# Patient Record
Sex: FEMALE | Race: BLACK OR AFRICAN AMERICAN | URBAN - METROPOLITAN AREA
[De-identification: names, ages, dates, MRNs, and addresses within clinical notes are randomized per-mention and may not be internally consistent; named-entity substitution may affect disease eponyms.]

---

## 2021-07-08 ENCOUNTER — APPOINTMENT (RX ONLY)
Dept: URBAN - METROPOLITAN AREA CLINIC 45 | Facility: CLINIC | Age: 21
Setting detail: DERMATOLOGY
End: 2021-07-08

## 2021-07-08 DIAGNOSIS — L70.8 OTHER ACNE: ICD-10-CM | Status: INADEQUATELY CONTROLLED

## 2021-07-08 PROCEDURE — ? ADDITIONAL NOTES

## 2021-07-08 PROCEDURE — ? COUNSELING

## 2021-07-08 PROCEDURE — ? PRESCRIPTION

## 2021-07-08 PROCEDURE — 99204 OFFICE O/P NEW MOD 45 MIN: CPT

## 2021-07-08 PROCEDURE — ? PRESCRIPTION MEDICATION MANAGEMENT

## 2021-07-08 RX ORDER — TRETIONIN 0.5 MG/G
CREAM TOPICAL QHS
Qty: 1 | Refills: 5 | Status: ERX | COMMUNITY
Start: 2021-07-08

## 2021-07-08 RX ORDER — AZELAIC ACID 0.15 G/G
GEL TOPICAL QAM
Qty: 1 | Refills: 3 | Status: ERX | COMMUNITY
Start: 2021-07-08

## 2021-07-08 RX ADMIN — AZELAIC ACID: 0.15 GEL TOPICAL at 00:00

## 2021-07-08 RX ADMIN — TRETIONIN: 0.5 CREAM TOPICAL at 00:00

## 2021-07-08 ASSESSMENT — LOCATION DETAILED DESCRIPTION DERM
LOCATION DETAILED: LEFT CHIN
LOCATION DETAILED: RIGHT INFERIOR MEDIAL FOREHEAD
LOCATION DETAILED: RIGHT CENTRAL MALAR CHEEK
LOCATION DETAILED: LEFT CENTRAL MALAR CHEEK

## 2021-07-08 ASSESSMENT — LOCATION SIMPLE DESCRIPTION DERM
LOCATION SIMPLE: RIGHT CHEEK
LOCATION SIMPLE: LEFT CHEEK
LOCATION SIMPLE: RIGHT FOREHEAD
LOCATION SIMPLE: CHIN

## 2021-07-08 ASSESSMENT — LOCATION ZONE DERM: LOCATION ZONE: FACE

## 2021-07-08 NOTE — PROCEDURE: PRESCRIPTION MEDICATION MANAGEMENT
Render In Strict Bullet Format?: No
Initiate Treatment: Patient to wash face every morning with Panoxyl 10% wash, then will apply Clindamycin 1% swabs to face every morning, then apply moisturizer with sunscreen, at night patient to wash face with Cetaphil/CeraVe gentle cleanser then rinse off, pat face dry, then apply a pea-size amount of the Tretinoin 0.05% cream to face let dry and apply a moisturizer.
Detail Level: Zone
Plan: Morning -\\nPanoxyl wash in morning for face\\nazelaic acid gel \\nSpf moisturizer\\n\\An night-\\nCetaphil gentle cleanser \\nTretinoin cream\\nCetaphil Moisturizer or hyaluronic serum \\n\\nIf things are better in 3 months when patient comes back to see us , we will stay on topicals\\nif not we will start Spironolactone

## 2021-07-08 NOTE — HPI: PIMPLES (ACNE)
What Type Of Note Output Would You Prefer (Optional)?: Bullet Format
How Severe Is Your Acne?: moderate
Is This A New Presentation, Or A Follow-Up?: Acne
Additional Comments (Use Complete Sentences): NEW PT.

## 2021-07-08 NOTE — PROCEDURE: COUNSELING
Tazorac Counseling:  Patient advised that medication is irritating and drying.  Patient may need to apply sparingly and wash off after an hour before eventually leaving it on overnight.  The patient verbalized understanding of the proper use and possible adverse effects of tazorac.  All of the patient's questions and concerns were addressed.
Azithromycin Pregnancy And Lactation Text: This medication is considered safe during pregnancy and is also secreted in breast milk.
Azithromycin Counseling:  I discussed with the patient the risks of azithromycin including but not limited to GI upset, allergic reaction, drug rash, diarrhea, and yeast infections.
Detail Level: Detailed
Isotretinoin Counseling: Patient should get monthly blood tests, not donate blood, not drive at night if vision affected, not share medication, and not undergo elective surgery for 6 months after tx completed. Side effects reviewed, pt to contact office should one occur.
Include Pregnancy/Lactation Warning?: No
Erythromycin Counseling:  I discussed with the patient the risks of erythromycin including but not limited to GI upset, allergic reaction, drug rash, diarrhea, increase in liver enzymes, and yeast infections.
Topical Sulfur Applications Pregnancy And Lactation Text: This medication is Pregnancy Category C and has an unknown safety profile during pregnancy. It is unknown if this topical medication is excreted in breast milk.
High Dose Vitamin A Pregnancy And Lactation Text: High dose vitamin A therapy is contraindicated during pregnancy and breast feeding.
High Dose Vitamin A Counseling: Side effects reviewed, pt to contact office should one occur.
Benzoyl Peroxide Pregnancy And Lactation Text: This medication is Pregnancy Category C. It is unknown if benzoyl peroxide is excreted in breast milk.
Topical Retinoid counseling:  Patient advised to apply a pea-sized amount only at bedtime and wait 30 minutes after washing their face before applying.  If too drying, patient may add a non-comedogenic moisturizer. The patient verbalized understanding of the proper use and possible adverse effects of retinoids.  All of the patient's questions and concerns were addressed.
Bactrim Pregnancy And Lactation Text: This medication is Pregnancy Category D and is known to cause fetal risk.  It is also excreted in breast milk.
Dapsone Counseling: I discussed with the patient the risks of dapsone including but not limited to hemolytic anemia, agranulocytosis, rashes, methemoglobinemia, kidney failure, peripheral neuropathy, headaches, GI upset, and liver toxicity.  Patients who start dapsone require monitoring including baseline LFTs and weekly CBCs for the first month, then every month thereafter.  The patient verbalized understanding of the proper use and possible adverse effects of dapsone.  All of the patient's questions and concerns were addressed.
Spironolactone Pregnancy And Lactation Text: This medication can cause feminization of the male fetus and should be avoided during pregnancy. The active metabolite is also found in breast milk.
Tazorac Pregnancy And Lactation Text: This medication is not safe during pregnancy. It is unknown if this medication is excreted in breast milk.
Benzoyl Peroxide Counseling: Patient counseled that medicine may cause skin irritation and bleach clothing.  In the event of skin irritation, the patient was advised to reduce the amount of the drug applied or use it less frequently.   The patient verbalized understanding of the proper use and possible adverse effects of benzoyl peroxide.  All of the patient's questions and concerns were addressed.
Doxycycline Pregnancy And Lactation Text: This medication is Pregnancy Category D and not consider safe during pregnancy. It is also excreted in breast milk but is considered safe for shorter treatment courses.
Dapsone Pregnancy And Lactation Text: This medication is Pregnancy Category C and is not considered safe during pregnancy or breast feeding.
Minocycline Pregnancy And Lactation Text: This medication is Pregnancy Category D and not consider safe during pregnancy. It is also excreted in breast milk.
Isotretinoin Pregnancy And Lactation Text: This medication is Pregnancy Category X and is considered extremely dangerous during pregnancy. It is unknown if it is excreted in breast milk.
Topical Sulfur Applications Counseling: Topical Sulfur Counseling: Patient counseled that this medication may cause skin irritation or allergic reactions.  In the event of skin irritation, the patient was advised to reduce the amount of the drug applied or use it less frequently.   The patient verbalized understanding of the proper use and possible adverse effects of topical sulfur application.  All of the patient's questions and concerns were addressed.
Erythromycin Pregnancy And Lactation Text: This medication is Pregnancy Category B and is considered safe during pregnancy. It is also excreted in breast milk.
Birth Control Pills Pregnancy And Lactation Text: This medication should be avoided if pregnant and for the first 30 days post-partum.
Tetracycline Counseling: Patient counseled regarding possible photosensitivity and increased risk for sunburn.  Patient instructed to avoid sunlight, if possible.  When exposed to sunlight, patients should wear protective clothing, sunglasses, and sunscreen.  The patient was instructed to call the office immediately if the following severe adverse effects occur:  hearing changes, easy bruising/bleeding, severe headache, or vision changes.  The patient verbalized understanding of the proper use and possible adverse effects of tetracycline.  All of the patient's questions and concerns were addressed. Patient understands to avoid pregnancy while on therapy due to potential birth defects.
Bactrim Counseling:  I discussed with the patient the risks of sulfa antibiotics including but not limited to GI upset, allergic reaction, drug rash, diarrhea, dizziness, photosensitivity, and yeast infections.  Rarely, more serious reactions can occur including but not limited to aplastic anemia, agranulocytosis, methemoglobinemia, blood dyscrasias, liver or kidney failure, lung infiltrates or desquamative/blistering drug rashes.
Topical Retinoid Pregnancy And Lactation Text: This medication is Pregnancy Category C. It is unknown if this medication is excreted in breast milk.
Topical Clindamycin Pregnancy And Lactation Text: This medication is Pregnancy Category B and is considered safe during pregnancy. It is unknown if it is excreted in breast milk.
Birth Control Pills Counseling: Birth Control Pill Counseling: I discussed with the patient the potential side effects of OCPs including but not limited to increased risk of stroke, heart attack, thrombophlebitis, deep venous thrombosis, hepatic adenomas, breast changes, GI upset, headaches, and depression.  The patient verbalized understanding of the proper use and possible adverse effects of OCPs. All of the patient's questions and concerns were addressed.
Doxycycline Counseling:  Patient counseled regarding possible photosensitivity and increased risk for sunburn.  Patient instructed to avoid sunlight, if possible.  When exposed to sunlight, patients should wear protective clothing, sunglasses, and sunscreen.  The patient was instructed to call the office immediately if the following severe adverse effects occur:  hearing changes, easy bruising/bleeding, severe headache, or vision changes.  The patient verbalized understanding of the proper use and possible adverse effects of doxycycline.  All of the patient's questions and concerns were addressed.
Spironolactone Counseling: Patient advised regarding risks of diarrhea, abdominal pain, hyperkalemia, birth defects (for female patients), liver toxicity and renal toxicity. The patient may need blood work to monitor liver and kidney function and potassium levels while on therapy. The patient verbalized understanding of the proper use and possible adverse effects of spironolactone.  All of the patient's questions and concerns were addressed.
Minocycline Counseling: Patient advised regarding possible photosensitivity and discoloration of the teeth, skin, lips, tongue and gums.  Patient instructed to avoid sunlight, if possible.  When exposed to sunlight, patients should wear protective clothing, sunglasses, and sunscreen.  The patient was instructed to call the office immediately if the following severe adverse effects occur:  hearing changes, easy bruising/bleeding, severe headache, or vision changes.  The patient verbalized understanding of the proper use and possible adverse effects of minocycline.  All of the patient's questions and concerns were addressed.
Sarecycline Counseling: Patient advised regarding possible photosensitivity and discoloration of the teeth, skin, lips, tongue and gums.  Patient instructed to avoid sunlight, if possible.  When exposed to sunlight, patients should wear protective clothing, sunglasses, and sunscreen.  The patient was instructed to call the office immediately if the following severe adverse effects occur:  hearing changes, easy bruising/bleeding, severe headache, or vision changes.  The patient verbalized understanding of the proper use and possible adverse effects of sarecycline.  All of the patient's questions and concerns were addressed.
Topical Clindamycin Counseling: Patient counseled that this medication may cause skin irritation or allergic reactions.  In the event of skin irritation, the patient was advised to reduce the amount of the drug applied or use it less frequently.   The patient verbalized understanding of the proper use and possible adverse effects of clindamycin.  All of the patient's questions and concerns were addressed.

## 2021-09-02 PROBLEM — S80.01XA CONTUSION OF RIGHT KNEE: Status: ACTIVE | Noted: 2018-07-26

## 2021-09-02 PROBLEM — R22.9 SKIN MASS: Status: ACTIVE | Noted: 2017-08-01

## 2021-09-02 PROBLEM — M25.462 PAIN AND SWELLING OF LEFT KNEE: Status: ACTIVE | Noted: 2019-12-17

## 2021-09-02 PROBLEM — M25.562 PAIN AND SWELLING OF LEFT KNEE: Status: ACTIVE | Noted: 2019-12-17

## 2021-12-08 PROBLEM — M23.92 INTERNAL DERANGEMENT OF LEFT KNEE: Status: ACTIVE | Noted: 2021-12-08

## 2021-12-08 PROBLEM — Z98.890 HISTORY OF REPAIR OF ACL: Status: ACTIVE | Noted: 2021-12-08

## 2021-12-17 ENCOUNTER — ANESTHESIA EVENT (OUTPATIENT)
Dept: SURGERY | Age: 21
End: 2021-12-17
Payer: COMMERCIAL

## 2021-12-20 ENCOUNTER — ANESTHESIA (OUTPATIENT)
Dept: SURGERY | Age: 21
End: 2021-12-20
Payer: COMMERCIAL

## 2021-12-20 ENCOUNTER — HOSPITAL ENCOUNTER (OUTPATIENT)
Age: 21
Setting detail: OUTPATIENT SURGERY
Discharge: HOME | End: 2021-12-20
Attending: ORTHOPAEDIC SURGERY | Admitting: ORTHOPAEDIC SURGERY
Payer: COMMERCIAL

## 2021-12-20 VITALS
WEIGHT: 155 LBS | RESPIRATION RATE: 14 BRPM | TEMPERATURE: 97.7 F | DIASTOLIC BLOOD PRESSURE: 76 MMHG | HEART RATE: 46 BPM | SYSTOLIC BLOOD PRESSURE: 114 MMHG | OXYGEN SATURATION: 100 % | HEIGHT: 70 IN | BODY MASS INDEX: 22.19 KG/M2

## 2021-12-20 DIAGNOSIS — Z09 SURGERY FOLLOW-UP: ICD-10-CM

## 2021-12-20 DIAGNOSIS — M23.92 INTERNAL DERANGEMENT OF LEFT KNEE: ICD-10-CM

## 2021-12-20 PROBLEM — M24.662 ARTHROFIBROSIS OF KNEE JOINT, LEFT: Status: ACTIVE | Noted: 2021-12-20

## 2021-12-20 LAB — HCG UR QL: NEGATIVE

## 2021-12-20 PROCEDURE — 77030040922 HC BLNKT HYPOTHRM STRY -A: Performed by: ANESTHESIOLOGY

## 2021-12-20 PROCEDURE — 77030019605: Performed by: ORTHOPAEDIC SURGERY

## 2021-12-20 PROCEDURE — 2709999900 HC NON-CHARGEABLE SUPPLY: Performed by: ORTHOPAEDIC SURGERY

## 2021-12-20 PROCEDURE — 74011250636 HC RX REV CODE- 250/636: Performed by: PHYSICIAN ASSISTANT

## 2021-12-20 PROCEDURE — 77030038066 HC BLD SHVR ARTH -B: Performed by: ORTHOPAEDIC SURGERY

## 2021-12-20 PROCEDURE — 74011250636 HC RX REV CODE- 250/636: Performed by: NURSE ANESTHETIST, CERTIFIED REGISTERED

## 2021-12-20 PROCEDURE — 76210000020 HC REC RM PH II FIRST 0.5 HR: Performed by: ORTHOPAEDIC SURGERY

## 2021-12-20 PROCEDURE — 77030002933 HC SUT MCRYL J&J -A: Performed by: ORTHOPAEDIC SURGERY

## 2021-12-20 PROCEDURE — 81025 URINE PREGNANCY TEST: CPT

## 2021-12-20 PROCEDURE — 76060000032 HC ANESTHESIA 0.5 TO 1 HR: Performed by: ORTHOPAEDIC SURGERY

## 2021-12-20 PROCEDURE — 77030012894: Performed by: ORTHOPAEDIC SURGERY

## 2021-12-20 PROCEDURE — 76210000006 HC OR PH I REC 0.5 TO 1 HR: Performed by: ORTHOPAEDIC SURGERY

## 2021-12-20 PROCEDURE — 77030010509 HC AIRWY LMA MSK TELE -A: Performed by: NURSE ANESTHETIST, CERTIFIED REGISTERED

## 2021-12-20 PROCEDURE — 74011250636 HC RX REV CODE- 250/636: Performed by: ANESTHESIOLOGY

## 2021-12-20 PROCEDURE — 77030040936 HC WND COBLATN S&N -C: Performed by: ORTHOPAEDIC SURGERY

## 2021-12-20 PROCEDURE — 29884 ARTHRS KNEE SURG LYSIS ADS: CPT | Performed by: ORTHOPAEDIC SURGERY

## 2021-12-20 PROCEDURE — 74011250636 HC RX REV CODE- 250/636: Performed by: ORTHOPAEDIC SURGERY

## 2021-12-20 PROCEDURE — 74011000250 HC RX REV CODE- 250: Performed by: NURSE ANESTHETIST, CERTIFIED REGISTERED

## 2021-12-20 PROCEDURE — 77030003666 HC NDL SPINAL BD -A: Performed by: ORTHOPAEDIC SURGERY

## 2021-12-20 PROCEDURE — 74011250637 HC RX REV CODE- 250/637: Performed by: ANESTHESIOLOGY

## 2021-12-20 PROCEDURE — 76010000138 HC OR TIME 0.5 TO 1 HR: Performed by: ORTHOPAEDIC SURGERY

## 2021-12-20 RX ORDER — SODIUM CHLORIDE 0.9 % (FLUSH) 0.9 %
5-40 SYRINGE (ML) INJECTION EVERY 8 HOURS
Status: DISCONTINUED | OUTPATIENT
Start: 2021-12-20 | End: 2021-12-20 | Stop reason: HOSPADM

## 2021-12-20 RX ORDER — LIDOCAINE HYDROCHLORIDE 10 MG/ML
0.1 INJECTION INFILTRATION; PERINEURAL AS NEEDED
Status: DISCONTINUED | OUTPATIENT
Start: 2021-12-20 | End: 2021-12-20 | Stop reason: HOSPADM

## 2021-12-20 RX ORDER — FAMOTIDINE 20 MG/1
20 TABLET, FILM COATED ORAL ONCE
Status: COMPLETED | OUTPATIENT
Start: 2021-12-20 | End: 2021-12-20

## 2021-12-20 RX ORDER — DEXAMETHASONE SODIUM PHOSPHATE 100 MG/10ML
INJECTION INTRAMUSCULAR; INTRAVENOUS AS NEEDED
Status: DISCONTINUED | OUTPATIENT
Start: 2021-12-20 | End: 2021-12-20 | Stop reason: HOSPADM

## 2021-12-20 RX ORDER — SODIUM CHLORIDE 0.9 % (FLUSH) 0.9 %
5-40 SYRINGE (ML) INJECTION AS NEEDED
Status: DISCONTINUED | OUTPATIENT
Start: 2021-12-20 | End: 2021-12-20 | Stop reason: HOSPADM

## 2021-12-20 RX ORDER — ONDANSETRON 2 MG/ML
INJECTION INTRAMUSCULAR; INTRAVENOUS AS NEEDED
Status: DISCONTINUED | OUTPATIENT
Start: 2021-12-20 | End: 2021-12-20 | Stop reason: HOSPADM

## 2021-12-20 RX ORDER — ROPIVACAINE HYDROCHLORIDE 5 MG/ML
INJECTION, SOLUTION EPIDURAL; INFILTRATION; PERINEURAL AS NEEDED
Status: DISCONTINUED | OUTPATIENT
Start: 2021-12-20 | End: 2021-12-20 | Stop reason: HOSPADM

## 2021-12-20 RX ORDER — SODIUM CHLORIDE, SODIUM LACTATE, POTASSIUM CHLORIDE, CALCIUM CHLORIDE 600; 310; 30; 20 MG/100ML; MG/100ML; MG/100ML; MG/100ML
150 INJECTION, SOLUTION INTRAVENOUS CONTINUOUS
Status: DISCONTINUED | OUTPATIENT
Start: 2021-12-20 | End: 2021-12-20 | Stop reason: HOSPADM

## 2021-12-20 RX ORDER — KETOROLAC TROMETHAMINE 30 MG/ML
INJECTION, SOLUTION INTRAMUSCULAR; INTRAVENOUS AS NEEDED
Status: DISCONTINUED | OUTPATIENT
Start: 2021-12-20 | End: 2021-12-20 | Stop reason: HOSPADM

## 2021-12-20 RX ORDER — FENTANYL CITRATE 50 UG/ML
100 INJECTION, SOLUTION INTRAMUSCULAR; INTRAVENOUS ONCE
Status: COMPLETED | OUTPATIENT
Start: 2021-12-20 | End: 2021-12-20

## 2021-12-20 RX ORDER — PROPOFOL 10 MG/ML
INJECTION, EMULSION INTRAVENOUS AS NEEDED
Status: DISCONTINUED | OUTPATIENT
Start: 2021-12-20 | End: 2021-12-20 | Stop reason: HOSPADM

## 2021-12-20 RX ORDER — MIDAZOLAM HYDROCHLORIDE 1 MG/ML
2 INJECTION, SOLUTION INTRAMUSCULAR; INTRAVENOUS
Status: DISCONTINUED | OUTPATIENT
Start: 2021-12-20 | End: 2021-12-20 | Stop reason: HOSPADM

## 2021-12-20 RX ORDER — HYDROMORPHONE HYDROCHLORIDE 2 MG/ML
0.5 INJECTION, SOLUTION INTRAMUSCULAR; INTRAVENOUS; SUBCUTANEOUS
Status: DISCONTINUED | OUTPATIENT
Start: 2021-12-20 | End: 2021-12-20 | Stop reason: HOSPADM

## 2021-12-20 RX ORDER — ACETAMINOPHEN 500 MG
1000 TABLET ORAL ONCE
Status: COMPLETED | OUTPATIENT
Start: 2021-12-20 | End: 2021-12-20

## 2021-12-20 RX ORDER — LIDOCAINE HYDROCHLORIDE 20 MG/ML
INJECTION, SOLUTION EPIDURAL; INFILTRATION; INTRACAUDAL; PERINEURAL AS NEEDED
Status: DISCONTINUED | OUTPATIENT
Start: 2021-12-20 | End: 2021-12-20 | Stop reason: HOSPADM

## 2021-12-20 RX ORDER — HYDROCODONE BITARTRATE AND ACETAMINOPHEN 5; 325 MG/1; MG/1
1 TABLET ORAL AS NEEDED
Status: DISCONTINUED | OUTPATIENT
Start: 2021-12-20 | End: 2021-12-20 | Stop reason: HOSPADM

## 2021-12-20 RX ORDER — ACETAMINOPHEN 500 MG
1000 TABLET ORAL
Status: DISCONTINUED | OUTPATIENT
Start: 2021-12-20 | End: 2021-12-20 | Stop reason: HOSPADM

## 2021-12-20 RX ORDER — SODIUM CHLORIDE 9 MG/ML
50 INJECTION, SOLUTION INTRAVENOUS CONTINUOUS
Status: DISCONTINUED | OUTPATIENT
Start: 2021-12-20 | End: 2021-12-20 | Stop reason: HOSPADM

## 2021-12-20 RX ORDER — CEFAZOLIN SODIUM/WATER 2 G/20 ML
2 SYRINGE (ML) INTRAVENOUS ONCE
Status: COMPLETED | OUTPATIENT
Start: 2021-12-20 | End: 2021-12-20

## 2021-12-20 RX ADMIN — FENTANYL CITRATE 25 MCG: 50 INJECTION INTRAMUSCULAR; INTRAVENOUS at 08:20

## 2021-12-20 RX ADMIN — FENTANYL CITRATE 25 MCG: 50 INJECTION INTRAMUSCULAR; INTRAVENOUS at 08:21

## 2021-12-20 RX ADMIN — LIDOCAINE HYDROCHLORIDE 100 MG: 20 INJECTION, SOLUTION EPIDURAL; INFILTRATION; INTRACAUDAL; PERINEURAL at 08:14

## 2021-12-20 RX ADMIN — PROPOFOL 200 MG: 10 INJECTION, EMULSION INTRAVENOUS at 08:14

## 2021-12-20 RX ADMIN — Medication 2 G: at 08:20

## 2021-12-20 RX ADMIN — ONDANSETRON 4 MG: 2 INJECTION INTRAMUSCULAR; INTRAVENOUS at 08:22

## 2021-12-20 RX ADMIN — SODIUM CHLORIDE, SODIUM LACTATE, POTASSIUM CHLORIDE, AND CALCIUM CHLORIDE 150 ML/HR: 600; 310; 30; 20 INJECTION, SOLUTION INTRAVENOUS at 06:42

## 2021-12-20 RX ADMIN — KETOROLAC TROMETHAMINE 30 MG: 30 INJECTION, SOLUTION INTRAMUSCULAR at 09:01

## 2021-12-20 RX ADMIN — ACETAMINOPHEN 1000 MG: 500 TABLET ORAL at 06:36

## 2021-12-20 RX ADMIN — FENTANYL CITRATE 25 MCG: 50 INJECTION INTRAMUSCULAR; INTRAVENOUS at 08:23

## 2021-12-20 RX ADMIN — FENTANYL CITRATE 25 MCG: 50 INJECTION INTRAMUSCULAR; INTRAVENOUS at 08:18

## 2021-12-20 RX ADMIN — FAMOTIDINE 20 MG: 20 TABLET ORAL at 06:36

## 2021-12-20 RX ADMIN — DEXAMETHASONE SODIUM PHOSPHATE 10 MG: 10 INJECTION INTRAMUSCULAR; INTRAVENOUS at 08:22

## 2021-12-20 NOTE — DISCHARGE INSTRUCTIONS
Postoperative Instructions - Knee Arthroscopy    Please note these are general instructions for postoperative care. Specific instructions may be given regarding the type of surgery that you have undergone. 1. Postoperative dressings may be removed after 2 to 3 days. Dressings should be kept dry for the first several days. Following removal of the dressing, wounds should be kept dry when showering. A large trash bag taped to the thigh can work well. Do not soak wounds in the tub prior to suture removal.      2. All steri-strip tapes across the wound should be left in place if your incisions have them. 3. Some discomfort is expected following any operation. You will be given a prescription for pain medication along with instructions for its use. Many pain medications contain Tylenol. Do not take additional Tylenol if you are currently taking the prescribed pain medications. Do not drive while taking pain medications. Do not make important personal or business decisions or sign legal documents the first 24 hours after surgery. If your pain is not adequately controlled, contact your surgeon on call at the numbers on this sheet. 4. Following simple knee arthroscopy crutches are not usually utilized or are used for only 1 or 2 days. Working on regaining full motion of the knee is encouraged. Bending and straightening the knee and performing ankle range of motion is encouraged to prevent blood clots. Try to use a stationary bike without resistance within the first week of surgery to help regain motion. 5. Elevating the lower extremity after surgery is helpful in the first few days postoperatively. This can help control swelling. Applying ice for 15 to 20 minutes per hour to the surgical site is often helpful in decreasing pain and swelling.     6. As pain subsides, discontinuation of narcotic medication is recommended and switching to Tylenol or over the counter anti-inflammatory medication is [Takes medication as prescribed] : takes desirable. 7. Pain medications can cause constipation, nausea and vomiting, and sometimes itching and hives. Keeping hydrated by taking liquids on a regular basis can help. For constipation, consider over the counter additions like Metamucil or an over-the-counter stool softner. Trying to limit narcotic use can often help with regards to nausea and vomiting. Taking pain medication with a small amount of food is also usually helpful. Itching and hives can occur with pain medication as well as antibiotics that are given during the priyanka-operative time. Over the counter Benadryl (25mg every 6 hours) can be helpful in treating itching. 8. If you feel you are progressing slowly, feel free to call our office to get you into physical therapy if you do not already have a prescription. 9. Any fevers (101º or greater) after 2-3 days post-op, increasing redness, drainage, or steadily increasing pain please notify us or come in as soon as possible. 10. Deep vein thrombosis (DVT) is a condition in which a blood clot has formed in a deep vein of the leg. This may result in redness, swelling, warmth and/or pain of the leg. Although these are very rare, there are things that can be done to lessen the risk. - It is recommended by your surgeon unless indicated otherwise, after knee arthroscopy; take an adult aspirin once a day for three weeks after surgery to help prevent the formation of blood clots. (Do not take aspirin against the advice of your medical doctor). -   For several days (at least until you are walking about most of the day), rotate your  ankle, bend your toes and move your foot back and forth and from side to side, frequently (at least a few minutes every hour while you are awake) in order to keep blood circulation flowing so that the blood clotting will be less likely to occur. The more circulation, the less chance of blood clotting and a DVT.     If you call the office [None] : Patient does not have any barriers to medication adherence please have us paged instead of leaving a voice mail so that we can immediately take care of your needs. Phone [phone]              Fax [phone]                         MEDICATION INTERACTION:  During your procedure you potentially received a medication or medications which may reduce the effectiveness of oral contraceptives. Please consider other forms of contraception for 1 month following your procedure if you are currently using oral contraceptives as your primary form of birth control. In addition to this, we recommend continuing your oral contraceptive as prescribed, unless otherwise instructed by your physician, during this time    After general anesthesia or intravenous sedation, for 24 hours or while taking prescription Narcotics:  · Limit your activities  · A responsible adult needs to be with you for the next 24 hours  · Do not drive and operate hazardous machinery  · Do not make important personal or business decisions  · Do not drink alcoholic beverages  · If you have not urinated within 8 hours after discharge, and you are experiencing discomfort from urinary retention, please go to the nearest ED. · If you have sleep apnea and have a CPAP machine, please use it for all naps and sleeping. · Please use caution when taking narcotics and any of your home medications that may cause drowsiness. *  Please give a list of your current medications to your Primary Care Provider. *  Please update this list whenever your medications are discontinued, doses are      changed, or new medications (including over-the-counter products) are added. *  Please carry medication information at all times in case of emergency situations. These are general instructions for a healthy lifestyle:  No smoking/ No tobacco products/ Avoid exposure to second hand smoke  Surgeon General's Warning:  Quitting smoking now greatly reduces serious risk to your health.   Obesity, smoking, and sedentary lifestyle greatly increases your risk for illness  A healthy diet, regular physical exercise & weight monitoring are important for maintaining a healthy lifestyle    You may be retaining fluid if you have a history of heart failure or if you experience any of the following symptoms:  Weight gain of 3 pounds or more overnight or 5 pounds in a week, increased swelling in our hands or feet or shortness of breath while lying flat in bed. Please call your doctor as soon as you notice any of these symptoms; do not wait until your next office visit.

## 2021-12-20 NOTE — PERIOP NOTES
Discharge education and instructions reviewed with pt and pt's mom at bedside. No questions or concerns at this time. Prescriptions escribed.

## 2021-12-20 NOTE — ANESTHESIA POSTPROCEDURE EVALUATION
Procedure(s):  LEFT KNEE DIAGNOSTIC ARTHROSCOPY/LYSIS OF ADHESIONS AND DEBRIDEMENT/CHONDROPLASTY. general    Anesthesia Post Evaluation      Multimodal analgesia: multimodal analgesia used between 6 hours prior to anesthesia start to PACU discharge  Patient location during evaluation: bedside  Patient participation: complete - patient participated  Level of consciousness: awake and alert  Pain management: adequate  Airway patency: patent  Anesthetic complications: no  Cardiovascular status: hemodynamically stable  Respiratory status: spontaneous ventilation  Hydration status: euvolemic  Comments: Patient stable and may discharge at this time. Post anesthesia nausea and vomiting:  none  Final Post Anesthesia Temperature Assessment:  Normothermia (36.0-37.5 degrees C)      INITIAL Post-op Vital signs:   Vitals Value Taken Time   /76 12/20/21 0932   Temp 36.5 °C (97.7 °F) 12/20/21 0932   Pulse 52 12/20/21 0933   Resp 14 12/20/21 0932   SpO2 100 % 12/20/21 0933   Vitals shown include unvalidated device data.

## 2021-12-20 NOTE — H&P
Outpatient Surgery History and Physical:  Breezy Oviedo was seen and examined. CHIEF COMPLAINT:   Left knee pain. PE:     Visit Vitals  /67 (BP 1 Location: Right arm, BP Patient Position: Sitting)   Pulse (!) 58   Temp 98 °F (36.7 °C)   Resp 18   Ht 5' 10\" (1.778 m)   Wt 155 lb (70.3 kg)   LMP 11/20/2021   SpO2 100%   BMI 22.24 kg/m²       Heart:   Regular rhythm, regular pulses. Lungs:  Are clear, non-labored respirations. Past Medical History:    Patient Active Problem List    Diagnosis    History of repair of ACL    Internal derangement of left knee    Pain and swelling of left knee    Contusion of right knee    Skin mass     Formatting of this note might be different from the original.  Genital - outer labia 1cm palpable firm moveable nontender mass - referred to derm      Closed fracture of radius       Surgical History:   Past Surgical History:   Procedure Laterality Date    HX KNEE ARTHROSCOPY Left     HX TONSILLECTOMY         Social History: Patient  reports that she has never smoked. She has never used smokeless tobacco. She reports previous alcohol use. She reports that she does not use drugs. Family History: History reviewed. No pertinent family history. Allergies: Reviewed per EMR  No Known Allergies    Medications:    No current facility-administered medications on file prior to encounter. No current outpatient medications on file prior to encounter. The surgery is planned for the left knee. Left knee diagnostic arthroscopy with lysis of adhesions, possible meniscus repair vs. Partial meniscectomy. History and physical has been reviewed. The patient has been examined. There have been no significant clinical changes since the completion of the originally dated History and Physical.  Patient identified by surgeon; surgical site was confirmed by patient and surgeon. The patient is here today for outpatient surgery.  I have examined the patient, no changes are noted in the patient's medical status. Necessity for the procedure/care is still present and the history and physical above is current. See the office notes for the full long term history of the problem. Please see the recent office notes for the musculoskeletal examination. We have already discussed the clinical implications of both conservative and operative treatments. They would like to proceed with operative treatment. I talked with them extensively about the risks, benefits, reasonable expectations and expected recovery time including long term need for ambulatory assistance as well as possible complications including but not limited to bleeding, infection, need for hardware removal or exchange, neurovascular injury, stiffness, pain, dislocation, continued problems, DVT, PE, hardware failure, other fracture, need for further surgery, heterotopic ossification, MI and other anesthesia related risks, etc. They have exhausted all other options and wish to proceed. The patient was counseled at length about the risks of bertram Covid-19 during their perioperative period and any recovery window from their procedure. The patient was made aware that bertram Covid-19  may worsen their prognosis for recovering from their procedure and lend to a higher morbidity and/or mortality risk. All material risks, benefits, and reasonable alternatives including postponing the procedure were discussed. The patient does  wish to proceed with the procedure at this time.       Signed By: Rodney Teran MD     December 20, 2021 7:02 AM

## 2021-12-20 NOTE — ANESTHESIA PREPROCEDURE EVALUATION
Relevant Problems   No relevant active problems       Anesthetic History   No history of anesthetic complications            Review of Systems / Medical History  Patient summary reviewed and pertinent labs reviewed    Pulmonary  Within defined limits                 Neuro/Psych   Within defined limits           Cardiovascular  Within defined limits                Exercise tolerance: >4 METS     GI/Hepatic/Renal  Within defined limits              Endo/Other  Within defined limits           Other Findings              Physical Exam    Airway  Mallampati: II  TM Distance: 4 - 6 cm  Neck ROM: normal range of motion   Mouth opening: Normal     Cardiovascular  Regular rate and rhythm,  S1 and S2 normal,  no murmur, click, rub, or gallop  Rhythm: regular  Rate: normal         Dental  No notable dental hx       Pulmonary  Breath sounds clear to auscultation               Abdominal         Other Findings            Anesthetic Plan    ASA: 1  Anesthesia type: general          Induction: Intravenous  Anesthetic plan and risks discussed with: Patient

## 2021-12-20 NOTE — OP NOTES
Operative Note    12/20/2021     Preoperative diagnosis:  Internal derangement of left knee [M23.92]    Postoperative diagnosis: Internal derangement of left knee [M23.92]    Surgeon(s) and Role:     Wing Hawley MD - Primary     Assistant: none      Anesthesia: General    Tourniquet Time:   Total Tourniquet Time Documented:  Thigh (Left) - 27 minutes  Total: Thigh (Left) - 27 minutes    Antibiotics: Ancef 2 gram IV    Procedures:  Procedure(s):  LEFT KNEE DIAGNOSTIC ARTHROSCOPY/LYSIS OF ADHESIONS AND DEBRIDEMENT/CHONDROPLASTY   39423    Findings:  1. EUA -   Lachman's felt fairly stable with an endpoint that was grade 1+A in comparison to a very stable right knee. She did have a grade 1 pivot shift as well. Stable to varus and valgus. 2. PFJ - Normal with no evidence of chondromalacia or abnormality. 3. Medial Joint -there was no medial meniscus tear, cartilage appeared fairly normal.  There is a little bit of redundant tissue that was spilling into the lateralmost aspect of the medial compartment from the anterior aspect of the ACL near the tibial tunnel site. Small plica seen in the medial gutter. 4. Lateral joint -the lateral joint carilage appeared fairly normal with some grade I chondromalacia change over the lateral tibial plateau. The femoral side appeared fairly normal except for up near the sulcus terminalis anteriorly. Small area grade I chondromalacia. The lateral meniscus was stable to probing and showed no tears present. Mild fraying of the posterior horn of the meniscus which appeared to be just degenerative change. 5. PCL - stable and intact  6. ACL -the ACL was somewhat fibrotic anteriorly with some scar tissue noted indicative of small area of cyclops-like lesion. Overall the graft was intact but there was a fair amount of redundancy anteriorly. With the knee in full extension it was noted that a small cyclops lesion would impinge on the femur and tibia.        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 Possible continued left knee instability. After previous discussions and treatments using both conservative and/or non-operative treatment options the patient elected to proceed with surgery due to continued symptoms. A review of the risks and benefits, including but not limited to infection, stiffness, injury to nerves and vessels, DVT, PE, MI, need for further operations and other anesthesia related risks was performed with the patient. After this review and the review of the likely outcome and potential complications of the procedure, preoperative verbal and written consents were obtained. The operative procedure and postoperative course were discussed with the patient in detail and the extremity was marked by the patient and myself. Procedure:     the patient was given their anesthetic, placed in a supine position, an EUA was performed and noted above. It was felt that there was a little bit of instability but on the Lachman's there is only a 5 mm or less translation with a solid endpoint. A lateral post was placed adjacent to the operative leg. The contralateral leg was padded appropriately and lay on the operating table. The surgical leg was prepped with ChloraPrep and draped in the standard fashion. Prior to the beginning of the procedure, a time-out was performed for correct surgical site identification as was marked during the pre-operative meeting. This was confirmed using the written consent and history/physical. Time-out for antibiotic dosing, timing and selection was also performed. An esmarch was used to exsanguinate the leg and the tourniquet was inflated to 250 mmhg. An incision was made and the scope was introduced anterolaterally and an anteromedial portal was developed with the use of spinal needle localization.   The patellofemoral joint was viewed and the articular surfaces were normal.  The medial and lateral gutters were reviewed and they were normal except for maybe a small area of synovitis in the lateral compartment. The medial compartment was viewed and the articular surfaces were noted. The medial meniscus was probed and it was noted to be stable. There was some small fibrotic tissue along the anterior aspect medial compartment that was over spill from the ACL reconstruction that was debrided with a shaver. The notch was viewed and the ACL and PCL were evaluated. The PCL appeared intact. The ACL graft for the most part was intact with some anterior fibrotic bands that did not appear to be structurally performing any function. When the knee was extended it was noted that this area of arthrofibrotic tissue was impinging on the femur and tibia. This was debrided back carefully with the biter, werewolf cautery and shaver until there was no more impingement anteriorly. The probe was used to open up the lateral space in the notch. It was noted that in correlation with the MRI of the femoral tunnel was slightly anterior to where I would normally put it. .  The lateral compartment was viewed and probed and the articular surface and lateral meniscus were noted. There was mild grade 1 change in the central posterior aspect of the lateral tibia. The femur appeared fairly normal except for up near the anterior aspect where the sulcus terminalis was there was some grade 1 change. The werewolf device was used to perform stabilization of this somewhat loose frayed cartilage. The posteromedial compartment was viewed and no further abnormalities were noted. The scope was then placed superiorly. Any other loose bits of debris were removed from the joint. Local anesthetic was injected, 10 ml of Naropin, at the portal sites and intra-articularly.   Steri strips and mastasol were used to close the portals and a sterile Allevyn dressing and an Ace wrap were placed on the knee. The tourniquet was deflated. The patient was returned to the recovery room in a satisfactory condition. Post-operative plan: Patient will work on ROM and may progress weightbearing as they feel comfortable. They will start PT for 2 or more visits depending on their progress. Otherwise the patient may refer to post op instructions for wound care and progression of activities. Enteric Aspirin was discussed for DVT prophylaxis. Will follow up in 1 week for wound check and post op review. Pain medications have been provided. Estimated Blood Loss:  Minimal    Fluids:    See anesthesia record.     Implant: * No implants in log *    Closure: Primary    Complications: None    Signed By: Shlomo Norwood MD

## 2021-12-20 NOTE — BRIEF OP NOTE
Brief Postoperative Note    Patient: Tyson Skiff  YOB: 2000  MRN: [id number]    Date of Procedure: 12/20/2021     Pre-Op Diagnosis: Internal derangement of left knee [M23.92]    Post-Op Diagnosis: Same as preoperative diagnosis.       Procedure(s):  LEFT KNEE DIAGNOSTIC ARTHROSCOPY/LYSIS OF ADHESIONS AND DEBRIDEMENT/CHONDROPLASTY    Surgeon(s):  Mauricio Sher MD    Surgical Assistant: Surg Asst-1: Neel Fisher    Anesthesia: General     Estimated Blood Loss (mL): Minimal    Complications: None    Specimens: * No specimens in log *     Implants: * No implants in log *    Drains: * No LDAs found *    Findings: See operative note    Electronically Signed by Susy Ortiz MD on 12/20/2021 at 8:50 AM

## 2022-01-04 ENCOUNTER — APPOINTMENT (RX ONLY)
Dept: URBAN - METROPOLITAN AREA CLINIC 45 | Facility: CLINIC | Age: 22
Setting detail: DERMATOLOGY
End: 2022-01-04

## 2022-01-04 DIAGNOSIS — L70.8 OTHER ACNE: ICD-10-CM | Status: STABLE

## 2022-01-04 PROCEDURE — 99213 OFFICE O/P EST LOW 20 MIN: CPT

## 2022-01-04 PROCEDURE — ? ADDITIONAL NOTES

## 2022-01-04 PROCEDURE — ? PRESCRIPTION MEDICATION MANAGEMENT

## 2022-01-04 PROCEDURE — ? COUNSELING

## 2022-01-04 PROCEDURE — ? PRESCRIPTION

## 2022-01-04 RX ORDER — AZELAIC ACID 0.15 G/G
GEL TOPICAL QAM
Qty: 50 | Refills: 3 | Status: ERX | COMMUNITY
Start: 2022-01-04

## 2022-01-04 RX ORDER — TRETIONIN 0.5 MG/G
CREAM TOPICAL QHS
Qty: 45 | Refills: 2 | Status: ERX | COMMUNITY
Start: 2022-01-04

## 2022-01-04 RX ADMIN — TRETIONIN: 0.5 CREAM TOPICAL at 00:00

## 2022-01-04 RX ADMIN — AZELAIC ACID: 0.15 GEL TOPICAL at 00:00

## 2022-01-04 ASSESSMENT — LOCATION SIMPLE DESCRIPTION DERM
LOCATION SIMPLE: RIGHT FOREHEAD
LOCATION SIMPLE: RIGHT CHEEK
LOCATION SIMPLE: CHIN
LOCATION SIMPLE: LEFT CHEEK

## 2022-01-04 ASSESSMENT — LOCATION ZONE DERM: LOCATION ZONE: FACE

## 2022-01-04 ASSESSMENT — LOCATION DETAILED DESCRIPTION DERM
LOCATION DETAILED: LEFT CHIN
LOCATION DETAILED: RIGHT CENTRAL MALAR CHEEK
LOCATION DETAILED: LEFT CENTRAL MALAR CHEEK
LOCATION DETAILED: RIGHT INFERIOR MEDIAL FOREHEAD

## 2022-01-04 NOTE — PROCEDURE: PRESCRIPTION MEDICATION MANAGEMENT
Render In Strict Bullet Format?: No
Detail Level: Zone
Plan: Morning -\\nPanoxyl wash in morning for face\\nazelaic acid gel \\nSpf moisturizer\\n\\An night-\\nCetaphil gentle cleanser \\nTretinoin cream\\nCetaphil Moisturizer or hyaluronic serum
Continue Regimen: Patient to wash face every morning with Panoxyl 10% wash, then will apply Clindamycin 1% swabs to face every morning, then apply moisturizer with sunscreen, at night patient to wash face with Cetaphil/CeraVe gentle cleanser then rinse off, pat face dry, then apply a pea-size amount of the Tretinoin 0.05% cream to face let dry and apply a moisturizer.

## 2022-01-06 PROBLEM — M53.3 PAIN, COCCYX: Status: ACTIVE | Noted: 2021-10-19

## 2022-01-06 PROBLEM — Z09 SURGERY FOLLOW-UP EXAMINATION: Status: ACTIVE | Noted: 2022-01-06

## 2022-01-06 PROBLEM — S83.282A ACUTE LATERAL MENISCUS TEAR OF LEFT KNEE: Status: ACTIVE | Noted: 2022-01-06

## 2022-03-18 PROBLEM — M25.462 PAIN AND SWELLING OF LEFT KNEE: Status: ACTIVE | Noted: 2019-12-17

## 2022-03-18 PROBLEM — M25.562 PAIN AND SWELLING OF LEFT KNEE: Status: ACTIVE | Noted: 2019-12-17

## 2022-03-19 PROBLEM — Z98.890 HISTORY OF REPAIR OF ACL: Status: ACTIVE | Noted: 2021-12-08

## 2022-03-19 PROBLEM — R22.9 SKIN MASS: Status: ACTIVE | Noted: 2017-08-01

## 2022-03-19 PROBLEM — M23.92 INTERNAL DERANGEMENT OF LEFT KNEE: Status: ACTIVE | Noted: 2021-12-08

## 2022-03-19 PROBLEM — M24.662 ARTHROFIBROSIS OF KNEE JOINT, LEFT: Status: ACTIVE | Noted: 2021-12-20

## 2022-03-19 PROBLEM — S80.01XA CONTUSION OF RIGHT KNEE: Status: ACTIVE | Noted: 2018-07-26

## 2022-03-19 PROBLEM — S83.282A ACUTE LATERAL MENISCUS TEAR OF LEFT KNEE: Status: ACTIVE | Noted: 2022-01-06

## 2022-03-19 PROBLEM — Z09 SURGERY FOLLOW-UP EXAMINATION: Status: ACTIVE | Noted: 2022-01-06

## 2022-03-20 PROBLEM — M53.3 PAIN, COCCYX: Status: ACTIVE | Noted: 2021-10-19

## 2022-10-26 ENCOUNTER — OFFICE VISIT (OUTPATIENT)
Dept: ORTHOPEDIC SURGERY | Age: 22
End: 2022-10-26
Payer: COMMERCIAL

## 2022-10-26 DIAGNOSIS — S86.012A RUPTURE OF LEFT ACHILLES TENDON, INITIAL ENCOUNTER: Primary | ICD-10-CM

## 2022-10-26 PROCEDURE — 99214 OFFICE O/P EST MOD 30 MIN: CPT | Performed by: ORTHOPAEDIC SURGERY

## 2022-10-26 PROCEDURE — L4360 PNEUMAT WALKING BOOT PRE CST: HCPCS | Performed by: ORTHOPAEDIC SURGERY

## 2022-10-26 RX ORDER — AZELAIC ACID 0.15 G/G
GEL TOPICAL
COMMUNITY
Start: 2022-09-16

## 2022-10-26 RX ORDER — TRAMADOL HYDROCHLORIDE 50 MG/1
50 TABLET ORAL EVERY 4 HOURS PRN
Qty: 30 TABLET | Refills: 0 | Status: SHIPPED | OUTPATIENT
Start: 2022-10-26 | End: 2022-10-31

## 2022-10-26 NOTE — PROGRESS NOTES
Name: Marylu Vega  YOB: 2000  Gender: female  MRN: [id number]    Summary:   Left complete Achilles tendon tear       CC: New Patient Theo Anthony Student* DOI 10-25-22  achilles)       HPI: Marylu Vega is a 22 y.o. female who presents with New Patient Theo Anthony Student* DOI 10-25-22  achilles)  . This patient presents to the office today with her . 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 She went up for a jump and came down and felt a pop in the back of her calf. She is been able to weight-bear much or pushoff on the leg since that time. History was obtained by Patient ] and her     ROS/Meds/PSH/PMH/FH/SH: I personally reviewed the patients standard intake form. Below are the pertinents    Tobacco:  reports that she has never smoked. She has never used smokeless tobacco.  Diabetes: None      Physical Examination:    Exam of the left ankle is performed today. There is a complete gap in the Achilles tendon with a negative Martinez squeeze test on the side. She has palpable pulses and intact sensation. Imaging:   No imaging reviewed           Evelyn Frank III, MD           Assessment:   Left Achilles tendon rupture    Treatment Plan:   4 This is a undiagnosed new problem with uncertain prognosis  Treatment at this time: Bracing: Placed in: 3D boot and Prescription Drug Management  Studies ordered: NO XR needed @ Next Visit    Weight-bearing status: WBAT        Return to work/work restrictions: none  Tramadol 50mg p.o. q6h PRN pain Disp #30. This patient does not have a sulfa allergy. However,  I discussed the side effects of nausea and vomiting and GI distress. I also discussed how this is a narcotic and its abuse potential.  I told the patient I am not a long term pain medication provider so this Rx is for a short time only. I explained the need for accountability and honestly with narcotics.   I explained how overdosing can result in respiratory suppression and potentially death. She is been placed into a walker boot today. I Ernst Omar talk to her parents later. I tried to answer all the questions about operative versus nonoperative treatment and the percentages of success with him today. They will call us back if he like to proceed with surgical fixation. Left Achilles primary reconstruction  Outpatient-45 minutes  Anesthesia-choice, prone position    Risks and benefits of the procedure of been outlined to the patient especially rerupture of the Achilles, infection of the skin, tendon and bone, and the possibility of chronic pain. Casting potential 1 year recovery outlined. The patient accepts and would like to proceed with surgery. We have discussed that operative versus nonoperative treatment of Achilles tendon ruptures can both yield satisfactory results. We also discussed increased risk of rerupture with nonoperative treatment of Achilles tears and the typical return to sports in most patients is 6 months after surgery. Patient understands the pros and cons of each choice and wishes to proceed with surgery.

## 2022-10-28 NOTE — PERIOP NOTE
Patient verified name and . Order for consent not found in EHR ; patient verifies procedure. Type 1B surgery, phone assessment complete. Orders not received. Labs per surgeon: none  Labs per anesthesia protocol: none    Patient answered medical/surgical history questions at their best of ability. All prior to admission medications documented in [facility]. Patient instructed to take the following medications the day of surgery according to anesthesia guidelines with a small sip of water: Tramadol (Ultram) if needed On the day before surgery please take Acetaminophen 1000mg in the morning and then again before bed. You may substitute for Tylenol 650 mg. Hold all vitamins 7 days prior to surgery and NSAIDS 5 days prior to surgery. Prescription meds to hold:none  Patient instructed on the following:    > Arrive at [facility], time of arrival to be called the day before by 1700  > NPO after midnight, unless otherwise indicated, including gum, mints, and ice chips  > Responsible adult must drive patient to the hospital, stay during surgery, and patient will need supervision 24 hours after anesthesia  > Use Antibacterial soap in shower the night before surgery and on the morning of surgery  > All piercings must be removed prior to arrival.    > Leave all valuables (money and jewelry) at home but bring insurance card and ID on DOS.   > You may be required to pay a deductible or co-pay on the day of your procedure. You can pre-pay by calling [phone] if your surgery is at the [facility] or [phone] if your surgery is at the [facility]. > Do not wear make-up, nail polish, lotions, cologne, perfumes, powders, or oil on skin. Artificial nails are not permitted.

## 2022-10-31 DIAGNOSIS — G89.18 ACUTE POST-OPERATIVE PAIN: Primary | ICD-10-CM

## 2022-10-31 RX ORDER — PROCHLORPERAZINE EDISYLATE 5 MG/ML
5 INJECTION INTRAMUSCULAR; INTRAVENOUS
Status: CANCELLED | OUTPATIENT
Start: 2022-10-31 | End: 2022-11-01

## 2022-10-31 RX ORDER — OXYCODONE HYDROCHLORIDE 5 MG/1
5 TABLET ORAL EVERY 6 HOURS PRN
Qty: 20 TABLET | Refills: 0 | Status: SHIPPED | OUTPATIENT
Start: 2022-10-31 | End: 2022-11-05

## 2022-10-31 RX ORDER — OXYCODONE HYDROCHLORIDE 5 MG/1
5 TABLET ORAL
Status: CANCELLED | OUTPATIENT
Start: 2022-10-31 | End: 2022-11-01

## 2022-10-31 RX ORDER — HYDROMORPHONE HYDROCHLORIDE 2 MG/ML
0.5 INJECTION, SOLUTION INTRAMUSCULAR; INTRAVENOUS; SUBCUTANEOUS EVERY 5 MIN PRN
Status: CANCELLED | OUTPATIENT
Start: 2022-10-31

## 2022-10-31 RX ORDER — MEPERIDINE HYDROCHLORIDE 25 MG/ML
12.5 INJECTION INTRAMUSCULAR; INTRAVENOUS; SUBCUTANEOUS EVERY 5 MIN PRN
Status: CANCELLED | OUTPATIENT
Start: 2022-10-31

## 2022-10-31 RX ORDER — ASPIRIN 81 MG/1
81 TABLET ORAL 2 TIMES DAILY
Qty: 42 TABLET | Refills: 0 | Status: SHIPPED | OUTPATIENT
Start: 2022-10-31

## 2022-10-31 RX ORDER — ONDANSETRON 2 MG/ML
4 INJECTION INTRAMUSCULAR; INTRAVENOUS
Status: CANCELLED | OUTPATIENT
Start: 2022-10-31 | End: 2022-11-01

## 2022-10-31 RX ORDER — CEPHALEXIN 500 MG/1
500 CAPSULE ORAL 4 TIMES DAILY
Qty: 12 CAPSULE | Refills: 0 | Status: SHIPPED | OUTPATIENT
Start: 2022-10-31

## 2022-11-01 ENCOUNTER — ANESTHESIA EVENT (OUTPATIENT)
Dept: SURGERY | Age: 22
End: 2022-11-01
Payer: COMMERCIAL

## 2022-11-01 ENCOUNTER — HOSPITAL ENCOUNTER (OUTPATIENT)
Age: 22
Setting detail: OUTPATIENT SURGERY
Discharge: HOME | End: 2022-11-01
Attending: ORTHOPAEDIC SURGERY | Admitting: ORTHOPAEDIC SURGERY
Payer: COMMERCIAL

## 2022-11-01 ENCOUNTER — ANESTHESIA (OUTPATIENT)
Dept: SURGERY | Age: 22
End: 2022-11-01
Payer: COMMERCIAL

## 2022-11-01 VITALS
DIASTOLIC BLOOD PRESSURE: 66 MMHG | HEART RATE: 72 BPM | SYSTOLIC BLOOD PRESSURE: 114 MMHG | RESPIRATION RATE: 16 BRPM | TEMPERATURE: 98.2 F | BODY MASS INDEX: 22.19 KG/M2 | OXYGEN SATURATION: 87 % | HEIGHT: 70 IN | WEIGHT: 155 LBS

## 2022-11-01 LAB — HCG UR QL: NEGATIVE

## 2022-11-01 PROCEDURE — 6360000002 HC RX W HCPCS

## 2022-11-01 PROCEDURE — 81025 URINE PREGNANCY TEST: CPT

## 2022-11-01 PROCEDURE — 64445 NJX AA&/STRD SCIATIC NRV IMG: CPT | Performed by: ANESTHESIOLOGY

## 2022-11-01 PROCEDURE — 6370000000 HC RX 637 (ALT 250 FOR IP): Performed by: ANESTHESIOLOGY

## 2022-11-01 PROCEDURE — 3600000014 HC SURGERY LEVEL 4 ADDTL 15MIN: Performed by: ORTHOPAEDIC SURGERY

## 2022-11-01 PROCEDURE — 3700000000 HC ANESTHESIA ATTENDED CARE: Performed by: ORTHOPAEDIC SURGERY

## 2022-11-01 PROCEDURE — 2580000003 HC RX 258: Performed by: ANESTHESIOLOGY

## 2022-11-01 PROCEDURE — 7100000010 HC PHASE II RECOVERY - FIRST 15 MIN: Performed by: ORTHOPAEDIC SURGERY

## 2022-11-01 PROCEDURE — 76942 ECHO GUIDE FOR BIOPSY: CPT | Performed by: ANESTHESIOLOGY

## 2022-11-01 PROCEDURE — 3700000001 HC ADD 15 MINUTES (ANESTHESIA): Performed by: ORTHOPAEDIC SURGERY

## 2022-11-01 PROCEDURE — 3600000004 HC SURGERY LEVEL 4 BASE: Performed by: ORTHOPAEDIC SURGERY

## 2022-11-01 PROCEDURE — 7100000000 HC PACU RECOVERY - FIRST 15 MIN: Performed by: ORTHOPAEDIC SURGERY

## 2022-11-01 PROCEDURE — 27650 REPAIR ACHILLES TENDON: CPT | Performed by: ORTHOPAEDIC SURGERY

## 2022-11-01 PROCEDURE — 2500000003 HC RX 250 WO HCPCS

## 2022-11-01 PROCEDURE — 6360000002 HC RX W HCPCS: Performed by: NURSE PRACTITIONER

## 2022-11-01 PROCEDURE — 7100000001 HC PACU RECOVERY - ADDTL 15 MIN: Performed by: ORTHOPAEDIC SURGERY

## 2022-11-01 PROCEDURE — 6360000002 HC RX W HCPCS: Performed by: ANESTHESIOLOGY

## 2022-11-01 PROCEDURE — 2709999900 HC NON-CHARGEABLE SUPPLY: Performed by: ORTHOPAEDIC SURGERY

## 2022-11-01 RX ORDER — SODIUM CHLORIDE 9 MG/ML
INJECTION, SOLUTION INTRAVENOUS PRN
Status: DISCONTINUED | OUTPATIENT
Start: 2022-11-01 | End: 2022-11-01 | Stop reason: HOSPADM

## 2022-11-01 RX ORDER — NEOSTIGMINE METHYLSULFATE 1 MG/ML
INJECTION, SOLUTION INTRAVENOUS PRN
Status: DISCONTINUED | OUTPATIENT
Start: 2022-11-01 | End: 2022-11-01 | Stop reason: SDUPTHER

## 2022-11-01 RX ORDER — FENTANYL CITRATE 50 UG/ML
100 INJECTION, SOLUTION INTRAMUSCULAR; INTRAVENOUS
Status: COMPLETED | OUTPATIENT
Start: 2022-11-01 | End: 2022-11-01

## 2022-11-01 RX ORDER — SODIUM CHLORIDE 0.9 % (FLUSH) 0.9 %
5-40 SYRINGE (ML) INJECTION PRN
Status: DISCONTINUED | OUTPATIENT
Start: 2022-11-01 | End: 2022-11-01 | Stop reason: HOSPADM

## 2022-11-01 RX ORDER — PROPOFOL 10 MG/ML
INJECTION, EMULSION INTRAVENOUS PRN
Status: DISCONTINUED | OUTPATIENT
Start: 2022-11-01 | End: 2022-11-01 | Stop reason: SDUPTHER

## 2022-11-01 RX ORDER — GLYCOPYRROLATE 0.2 MG/ML
INJECTION INTRAMUSCULAR; INTRAVENOUS PRN
Status: DISCONTINUED | OUTPATIENT
Start: 2022-11-01 | End: 2022-11-01 | Stop reason: SDUPTHER

## 2022-11-01 RX ORDER — DEXAMETHASONE SODIUM PHOSPHATE 4 MG/ML
INJECTION, SOLUTION INTRA-ARTICULAR; INTRALESIONAL; INTRAMUSCULAR; INTRAVENOUS; SOFT TISSUE PRN
Status: DISCONTINUED | OUTPATIENT
Start: 2022-11-01 | End: 2022-11-01 | Stop reason: SDUPTHER

## 2022-11-01 RX ORDER — LIDOCAINE HYDROCHLORIDE 20 MG/ML
INJECTION, SOLUTION EPIDURAL; INFILTRATION; INTRACAUDAL; PERINEURAL PRN
Status: DISCONTINUED | OUTPATIENT
Start: 2022-11-01 | End: 2022-11-01 | Stop reason: SDUPTHER

## 2022-11-01 RX ORDER — MIDAZOLAM HYDROCHLORIDE 2 MG/2ML
2 INJECTION, SOLUTION INTRAMUSCULAR; INTRAVENOUS
Status: COMPLETED | OUTPATIENT
Start: 2022-11-01 | End: 2022-11-01

## 2022-11-01 RX ORDER — SODIUM CHLORIDE, SODIUM LACTATE, POTASSIUM CHLORIDE, CALCIUM CHLORIDE 600; 310; 30; 20 MG/100ML; MG/100ML; MG/100ML; MG/100ML
INJECTION, SOLUTION INTRAVENOUS CONTINUOUS
Status: DISCONTINUED | OUTPATIENT
Start: 2022-11-01 | End: 2022-11-01 | Stop reason: HOSPADM

## 2022-11-01 RX ORDER — SODIUM CHLORIDE 0.9 % (FLUSH) 0.9 %
5-40 SYRINGE (ML) INJECTION EVERY 12 HOURS SCHEDULED
Status: DISCONTINUED | OUTPATIENT
Start: 2022-11-01 | End: 2022-11-01 | Stop reason: HOSPADM

## 2022-11-01 RX ORDER — LIDOCAINE HYDROCHLORIDE 10 MG/ML
1 INJECTION, SOLUTION INFILTRATION; PERINEURAL
Status: DISCONTINUED | OUTPATIENT
Start: 2022-11-01 | End: 2022-11-01 | Stop reason: HOSPADM

## 2022-11-01 RX ORDER — ROCURONIUM BROMIDE 10 MG/ML
INJECTION, SOLUTION INTRAVENOUS PRN
Status: DISCONTINUED | OUTPATIENT
Start: 2022-11-01 | End: 2022-11-01 | Stop reason: SDUPTHER

## 2022-11-01 RX ORDER — ACETAMINOPHEN 500 MG
1000 TABLET ORAL ONCE
Status: COMPLETED | OUTPATIENT
Start: 2022-11-01 | End: 2022-11-01

## 2022-11-01 RX ORDER — ONDANSETRON 2 MG/ML
INJECTION INTRAMUSCULAR; INTRAVENOUS PRN
Status: DISCONTINUED | OUTPATIENT
Start: 2022-11-01 | End: 2022-11-01 | Stop reason: SDUPTHER

## 2022-11-01 RX ADMIN — LIDOCAINE HYDROCHLORIDE 60 MG: 20 INJECTION, SOLUTION EPIDURAL; INFILTRATION; INTRACAUDAL; PERINEURAL at 13:48

## 2022-11-01 RX ADMIN — PROPOFOL 200 MG: 10 INJECTION, EMULSION INTRAVENOUS at 13:48

## 2022-11-01 RX ADMIN — FENTANYL CITRATE 100 MCG: 50 INJECTION, SOLUTION INTRAMUSCULAR; INTRAVENOUS at 11:17

## 2022-11-01 RX ADMIN — ACETAMINOPHEN 1000 MG: 500 TABLET ORAL at 11:27

## 2022-11-01 RX ADMIN — ROCURONIUM BROMIDE 30 MG: 50 INJECTION, SOLUTION INTRAVENOUS at 13:48

## 2022-11-01 RX ADMIN — MIDAZOLAM 2 MG: 1 INJECTION INTRAMUSCULAR; INTRAVENOUS at 11:17

## 2022-11-01 RX ADMIN — Medication 3 MG: at 14:21

## 2022-11-01 RX ADMIN — Medication 2000 MG: at 13:58

## 2022-11-01 RX ADMIN — DEXAMETHASONE SODIUM PHOSPHATE 4 MG: 4 INJECTION, SOLUTION INTRAMUSCULAR; INTRAVENOUS at 13:58

## 2022-11-01 RX ADMIN — MEPIVACAINE HYDROCHLORIDE 8 ML: 15 INJECTION, SOLUTION EPIDURAL; INFILTRATION at 11:21

## 2022-11-01 RX ADMIN — SODIUM CHLORIDE, POTASSIUM CHLORIDE, SODIUM LACTATE AND CALCIUM CHLORIDE: 600; 310; 30; 20 INJECTION, SOLUTION INTRAVENOUS at 11:27

## 2022-11-01 RX ADMIN — ONDANSETRON 4 MG: 2 INJECTION INTRAMUSCULAR; INTRAVENOUS at 14:16

## 2022-11-01 RX ADMIN — GLYCOPYRROLATE 0.6 MG: 0.2 INJECTION, SOLUTION INTRAMUSCULAR; INTRAVENOUS at 14:21

## 2022-11-01 ASSESSMENT — PAIN - FUNCTIONAL ASSESSMENT: PAIN_FUNCTIONAL_ASSESSMENT: 0-10

## 2022-11-01 ASSESSMENT — PAIN DESCRIPTION - DESCRIPTORS: DESCRIPTORS: ACHING

## 2022-11-01 NOTE — OP NOTE
FiberWire suture in a 2 strand grasping [place] type fashion. The repair was then oversewn using a running 2-0 Vicryl suture for additional fixation. The patient's resting tension was well-established the conclusion. The wounds and irrigated and closed using Monocryl nylon sutures. Sterile dressings and applied followed by well-padded posterior splint in plantarflexion. Anesthesia was discontinued. The patient was transferred back to recovery bed. She was taken recovery in satisfactory condition. She appeared to tolerate the procedure well. There were no apparent surgical or anesthetic complications. All needle and sponge counts were correct. A sterile dressing was then applied to the leg and Posterior slab splint. They were awoken from anesthesia and returned to the PACU without difficulty.     Post Operative Plan:   1- WB status: Non-Weight Bearing   2- Immobilization/assistive devices: crutches  3- DVT px: ASA 81 mg BID

## 2022-11-01 NOTE — ANESTHESIA PROCEDURE NOTES
Airway  Date/Time: 11/1/2022 1:51 PM  Urgency: elective    Airway not difficult    General Information and Staff    Patient location during procedure: OR  Resident/CRNA: JEREMIE Rao - CRNA  Performed: resident/CRNA     Indications and Patient Condition  Indications for airway management: anesthesia  Spontaneous Ventilation: absent  Sedation level: deep  Preoxygenated: yes  Patient position: sniffing  MILS not maintained throughout  Mask difficulty assessment: vent by bag mask    Final Airway Details  Final airway type: endotracheal airway      Successful airway: ETT  Cuffed: yes   Successful intubation technique: direct laryngoscopy  Facilitating devices/methods: intubating stylet  Endotracheal tube insertion site: oral  Blade: Barbara  Blade size: #4  ETT size (mm): 7.0  Cormack-Lehane Classification: grade I - full view of glottis  Placement verified by: chest auscultation and capnometry   Measured from: lips  ETT to lips (cm): 23  Number of attempts at approach: 1  Ventilation between attempts: bag mask

## 2022-11-01 NOTE — DISCHARGE INSTRUCTIONS
Post Operative Plan:   1- WB status: Non-Weight Bearing   2- Immobilization/assistive devices: crutches  3- DVT px: ASA 81 mg BID    INSTRUCTIONS FOLLOWING FOOT SURGERY    ACTIVITY  Elevate foot. No Ice  No weight bearing. Use crutches or knee walker until seen in follow up appointment      Blood clot prevention:  As instructed by Dr Carmelita De Los Santos: Take 81 mg aspirin twice daily if okay with your medical doctor and you have no GI ulcer. Get up and out of bed frequently. While in bed move the legs as much as possible. DRESSING CARE   Keep dry and in place until follow up appointment. Cover with cast bag or plastic bag when showering. Let the office know if dressing gets saturated with water. Don't put anything into the splint to relieve itching etc.     CALL YOUR DOCTOR IF YOU HAVE  Excessive bleeding that does not stop after holding mild pressure over the area. Temperature of 101 degrees or above. Redness, excessive swelling or bruising, and/or green or yellow, smelly discharge from incision. Loss of sensation - cold, white or blue toes. DIET  Day of Surgery: Clear liquids until no nausea or vomiting; then light, bland diet (Baked chicken, plain rice, grits, scrambled egg, toast). Nothing Greasy, fried or spicy today  Advance to regular diet on second day, unless your doctor orders otherwise. PAIN  Take pain medications as directed by your doctor. Call your doctor if pain is NOT relieved by medication. MEDICATION INTERACTION:  During your procedure you potentially received a medication or medications which may reduce the effectiveness of oral contraceptives. Please consider other forms of contraception for 1 month following your procedure if you are currently using oral contraceptives as your primary form of birth control.  In addition to this, we recommend continuing your oral contraceptive as prescribed, unless otherwise instructed by your physician, during this time    After general anesthesia or intravenous sedation, for 24 hours or while taking prescription Narcotics:  Limit your activities  A responsible adult needs to be with you for the next 24 hours  Do not drive and operate hazardous machinery  Do not make important personal or business decisions  Do not drink alcoholic beverages  If you have not urinated within 8 hours after discharge, and you are experiencing discomfort from urinary retention, please go to the nearest ED. If you have sleep apnea and have a CPAP machine, please use it for all naps and sleeping. Please use caution when taking narcotics and any of your home medications that may cause drowsiness. *  Please give a list of your current medications to your Primary Care Provider. *  Please update this list whenever your medications are discontinued, doses are      changed, or new medications (including over-the-counter products) are added. *  Please carry medication information at all times in case of emergency situations. These are general instructions for a healthy lifestyle:  No smoking/ No tobacco products/ Avoid exposure to second hand smoke  Surgeon General's Warning:  Quitting smoking now greatly reduces serious risk to your health. Obesity, smoking, and sedentary lifestyle greatly increases your risk for illness  A healthy diet, regular physical exercise & weight monitoring are important for maintaining a healthy lifestyle    You may be retaining fluid if you have a history of heart failure or if you experience any of the following symptoms:  Weight gain of 3 pounds or more overnight or 5 pounds in a week, increased swelling in our hands or feet or shortness of breath while lying flat in bed. Please call your doctor as soon as you notice any of these symptoms; do not wait until your next office visit. Learning About How to Use Crutches  Your Care Instructions  Crutches can help you walk when you have an injured hip, leg, knee, ankle, or foot.  Your doctor will tell you how much weight--if any--you can put on your leg. Be sure your crutches fit you. When you stand up in your normal posture, there should be space for two or three fingers between the top of the crutch and your armpit. When you let your hands hang down, the hand  should be at your wrists. When you put your hands on the hand , your elbows should be slightly bent. To stay safe when using crutches:  Look straight ahead, not down at your feet. Clear away small rugs, cords, or anything else that could cause you to trip, slip, or fall. Be very careful around pets and small children. They can get in your path when you least expect it. Be sure the rubber tips on your crutches are clean and in good condition to help prevent slipping. Avoid slick conditions, such as wet floors and snowy or icy driveways. In bad weather, be especially careful on curbs and steps. How to use crutches  Getting ready to walk    [facility] your elbows slightly. Press the padded top parts of the crutches against your sides, under your armpits. If you have been told not to put any weight on your injured leg, keep that leg bent and off the ground. Walking with crutches    Put both crutches about 12 inches in front of you. Put your weight on the handgrips, not on the pads under your arms. (Constant pressure against your underarms can cause numbness.) Swing your body forward. (If you have been told not to put any weight on your injured leg, keep that leg bent and off the ground.)  To complete the step, put your weight on the strong leg. Move your crutches about 12 inches in front of you, and start the next step. When you're confident using the crutches, you can move the crutches and your injured leg at the same time. Then push straight down on the crutches as you step past the crutches with your strong leg, as you would in normal walking. Take small steps. Use ramps and elevators when you can.   Sitting down    To sit, back up to the chair. Use one hand to hold both crutches by the handgrips, beside your injured leg. With the other hand, hold onto the seat and slowly lower yourself onto the chair. Lay the crutches on the ground near your chair. If you prop them up, they may fall over. Getting up from a chair    To get up from a chair,  the crutches and put them in one hand beside your injured leg. Put your weight on the handgrips of the crutches and on your strong leg to stand up. Walking up stairs    To go up stairs, step up with your strong leg and then bring the crutches and your injured leg to the upper step. For stairs that have handrails: Put both crutches under the arm opposite the handrail. Use the hand opposite the handrail to hold both crutches by the handgrips. Hold onto the handrail as you go up. Put your strong leg on the step first when you go up. Walking down stairs    To go down stairs, put your crutches and injured leg on the lower step. Bring your strong leg to the lower step. This saying may help you remember: \"Up with the good, down with the bad. \"  For stairs that have handrails: Put both crutches under the arm opposite the handrail. Use the hand opposite the handrail to hold both crutches by the handgrips. Hold onto the handrail as you go down. Follow the same process you use for stairs: Lead with your crutches and injured leg on the way down.

## 2022-11-01 NOTE — ANESTHESIA PROCEDURE NOTES
Peripheral Block    Patient location during procedure: pre-op  Reason for block: post-op pain management and at surgeon's request  Start time: 11/1/2022 11:17 AM  End time: 11/1/2022 11:21 AM  Staffing  Performed: anesthesiologist   Anesthesiologist: Valentine Lara MD  Preanesthetic Checklist  Completed: patient identified, IV checked, site marked, risks and benefits discussed, surgical/procedural consents, equipment checked, pre-op evaluation, timeout performed, anesthesia consent given, oxygen available and monitors applied/VS acknowledged  Peripheral Block   Patient position: supine  Prep: ChloraPrep  Provider prep: mask and sterile gloves  Patient monitoring: cardiac monitor, continuous pulse ox, oxygen, IV access, frequent blood pressure checks and responsive to questions  Block type: Sciatic  Popliteal  Laterality: left  Injection technique: single-shot  Guidance: nerve stimulator and ultrasound guided  Local infiltration: lidocaine  Infiltration strength: 1 %  Local infiltration: lidocaine  Dose: 3 mL    Needle   Needle type: insulated echogenic nerve stimulator needle   Needle gauge: 20 G  Needle localization: nerve stimulator and ultrasound guidance (minimal motor response at >0.4 mA)  Needle length: 10 cm  Assessment   Injection assessment: negative aspiration for heme, no paresthesia on injection, local visualized surrounding nerve on ultrasound and no intravascular symptoms  Slow fractionated injection: yes  Hemodynamics: stable  Real-time US image taken/store: yes  Outcomes: patient tolerated procedure well    Additional Notes  Risks/benefits/alternatives discussed including damage to nerve or muscle. Needle inserted and placed in close proximity to the nerve under real time ultrasound guidance. Ultrasound was used to visualize the spread of local anesthetic in close proximity to the nerve being blocked. The nerve appeared anatomically normal and there were no abnormal findings.  A permanent ultrasound image is stored in the chart.     Medications Administered  EPINEPHrine PF 1 MG/ML Soln, ropivacaine 0.5% Soln, sodium chloride (PF) 0.9 % Soln (Mixture components: EPINEPHrine PF 1 MG/ML Soln, 0.005 mL; ropivacaine 0.5% Soln, 0.75 mL; sodium chloride (PF) 0.9 % Soln, 0.245 mL) - Perineural   22 mL - 11/1/2022 11:21:00 AM  mepivacaine 1.5 % - Perineural   8 mL - 11/1/2022 11:21:00 AM

## 2022-11-01 NOTE — H&P
Outpatient Surgery History and Physical:  Gera Sepulveda was seen and examined. CHIEF COMPLAINT:   left foot. PE:   /61   Pulse 52   Temp 98.7 °F (37.1 °C) (Oral)   Resp 18   Ht 5' 10\" (1.778 m)   Wt 155 lb (70.3 kg)   LMP 09/30/2022 (Approximate)   SpO2 100%   BMI 22.24 kg/m²     Heart:   Regular rhythm      Lungs:  Are clear      Past Medical History:  History reviewed. No pertinent past medical history. Surgical History:   Past Surgical History:   Procedure Laterality Date    ANTERIOR CRUCIATE LIGAMENT REPAIR Left     KNEE ARTHROSCOPY Left     TONSILLECTOMY         Social History: Patient  reports that she has never smoked. She has never used smokeless tobacco. She reports that she does not currently use alcohol. She reports that she does not use drugs. Family History: History reviewed. No pertinent family history. Allergies: Reviewed per EMR  Patient has no known allergies. Medications:    Prior to Admission medications    Medication Sig Start Date End Date Taking? Authorizing Provider   cephALEXin (KEFLEX) 500 MG capsule Take 1 capsule by mouth 4 times daily 10/31/22   JEREMIE White CNP   oxyCODONE (ROXICODONE) 5 MG immediate release tablet Take 1 tablet by mouth every 6 hours as needed for Pain for up to 5 days. Intended supply: 5 days. Take lowest dose possible to manage pain 10/31/22 11/5/22  JEREMIE White CNP   aspirin EC 81 MG EC tablet Take 1 tablet by mouth in the morning and at bedtime 10/31/22   JEREMIE White CNP   Azelaic Acid 15 % GEL APPLY TO FACE EVERY MORNING IF TOO IRRITATING APPLY EVERY OTHER DAY 9/16/22   Historical Provider, MD   senna-docusate (Radha Grajeda) 8.6-50 MG per tablet Take 1 tablet by mouth daily 12/20/21   Ar Automatic Reconciliation       The surgery is planned for the Left achilles primary reconstruction   . History and physical has been reviewed. The patient has been examined.  There have been no significant clinical changes since the completion of the originally dated History and Physical.  Patient identified by surgeon; surgical site was confirmed by patient and surgeon. The patient is here today for outpatient surgery. I have examined the patient, no changes are noted in the patient's medical status. Necessity for the procedure/care is still present and the history and physical above is current. See the office notes for the full long term history of the problem. Please see the recent office notes for the musculoskeletal examination.     Signed By: JEREMIE Kamara CNP     November 1, 2022 10:14 AM

## 2022-11-01 NOTE — BRIEF OP NOTE
Brief Postoperative Note      Patient: Rupali Bearden  YOB: 2000  MRN: [id number]    Date of Procedure: 11/1/2022    Pre-Op Diagnosis: Rupture of left Achilles tendon, initial encounter [S86.012A]    Post-Op Diagnosis: Same       Procedure(s):  Left achilles primary reconstruction    Surgeon(s):  Adin White MD    Assistant:  * No surgical staff found *    Anesthesia: General    Estimated Blood Loss (mL): Minimal    Complications: None    Specimens:   * No specimens in log *    Implants:  * No implants in log *      Drains: * No LDAs found *    Findings:     Electronically signed by Analisa Gibbs MD on 11/1/2022 at 2:27 PM

## 2022-11-01 NOTE — ANESTHESIA PRE PROCEDURE
Department of Anesthesiology  Preprocedure Note       Name:  Shaka Bautista   Age:  22 y.o.  :  2000                                          MRN:  [id number]         Date:  2022      Surgeon: Lauryn Garza):  Abhijit Goodman MD    Procedure: Procedure(s):  Left achilles primary reconstruction    Medications prior to admission:   Prior to Admission medications    Medication Sig Start Date End Date Taking? Authorizing Provider   cephALEXin (KEFLEX) 500 MG capsule Take 1 capsule by mouth 4 times daily 10/31/22   JEREMIE Escudero CNP   oxyCODONE (ROXICODONE) 5 MG immediate release tablet Take 1 tablet by mouth every 6 hours as needed for Pain for up to 5 days. Intended supply: 5 days.  Take lowest dose possible to manage pain 10/31/22 11/5/22  JEREMIE Escudero CNP   aspirin EC 81 MG EC tablet Take 1 tablet by mouth in the morning and at bedtime 10/31/22   JEREMIE Escudero - CNP   Azelaic Acid 15 % GEL APPLY TO FACE EVERY MORNING IF TOO IRRITATING APPLY EVERY OTHER DAY 22   Historical Provider, MD   senna-docusate (Dexter Carbajal) 8.6-50 MG per tablet Take 1 tablet by mouth daily 21   Ar Automatic Reconciliation       Current medications:    Current Facility-Administered Medications   Medication Dose Route Frequency Provider Last Rate Last Admin    ceFAZolin (ANCEF) 2000 mg in sterile water 20 mL IV syringe  2,000 mg IntraVENous On Call to Wayne 36, APRN - CNP        lactated ringers infusion   IntraVENous Continuous Christopher Mcdaniel APRN - CNP        sodium chloride flush 0.9 % injection 5-40 mL  5-40 mL IntraVENous 2 times per day Christopher Mcdaniel APRN - CNP        sodium chloride flush 0.9 % injection 5-40 mL  5-40 mL IntraVENous PRN Christopher Violeta, APRN - CNP        0.9 % sodium chloride infusion   IntraVENous PRN Christopher Mcdaniel APRN - CNP        lidocaine 1 % injection 1 mL  1 mL IntraDERmal Once PRN Taiwo Pantoja MD  acetaminophen (TYLENOL) tablet 1,000 mg  1,000 mg Oral Once Jennelle Krabbe, MD        lactated ringers infusion   IntraVENous Continuous Jennelle Krabbe, MD        sodium chloride flush 0.9 % injection 5-40 mL  5-40 mL IntraVENous 2 times per day Jennelle Krabbe, MD        sodium chloride flush 0.9 % injection 5-40 mL  5-40 mL IntraVENous PRN Jennelle Krabbe, MD        0.9 % sodium chloride infusion   IntraVENous PRN Jennelle Krabbe, MD        midazolam PF (VERSED) injection 2 mg  2 mg IntraVENous Once PRN Jennelle Krabbe, MD           Allergies:  No Known Allergies    Problem List:    Patient Active Problem List   Diagnosis Code    Pain and swelling of left knee M25.562, M25.462    Arthrofibrosis of knee joint, left M24.662    Surgery follow-up examination Z09    Internal derangement of left knee M23.92    Closed fracture of radius S52.90XA    Acute lateral meniscus tear of left knee S83.282A    Skin mass R22.9    History of repair of ACL Z98.890    Contusion of right knee S80. 01XA    Pain, coccyx M53.3    Achilles rupture, left U23.085X       Past Medical History:  History reviewed. No pertinent past medical history.     Past Surgical History:        Procedure Laterality Date    ANTERIOR CRUCIATE LIGAMENT REPAIR Left     KNEE ARTHROSCOPY Left     TONSILLECTOMY         Social History:    Social History     Tobacco Use    Smoking status: Never    Smokeless tobacco: Never   Substance Use Topics    Alcohol use: Not Currently                                Counseling given: Not Answered      Vital Signs (Current):   Vitals:    11/01/22 0935 11/01/22 1117 11/01/22 1122 11/01/22 1124   BP: 129/61   110/60   Pulse: 52 (!) 45 54 50   Resp: 18   16   Temp: 98.7 °F (37.1 °C)      TempSrc: Oral      SpO2: 100% 100% 100% 100%   Weight: 155 lb (70.3 kg)      Height:                                                  BP Readings from Last 3 Encounters:   11/01/22 110/60       NPO Status: Time of last liquid consumption: 1700                        Time of last solid consumption: 1700                        Date of last liquid consumption: 10/31/22                        Date of last solid food consumption: 10/31/22    BMI:   Wt Readings from Last 3 Encounters:   11/01/22 155 lb (70.3 kg)   12/16/21 155 lb (70.3 kg)   09/02/21 160 lb (72.6 kg) (87 %, Z= 1.14)*     * Growth percentiles are based on CDC (Girls, 2-20 Years) data. Body mass index is 22.24 kg/m². CBC: No results found for: WBC, RBC, HGB, HCT, MCV, RDW, PLT    CMP: No results found for: NA, K, CL, CO2, BUN, CREATININE, GFRAA, AGRATIO, LABGLOM, GLUCOSE, GLU, PROT, CALCIUM, BILITOT, ALKPHOS, AST, ALT    POC Tests: No results for input(s): POCGLU, POCNA, POCK, POCCL, POCBUN, POCHEMO, POCHCT in the last 72 hours. Coags: No results found for: PROTIME, INR, APTT    HCG (If Applicable):   Lab Results   Component Value Date    PREGTESTUR Negative 11/01/2022        ABGs: No results found for: PHART, PO2ART, IEQ7JVF, XZP4SCH, BEART, U6YHZIJP     Type & Screen (If Applicable):  No results found for: LABABO, LABRH    Drug/Infectious Status (If Applicable):  No results found for: HIV, HEPCAB    COVID-19 Screening (If Applicable): No results found for: COVID19        Anesthesia Evaluation  Patient summary reviewed  Airway: Mallampati: I  TM distance: >3 FB   Neck ROM: full     Dental: normal exam         Pulmonary:Negative Pulmonary ROS and normal exam  breath sounds clear to auscultation                             Cardiovascular:Negative CV ROS  Exercise tolerance: good (>4 METS),           Rhythm: regular  Rate: normal                 ROS comment: Pt denies recent CP, SOB or changes in functional status     Neuro/Psych:   Negative Neuro/Psych ROS              GI/Hepatic/Renal: Neg GI/Hepatic/Renal ROS            Endo/Other: Negative Endo/Other ROS                    Abdominal:              PE comment: Deferred   Vascular: negative vascular ROS.          Other Findings: Anesthesia Plan      general     ASA 1       Induction: intravenous. Anesthetic plan and risks discussed with patient.               Post-op pain plan if not by surgeon: single peripheral nerve block            Fabiola Humphrey MD   11/1/2022

## 2022-11-01 NOTE — ANESTHESIA POSTPROCEDURE EVALUATION
Department of Anesthesiology  Postprocedure Note    Patient: Marlan Councilman  MRN: [id number]  YOB: 2000  Date of evaluation: 11/1/2022      Procedure Summary     Date: 11/01/22 Room / Location: [facility] OP OR 06 / [facility]    Anesthesia Start: 1333 Anesthesia Stop: 1449    Procedure: Left achilles primary reconstruction (Left: Ankle) Diagnosis:       Rupture of left Achilles tendon, initial encounter      (Rupture of left Achilles tendon, initial encounter [B44.834B])    Surgeons: Herschel Cowden, MD Responsible Provider: Dennie Hof, MD    Anesthesia Type: general ASA Status: 1          Anesthesia Type: No value filed.     Indu Phase I: Indu Score: 7    Indu Phase II: Indu Score: 9      Anesthesia Post Evaluation    Patient location during evaluation: PACU  Patient participation: complete - patient participated  Level of consciousness: awake and alert  Airway patency: patent  Nausea & Vomiting: no nausea and no vomiting  Complications: no  Cardiovascular status: hemodynamically stable  Respiratory status: acceptable, nonlabored ventilation and spontaneous ventilation  Hydration status: euvolemic  Comments: /66   Pulse 72   Temp 98.2 °F (36.8 °C) (Temporal)   Resp 16   Ht 5' 10\" (1.778 m)   Wt 155 lb (70.3 kg)   LMP 09/30/2022 (Approximate)   SpO2 (!) 87%   BMI 22.24 kg/m²     Multimodal analgesia pain management approach

## 2022-11-01 NOTE — ANESTHESIA PROCEDURE NOTES
Due to stroke  Aggressive therapy with PT/OT   Peripheral Block    Patient location during procedure: pre-op  Reason for block: post-op pain management and at surgeon's request  Start time: 11/1/2022 11:22 AM  End time: 11/1/2022 11:24 AM  Staffing  Performed: anesthesiologist   Anesthesiologist: Sedrick Howe MD  Preanesthetic Checklist  Completed: patient identified, IV checked, site marked, risks and benefits discussed, surgical/procedural consents, equipment checked, pre-op evaluation, timeout performed, anesthesia consent given, oxygen available and monitors applied/VS acknowledged  Peripheral Block   Patient position: supine  Prep: ChloraPrep  Provider prep: mask and sterile gloves  Patient monitoring: cardiac monitor, continuous pulse ox, frequent blood pressure checks, IV access, oxygen and responsive to questions  Block type: Femoral  Adductor canal  Laterality: left  Injection technique: single-shot  Guidance: ultrasound guided  Local infiltration: lidocaine  Infiltration strength: 1 %  Local infiltration: lidocaine  Dose: 3 mL    Needle   Needle type: insulated echogenic nerve stimulator needle   Needle gauge: 20 G  Needle localization: ultrasound guidance  Needle length: 10 cm  Assessment   Injection assessment: negative aspiration for heme, no paresthesia on injection, no intravascular symptoms and local visualized surrounding nerve on ultrasound  Slow fractionated injection: yes  Hemodynamics: stable  Real-time US image taken/store: yes  Outcomes: patient tolerated procedure well    Additional Notes  Risks/benefits/alternatives discussed including damage to nerve or muscle. Needle inserted and placed in close proximity to the nerve under real time ultrasound guidance. Ultrasound was used to visualize the spread of local anesthetic in close proximity to the nerve being blocked. The nerve appeared anatomically normal and there were no abnormal findings. A permanent ultrasound image is stored in the chart.   Medications Administered  EPINEPHrine PF 1 MG/ML Soln, ropivacaine 0.5% Soln, sodium chloride (PF) 0.9 % Soln (Mixture components: EPINEPHrine PF 1 MG/ML Soln, 0.005 mL; ropivacaine 0.5% Soln, 0.75 mL; sodium chloride (PF) 0.9 % Soln, 0.245 mL) - Perineural   15 mL - 11/1/2022 11:24:00 AM  mepivacaine 1.5 % - Perineural   8 mL - 11/1/2022 11:24:00 AM

## 2022-11-03 ENCOUNTER — TELEPHONE (OUTPATIENT)
Dept: ORTHOPEDIC SURGERY | Age: 22
End: 2022-11-03

## 2022-11-03 NOTE — TELEPHONE ENCOUNTER
Her mom is calling to see if she can add Ibuprofen to the medication she's taking. I told her she can take up to 2400mg of Ibuprofen in a day and that's the prescription strength. She will do that and also increase the oxycodone to 2 q 4 hours for a day or two and when the pain decreases she will go back to 1 q4 or 1 q6. The mother expressed understanding.

## 2022-11-16 ENCOUNTER — HOSPITAL ENCOUNTER (OUTPATIENT)
Dept: ULTRASOUND IMAGING | Age: 22
Discharge: HOME | End: 2022-11-19

## 2022-11-16 ENCOUNTER — OFFICE VISIT (OUTPATIENT)
Dept: ORTHOPEDIC SURGERY | Age: 22
End: 2022-11-16
Payer: COMMERCIAL

## 2022-11-16 DIAGNOSIS — M79.89 SWELLING OF CALF: ICD-10-CM

## 2022-11-16 DIAGNOSIS — M79.662 PAIN OF LEFT CALF: ICD-10-CM

## 2022-11-16 DIAGNOSIS — S86.012A RUPTURE OF LEFT ACHILLES TENDON, INITIAL ENCOUNTER: Primary | ICD-10-CM

## 2022-11-16 PROCEDURE — 99024 POSTOP FOLLOW-UP VISIT: CPT | Performed by: NURSE PRACTITIONER

## 2022-11-16 PROCEDURE — L4360 PNEUMAT WALKING BOOT PRE CST: HCPCS | Performed by: NURSE PRACTITIONER

## 2022-11-16 NOTE — PROGRESS NOTES
The patient was prescribed a walker boot for the patient's left foot. The patient wears a size NA shoe and I fitted them with a M size boot. The patient was fitted and instructed on the use of prescribed walker boot. I explained how to fit themselves and that the plastic flexible piece should always be on the front of the boot and secured by the Velcro straps on top. The air bladder in the boot was adjusted according to proper fit and comfort. The patient walked a short distance and acknowledged satisfaction with current fit. I also explained that they need a heel lift or a higher heeled shoe for the uninvolved LE to help normalize gait and avoid excessive low back stress/strain due to leg length inequality created from walker boot. Patient read and signed documenting they understand and agree to [facility]'s current DME return policy.

## 2022-11-16 NOTE — PROGRESS NOTES
Name: Adrien Alejo  YOB: 2000  Gender: female  MRN: [id number]    Procedure Performed:Left primary repair of Achilles tendon rupture        Date of Procedure: 11/01/2022      Subjective: Patient notes that she has no more pain in the Achilles area. She does note that she is had some cramping sensations in her calf and she does report that she is stopped taking her aspirin about a week ago which was for DVT prevention. Physical Examination: Patient's incision does appear to be healing well and shows no signs of infection. She has a palpable pedal pulse however I was unable to palpate a posterior tibial pulse. Her toes are extremely swollen and are discolored. There is mild ecchymosis present throughout the toes and at the base of the toes. Patient has noted calf pain on the medial side of her calf. She does have a negative Homans' sign during today's visit. Upon palpation to the Achilles incision, the repair is intact. Imaging:   No imaging reviewed          Assessment:   Status post left primary repair of Achilles tendon rupture. We discussed having a positive ultrasound result today, and how to progress with her care if she does have a DVT. Progression of care was discussed as well as expectations until her next follow-up visit. Questions and concerns were addressed, she verbalized understanding of today's conversation. Plan:   3 This is stable chronic illness/condition  Treatment at this time:  Sutures were removed, Steri-Strips were placed. Patient will be placed into a cam walker boot and remain nonweightbearing on the extremity as a matter medical necessity. She will be receiving a lower extremity Doppler ultrasound for evaluation of DVT today. She was encouraged to continue elevating the affected foot as needed for swelling. She will resume taking her aspirin as DVT prophylaxis.   She may now get the affected extremity wet including showering and soaking as needed, recommendation of Epsom salts was given to help with additional incisional healing as well as swelling purposes. She will follow-up in 2 weeks or sooner if needed.   Studies ordered: Duplex Ultrasound to rule out DVT ordered at this Visit    Weight-bearing status: NWB        Return to work/work restrictions: none  No medications given

## 2022-11-30 ENCOUNTER — OFFICE VISIT (OUTPATIENT)
Dept: ORTHOPEDIC SURGERY | Age: 22
End: 2022-11-30

## 2022-11-30 DIAGNOSIS — S86.012D RUPTURE OF LEFT ACHILLES TENDON, SUBSEQUENT ENCOUNTER: Primary | ICD-10-CM

## 2022-11-30 PROCEDURE — 99024 POSTOP FOLLOW-UP VISIT: CPT | Performed by: NURSE PRACTITIONER

## 2022-11-30 NOTE — PROGRESS NOTES
Name: Denny Zamora  YOB: 2000  Gender: female  MRN: [id number]    Procedure Performed:Left achilles primary reconstruction - Left        Date of Procedure: 11/1/2022      Subjective: Doing well      Physical Examination: Repair is intact. She has expected swelling. There is no sign of infection or DVT. Imaging:             Assessment:   Left Illes tendon repair      Plan:   3 This is stable chronic illness/condition  Treatment at this time: Time with no intervention  Studies ordered: NO XR needed @ Next Visit    Weight-bearing status: WBAT        Return to work/work restrictions: none  No medications given        She will begin weightbearing as tolerated in her walker boot. She will start open chain exercises return in 4 weeks to start formal physical therapy.

## 2022-12-15 ENCOUNTER — APPOINTMENT (RX ONLY)
Dept: URBAN - METROPOLITAN AREA CLINIC 45 | Facility: CLINIC | Age: 22
Setting detail: DERMATOLOGY
End: 2022-12-15

## 2022-12-15 DIAGNOSIS — L70.8 OTHER ACNE: ICD-10-CM | Status: WELL CONTROLLED

## 2022-12-15 PROCEDURE — ? PRESCRIPTION MEDICATION MANAGEMENT

## 2022-12-15 PROCEDURE — ? MEDICATION COUNSELING

## 2022-12-15 PROCEDURE — ? ADDITIONAL NOTES

## 2022-12-15 PROCEDURE — ? FULL BODY SKIN EXAM - DECLINED

## 2022-12-15 PROCEDURE — 99213 OFFICE O/P EST LOW 20 MIN: CPT

## 2022-12-15 PROCEDURE — ? COUNSELING

## 2022-12-15 PROCEDURE — ? PRESCRIPTION

## 2022-12-15 RX ORDER — TRETIONIN 0.5 MG/G
CREAM TOPICAL QHS
Qty: 45 | Refills: 5 | Status: ERX

## 2022-12-15 RX ORDER — AZELAIC ACID 0.15 G/G
GEL TOPICAL
Qty: 50 | Refills: 5 | Status: ERX

## 2022-12-15 ASSESSMENT — LOCATION DETAILED DESCRIPTION DERM
LOCATION DETAILED: LEFT LOWER CUTANEOUS LIP
LOCATION DETAILED: RIGHT INFERIOR MEDIAL FOREHEAD
LOCATION DETAILED: LEFT INFERIOR CENTRAL MALAR CHEEK
LOCATION DETAILED: RIGHT INFERIOR CENTRAL MALAR CHEEK

## 2022-12-15 ASSESSMENT — LOCATION SIMPLE DESCRIPTION DERM
LOCATION SIMPLE: RIGHT FOREHEAD
LOCATION SIMPLE: LEFT LIP
LOCATION SIMPLE: LEFT CHEEK
LOCATION SIMPLE: RIGHT CHEEK

## 2022-12-15 ASSESSMENT — LOCATION ZONE DERM
LOCATION ZONE: LIP
LOCATION ZONE: FACE

## 2022-12-15 NOTE — PROCEDURE: COUNSELING
Bactrim Counseling:  I discussed with the patient the risks of sulfa antibiotics including but not limited to GI upset, allergic reaction, drug rash, diarrhea, dizziness, photosensitivity, and yeast infections.  Rarely, more serious reactions can occur including but not limited to aplastic anemia, agranulocytosis, methemoglobinemia, blood dyscrasias, liver or kidney failure, lung infiltrates or desquamative/blistering drug rashes.
Tazorac Counseling:  Patient advised that medication is irritating and drying.  Patient may need to apply sparingly and wash off after an hour before eventually leaving it on overnight.  The patient verbalized understanding of the proper use and possible adverse effects of tazorac.  All of the patient's questions and concerns were addressed.
Aklief Pregnancy And Lactation Text: It is unknown if this medication is safe to use during pregnancy.  It is unknown if this medication is excreted in breast milk.  Breastfeeding women should use the topical cream on the smallest area of the skin for the shortest time needed while breastfeeding.  Do not apply to nipple and areola.
Doxycycline Pregnancy And Lactation Text: This medication is Pregnancy Category D and not consider safe during pregnancy. It is also excreted in breast milk but is considered safe for shorter treatment courses.
Minocycline Counseling: Patient advised regarding possible photosensitivity and discoloration of the teeth, skin, lips, tongue and gums.  Patient instructed to avoid sunlight, if possible.  When exposed to sunlight, patients should wear protective clothing, sunglasses, and sunscreen.  The patient was instructed to call the office immediately if the following severe adverse effects occur:  hearing changes, easy bruising/bleeding, severe headache, or vision changes.  The patient verbalized understanding of the proper use and possible adverse effects of minocycline.  All of the patient's questions and concerns were addressed.
Include Pregnancy/Lactation Warning?: No
Tetracycline Pregnancy And Lactation Text: This medication is Pregnancy Category D and not consider safe during pregnancy. It is also excreted in breast milk.
Topical Clindamycin Counseling: Patient counseled that this medication may cause skin irritation or allergic reactions.  In the event of skin irritation, the patient was advised to reduce the amount of the drug applied or use it less frequently.   The patient verbalized understanding of the proper use and possible adverse effects of clindamycin.  All of the patient's questions and concerns were addressed.
Dapsone Pregnancy And Lactation Text: This medication is Pregnancy Category C and is not considered safe during pregnancy or breast feeding.
Azithromycin Counseling:  I discussed with the patient the risks of azithromycin including but not limited to GI upset, allergic reaction, drug rash, diarrhea, and yeast infections.
Topical Retinoid counseling:  Patient advised to apply a pea-sized amount only at bedtime and wait 30 minutes after washing their face before applying.  If too drying, patient may add a non-comedogenic moisturizer. The patient verbalized understanding of the proper use and possible adverse effects of retinoids.  All of the patient's questions and concerns were addressed.
Topical Sulfur Applications Counseling: Topical Sulfur Counseling: Patient counseled that this medication may cause skin irritation or allergic reactions.  In the event of skin irritation, the patient was advised to reduce the amount of the drug applied or use it less frequently.   The patient verbalized understanding of the proper use and possible adverse effects of topical sulfur application.  All of the patient's questions and concerns were addressed.
High Dose Vitamin A Counseling: Side effects reviewed, pt to contact office should one occur.
Spironolactone Pregnancy And Lactation Text: This medication can cause feminization of the male fetus and should be avoided during pregnancy. The active metabolite is also found in breast milk.
Benzoyl Peroxide Counseling: Patient counseled that medicine may cause skin irritation and bleach clothing.  In the event of skin irritation, the patient was advised to reduce the amount of the drug applied or use it less frequently.   The patient verbalized understanding of the proper use and possible adverse effects of benzoyl peroxide.  All of the patient's questions and concerns were addressed.
Isotretinoin Counseling: Patient should get monthly blood tests, not donate blood, not drive at night if vision affected, not share medication, and not undergo elective surgery for 6 months after tx completed. Side effects reviewed, pt to contact office should one occur.
Birth Control Pills Pregnancy And Lactation Text: This medication should be avoided if pregnant and for the first 30 days post-partum.
Winlevi Counseling:  I discussed with the patient the risks of topical clascoterone including but not limited to erythema, scaling, itching, and stinging. Patient voiced their understanding.
Tazorac Pregnancy And Lactation Text: This medication is not safe during pregnancy. It is unknown if this medication is excreted in breast milk.
Azelaic Acid Counseling: Patient counseled that medicine may cause skin irritation and to avoid applying near the eyes.  In the event of skin irritation, the patient was advised to reduce the amount of the drug applied or use it less frequently.   The patient verbalized understanding of the proper use and possible adverse effects of azelaic acid.  All of the patient's questions and concerns were addressed.
Erythromycin Counseling:  I discussed with the patient the risks of erythromycin including but not limited to GI upset, allergic reaction, drug rash, diarrhea, increase in liver enzymes, and yeast infections.
Bactrim Pregnancy And Lactation Text: This medication is Pregnancy Category D and is known to cause fetal risk.  It is also excreted in breast milk.
Topical Retinoid Pregnancy And Lactation Text: This medication is Pregnancy Category C. It is unknown if this medication is excreted in breast milk.
Aklief counseling:  Patient advised to apply a pea-sized amount only at bedtime and wait 30 minutes after washing their face before applying.  If too drying, patient may add a non-comedogenic moisturizer.  The most commonly reported side effects including irritation, redness, scaling, dryness, stinging, burning, itching, and increased risk of sunburn.  The patient verbalized understanding of the proper use and possible adverse effects of retinoids.  All of the patient's questions and concerns were addressed.
Azithromycin Pregnancy And Lactation Text: This medication is considered safe during pregnancy and is also secreted in breast milk.
Doxycycline Counseling:  Patient counseled regarding possible photosensitivity and increased risk for sunburn.  Patient instructed to avoid sunlight, if possible.  When exposed to sunlight, patients should wear protective clothing, sunglasses, and sunscreen.  The patient was instructed to call the office immediately if the following severe adverse effects occur:  hearing changes, easy bruising/bleeding, severe headache, or vision changes.  The patient verbalized understanding of the proper use and possible adverse effects of doxycycline.  All of the patient's questions and concerns were addressed.
Topical Clindamycin Pregnancy And Lactation Text: This medication is Pregnancy Category B and is considered safe during pregnancy. It is unknown if it is excreted in breast milk.
Benzoyl Peroxide Pregnancy And Lactation Text: This medication is Pregnancy Category C. It is unknown if benzoyl peroxide is excreted in breast milk.
High Dose Vitamin A Pregnancy And Lactation Text: High dose vitamin A therapy is contraindicated during pregnancy and breast feeding.
Tetracycline Counseling: Patient counseled regarding possible photosensitivity and increased risk for sunburn.  Patient instructed to avoid sunlight, if possible.  When exposed to sunlight, patients should wear protective clothing, sunglasses, and sunscreen.  The patient was instructed to call the office immediately if the following severe adverse effects occur:  hearing changes, easy bruising/bleeding, severe headache, or vision changes.  The patient verbalized understanding of the proper use and possible adverse effects of tetracycline.  All of the patient's questions and concerns were addressed. Patient understands to avoid pregnancy while on therapy due to potential birth defects.
Dapsone Counseling: I discussed with the patient the risks of dapsone including but not limited to hemolytic anemia, agranulocytosis, rashes, methemoglobinemia, kidney failure, peripheral neuropathy, headaches, GI upset, and liver toxicity.  Patients who start dapsone require monitoring including baseline LFTs and weekly CBCs for the first month, then every month thereafter.  The patient verbalized understanding of the proper use and possible adverse effects of dapsone.  All of the patient's questions and concerns were addressed.
Topical Sulfur Applications Pregnancy And Lactation Text: This medication is Pregnancy Category C and has an unknown safety profile during pregnancy. It is unknown if this topical medication is excreted in breast milk.
Azelaic Acid Pregnancy And Lactation Text: This medication is considered safe during pregnancy and breast feeding.
Isotretinoin Pregnancy And Lactation Text: This medication is Pregnancy Category X and is considered extremely dangerous during pregnancy. It is unknown if it is excreted in breast milk.
Spironolactone Counseling: Patient advised regarding risks of diarrhea, abdominal pain, hyperkalemia, birth defects (for female patients), liver toxicity and renal toxicity. The patient may need blood work to monitor liver and kidney function and potassium levels while on therapy. The patient verbalized understanding of the proper use and possible adverse effects of spironolactone.  All of the patient's questions and concerns were addressed.
Winlevi Pregnancy And Lactation Text: This medication is considered safe during pregnancy and breastfeeding.
Sarecycline Counseling: Patient advised regarding possible photosensitivity and discoloration of the teeth, skin, lips, tongue and gums.  Patient instructed to avoid sunlight, if possible.  When exposed to sunlight, patients should wear protective clothing, sunglasses, and sunscreen.  The patient was instructed to call the office immediately if the following severe adverse effects occur:  hearing changes, easy bruising/bleeding, severe headache, or vision changes.  The patient verbalized understanding of the proper use and possible adverse effects of sarecycline.  All of the patient's questions and concerns were addressed.
Detail Level: Detailed
Erythromycin Pregnancy And Lactation Text: This medication is Pregnancy Category B and is considered safe during pregnancy. It is also excreted in breast milk.
Birth Control Pills Counseling: Birth Control Pill Counseling: I discussed with the patient the potential side effects of OCPs including but not limited to increased risk of stroke, heart attack, thrombophlebitis, deep venous thrombosis, hepatic adenomas, breast changes, GI upset, headaches, and depression.  The patient verbalized understanding of the proper use and possible adverse effects of OCPs. All of the patient's questions and concerns were addressed.

## 2022-12-15 NOTE — PROCEDURE: MEDICATION COUNSELING
Consent: The patient's consent was obtained including but not limited to risks of crusting, scabbing, blistering, scarring, darker or lighter pigmentary change, recurrence, incomplete removal and infection. Post-Care Instructions: Reviewed in detail post-care instructions. Patient is to wear sun protection, and avoid picking at any of the treated lesions. Pt may apply Vaseline to crusted or scabbing areas. Detail Level: Detailed Number Of Freeze-Thaw Cycles: 2 freeze-thaw cycles Render Post-Care Instructions In Note?: yes Render Note In Bullet Format When Appropriate: No Duration Of Freeze Thaw-Cycle (Seconds): 6 Imiquimod Counseling:  I discussed with the patient the risks of imiquimod including but not limited to erythema, scaling, itching, weeping, crusting, and pain.  Patient understands that the inflammatory response to imiquimod is variable from person to person and was educated regarded proper titration schedule.  If flu-like symptoms develop, patient knows to discontinue the medication and contact us.

## 2022-12-15 NOTE — PROCEDURE: MEDICATION COUNSELING
Patient notified we accept her transfer.  Patient notified needs growth at 36 weeks with MFM + OB visit.  Patient voiced understanding  of information given and denies any questions.   Patient made appointments.     Hydroxyzine Pregnancy And Lactation Text: This medication is not safe during pregnancy and should not be taken. It is also excreted in breast milk and breast feeding isn't recommended.

## 2022-12-15 NOTE — PROCEDURE: PRESCRIPTION MEDICATION MANAGEMENT
Detail Level: Zone
Render In Strict Bullet Format?: No
Continue Regimen: tretinoin 0.05 % topical cream Qhs\\nQuantity: 45.0 g  Days Supply: 30\\nSig: Apply a pea-size amount to face every night as tolerated\\n\\nazelaic acid 15 % topical gel \\nQuantity: 50.0 g  Days Supply: 30\\nSig: Apply to face and neck qam

## 2023-01-03 DIAGNOSIS — S86.012D RUPTURE OF LEFT ACHILLES TENDON, SUBSEQUENT ENCOUNTER: Primary | ICD-10-CM

## 2023-01-03 DIAGNOSIS — G89.18 ACUTE POST-OPERATIVE PAIN: ICD-10-CM

## 2023-01-11 ENCOUNTER — OFFICE VISIT (OUTPATIENT)
Dept: ORTHOPEDIC SURGERY | Age: 23
End: 2023-01-11

## 2023-01-11 DIAGNOSIS — S86.012A RUPTURE OF LEFT ACHILLES TENDON, INITIAL ENCOUNTER: Primary | ICD-10-CM

## 2023-01-11 DIAGNOSIS — M79.662 PAIN OF LEFT CALF: ICD-10-CM

## 2023-01-11 PROCEDURE — 99024 POSTOP FOLLOW-UP VISIT: CPT | Performed by: ORTHOPAEDIC SURGERY

## 2023-01-11 NOTE — PROGRESS NOTES
Name: Ga Hanson  YOB: 2000  Gender: female  MRN: [id number]    Procedure Performed:Left achilles primary reconstruction - Left        Date of Procedure: 11/1/2022      Subjective: Pain well      Physical Examination: Incision is healed well. She has good strength.         Imaging:   No imaging reviewed           Jarrod Bhakta III, MD           Assessment:   Left Achilles tendon repair      Plan:   3 This is stable chronic illness/condition  Treatment at this time: Physical Therapy  Studies ordered: NO XR needed @ Next Visit    Weight-bearing status: WBAT        Return to work/work restrictions: none  No medications given

## 2023-01-12 ENCOUNTER — HOSPITAL ENCOUNTER (OUTPATIENT)
Dept: PHYSICAL THERAPY | Age: 23
Setting detail: RECURRING SERIES
Discharge: HOME | End: 2023-01-15
Payer: COMMERCIAL

## 2023-01-12 PROCEDURE — 97161 PT EVAL LOW COMPLEX 20 MIN: CPT

## 2023-01-12 PROCEDURE — 97110 THERAPEUTIC EXERCISES: CPT

## 2023-01-12 PROCEDURE — 97016 VASOPNEUMATIC DEVICE THERAPY: CPT

## 2023-01-12 NOTE — PROGRESS NOTES
Karo Hendrickson  : 2000  Primary: Bcbs Sc (Cheri ALBARADO)  Secondary: BMI BENEFITS [facility] [place] [id number] [place] [address]  Phone: [phone]  Fax: [phone] Plan Frequency: 2-3x per week    Plan of Care/Certification Expiration Date: 23      PT Visit Info:  Plan Frequency: 2-3x per week  Plan of Care/Certification Expiration Date: 23      Visit Count:  1    OUTPATIENT PHYSICAL THERAPY:OP NOTE TYPE: Treatment Note 2023       Episode  }Appt Desk             Treatment Diagnosis:  Difficulty in walking, Not elsewhere classified (R26.2)  Other abnormalities of gait and mobility (R26.89)  Medical/Referring Diagnosis:  Rupture of left Achilles tendon, subsequent encounter [S86.012D]  Acute post-operative pain [G89.18]  Referring Physician:  Cody Tipton III, MD MD Orders:  PT Eval and Treat   Date of Onset:  Onset Date: 22     Allergies:   Patient has no known allergies.  Restrictions/Precautions:  Restrictions/Precautions: Surgical Protocols  No data recorded   Interventions Planned (Treatment may consist of any combination of the following):    Current Treatment Recommendations: Strengthening; ROM; Balance training; Functional mobility training; ADL/Self-care training; Gait training; Stair training; Neuromuscular re-education; Manual; Home exercise program; Modalities; Therapeutic activities     Subjective Comments: See IE 23.      Initial:}     0/10Post Session:        0/10  Medications Last Reviewed:  2023  Updated Objective Findings:  See evaluation note from today  Treatment   TREATMENT:   THERAPEUTIC ACTIVITY: ( see below for minutes): Therapeutic activities per grid below to improve mobility, strength, balance and coordination. Required minimal visual, verbal, manual and tactile cues to improve independence and safety with daily activities .  THERAPEUTIC EXERCISE: (see below for minutes): Exercises per grid below to improve  mobility, strength, balance and coordination. Required minimal verbal and manual cues to promote proper body alignment, promote proper body posture and promote proper body mechanics. Progressed resistance, range, repetitions and complexity of movement as indicated. MANUAL THERAPY: (see below for minutes): Joint mobilization and Soft tissue mobilization was utilized and necessary because of the patient's restricted joint motion, painful spasm, loss of articular motion and restricted motion of soft tissue. MODALITIES: (see below for minutes): to decrease pain   SELF CARE: (see below for minutes): Procedure(s) (per grid) utilized to improve and/or restore self-care/home management as related to dressing, bathing and grooming. Required minimal verbal cueing to facilitate activities of daily living skills and compensatory activities    Date: 1/12/23        Modalities:        Gameready 10 min                       Therapeutic Exercise: 20 min        HEP/precaution review        Ankle PF AROM  X20 knee extended  X20 knee flexed        Toe curls  3x10        Ankle INV/EVR   2x10   red        Weight shifting in walking stance               Proprioceptive Activities:                                Manual Therapy:                        Functional Activities:                                            Treatment/Session Summary:    Treatment Assessment: See IE 1/12/23. Communication/Consultation:  None today  Equipment provided today:  None  Recommendations/Intent for next treatment session: Next visit will focus on ROM, strengthening and neuromuscular re-education per Achilles repair protocol.     Total Treatment Billable Duration:  55 minutes  Time In: 0845  Time Out: 0945    VALENTIN OSWALD PT       Charge Capture  }Post Session Pain  PT Visit Info  [facility] Portal  MD Guidelines  Scanned Media  Benefits  MyChart    Future Appointments   Date Time Provider Ruslan Jiménez   1/13/2023  8:45 AM Larry Poster, PT [facility]   1/16/2023  8:45 AM Jerrol Hasten, PT [facility]   1/18/2023  2:00 PM Jerrol Hasten, PT [facility]   1/20/2023  8:45 AM Jerrol Hasten, PT [facility]   1/23/2023  8:45 AM Herman Park, PT [facility]   1/25/2023  8:45 AM Jerrol Hasten, PT [facility]   1/27/2023  8:45 AM Jerrol Hasten, PT [facility]   1/30/2023  8:45 AM Jerrol Hasten, PT [facility]   2/1/2023  8:45 AM Jerrol Hasten, PT [facility]   2/3/2023  8:45 AM Jerrol Hasten, PT [facility]   2/6/2023  8:45 AM Jerrol Hasten, PT [facility]   2/8/2023  8:45 AM Jerrol Hasten, PT [facility]   2/10/2023  8:45 AM Jerrol Hasten, PT [facility]   2/13/2023  8:45 AM Jerrol Hasten, PT [facility]   2/15/2023  2:00 PM Jerrol Hasten, PT [facility]   2/17/2023  8:45 AM Jerrol Hasten, PT [facility]   2/20/2023  8:45 AM Jerrol Hasten, PT [facility]   2/22/2023  8:45 AM Jerrol Hasten, PT [facility]   2/22/2023  3:00 PM Brynn Sky III, MD [facility]   2/24/2023  8:45 AM Jerrol Hasten, PT [facility]   2/27/2023  8:45 AM Jerrol Hasten, PT [facility]   3/1/2023  8:45 AM Jerrol Hasten, PT [facility]   3/3/2023  8:45 AM Jerrol Hasten, PT [facility]

## 2023-01-12 NOTE — PLAN OF CARE
Whitney Billy  : 2000  [facility]: [facility])  Secondary: Seun Alvarado @ [address] [facility] [address] [address]  Phone: [phone]  Fax: [phone] Plan Frequency: 2-3x per week  Plan of Care/Certification Expiration Date: 23    PT Visit Info:    Plan Frequency: 2-3x per week  Plan of Care/Certification Expiration Date: 23    Visit Count:  1                OUTPATIENT PHYSICAL THERAPY:             OP NOTE TYPE: Initial Assessment 2023               Episode  Appt Desk         Treatment Diagnosis:  Difficulty in walking, Not elsewhere classified (R26.2)  Other abnormalities of gait and mobility (R26.89)  Medical/Referring Diagnosis:  Rupture of left Achilles tendon, subsequent encounter [S86.012D]  Acute post-operative pain [G89.18]  Referring Physician:  Pawan Fontanez MD MD Orders:  PT Eval and Treat   Return MD Appt:  23  Date of Onset:  Onset Date: 22    Allergies:  Patient has no known allergies. Restrictions/Precautions:    Restrictions/Precautions: Surgical Protocols      Medications Last Reviewed:  2023     SUBJECTIVE   History of Injury/Illness (Reason for Referral): Was playing in her last game of season, landed from jumping and felt like someone kicked her in back of leg. Followed with orthopedist the following day who confirmed Achilles tear. Subsequently had surgery for repair on . Did not complete formal PT after transitioning out of walking boot due to going home for winter break but has been completing HEP approved by her . Reports stiffness at calf/heel with minimal to no pain. Previous history of L ACLR and cyclops lesion last year. Patient Stated Goal(s):  \"Return to soccer\"  Initial:      010 Post Session:      010  Past Medical History/Comorbidities:   Ms. Ham Cutler  has no past medical history on file.   Ms. Ham Cutler  has a past surgical history that includes Knee arthroscopy (Left); Tonsillectomy; Anterior cruciate ligament repair (Left); and Achilles tendon surgery (Left, 11/1/2022). Social History/Living Environment:   Type of Home: Apartment  Home Access: Stairs to enter with rails     Prior Level of Function/Work/Activity:   Prior level of function: Independent  Occupation: Student: College  Type of Occupation: [employer],  at [employer]:   Does the [address] have any barriers to learning?: No barriers  Will there be a co-learner?: No  What is the preferred language of the patient/guardian?: English  Is an  required?: No  How does the patient/guardian prefer to learn new concepts?: Listening; Demonstration; Pictures/Videos     Fall Risk Scale: Vernon Total Score: 20  Vernon Fall Risk: Low (0-24)           OBJECTIVE   Observation  Incision: healed    Girth measurements (R/L in cm)  Figure 8  50.5/51    ROM (R/L in °) AROM(PROM)  Ankle DF   14/-5  CKC ankle DF  NT/NT  Ankle PF   55/25  Ankle eversion  30/25  Ankle inversion  10/10      Strength (R/L)   Ankle DF   5/NT  Ankle PF   5/NT  Ankle eversion  5/NT  Ankle inversion  5 /NT    Palpation  No warmth, no pitting edema    ASSESSMENT   Initial Assessment:  Patient presents to physical therapy 10 weeks s/p L Achilles tendon repair. They present today with increased pain, increased swelling, decreased ROM and decreased strength. These impairments are causing difficulty with the following tasks: walking, squatting, stairs, and all ADLs related to a 25year old . Patient will benefit from skilled physical therapy to address their above impairments and functional limitations to assist with returning to [facility]. Problem List: (Impacting functional limitations): Body Structures, Functions, Activity Limitations Requiring Skilled Therapeutic Intervention: Decreased functional mobility ; Decreased ADL status; Decreased ROM; Decreased body mechanics; Decreased balance;  Increased pain; Decreased strength     Therapy Prognosis:   Therapy Prognosis: Excellent     Initial Assessment Complexity:   Decision Making: Low Complexity    PLAN   Effective Dates: 1/12/23  TO Plan of Care/Certification Expiration Date: 05/12/23   Frequency/Duration: Plan Frequency: 2-3x per week   Interventions Planned (Treatment may consist of any combination of the following):    Current Treatment Recommendations: Strengthening; ROM; Balance training; Functional mobility training; ADL/Self-care training; Gait training; Stair training; Neuromuscular re-education; Manual; Home exercise program; Modalities; Therapeutic activities     Goals: (Goals have been discussed and agreed upon with patient.)  Short-Term Functional Goals: Time Frame: 2 weeks  Patient will be independent with HEP. Patient will decrease pain to < 2/10. Patient will decrease swelling to be symmetrical to contralateral side. Discharge Goals: Time Frame: 16 weeks  Patient will decrease ankle pain to 0/10 in order to complete full day of classes. Patient will increase DF ROM to at least 15 in order to improve walking mechanics  Patient will increase ankle PF strength to 5/5 to improve ability to jump on L LE. Outcome Measure: Tool Used: FOOT AND ANKLE ABILITY MEASURE  Score:  Initial: 55 Most Recent: X (Date: -- )     Tool Used: FAAM - Sport subscale  Score:  Initial: 0 Most Recent: X (Date: -- )     Interpretation of Score: For the \"Activities of Daily Living\", there are 21 questions each scored on a 5 point scale with 0 representing \"Unable to do\" and 4 representing \"No difficulty\". The lower the score, the greater the functional disability. 84/84 represents no disability. Minimal detectable change is 5.7 points. With the addition of the 8 questions in the \"Sports Subscale,\" there are 29 questions, each scored on a 5 point scale with 0 representing \"Unable to do\" and 4 representing \"No difficulty\".   The lower the score, the greater the functional disability. 116/116 represents no disability. Minimal detectable change is 12.3 points. Medical Necessity:   > Patient demonstrates excellent rehab potential due to higher previous functional level. Reason For Services/Other Comments:  > Patient continues to require skilled intervention due to above stated impairments and functional limitations. Total Duration:  Time In: 0845  Time Out: 451 [address] therapy, I certify that the treatment plan above will be carried out by a therapist or under their direction.   Thank you for this referral,  Monet Canales, PT     Referring Physician Signature: Ceci Coughlin MD _______________________________ Date _____________        Post Session Pain  Charge Capture  PT Visit Info MD Guidelines  MyChart

## 2023-01-13 ENCOUNTER — HOSPITAL ENCOUNTER (OUTPATIENT)
Dept: PHYSICAL THERAPY | Age: 23
Setting detail: RECURRING SERIES
Discharge: HOME | End: 2023-01-16
Payer: COMMERCIAL

## 2023-01-13 PROCEDURE — 97016 VASOPNEUMATIC DEVICE THERAPY: CPT

## 2023-01-13 PROCEDURE — 97110 THERAPEUTIC EXERCISES: CPT

## 2023-01-13 NOTE — PROGRESS NOTES
Gale Simon  : 2000  Primary: Berna Hollis [facility])  Secondary: Seun Alvarado @ [address] [facility] [address]  Phone: [phone]  Fax: [phone] Plan Frequency: 2-3x per week    Plan of Care/Certification Expiration Date: 23      PT Visit Info:  Plan Frequency: 2-3x per week  Plan of Care/Certification Expiration Date: 23      Visit Count:  2    OUTPATIENT PHYSICAL THERAPY:OP NOTE TYPE: Treatment Note 2023       Episode  }Appt Desk             Treatment Diagnosis:  Difficulty in walking, Not elsewhere classified (R26.2)  Other abnormalities of gait and mobility (R26.89)  Medical/Referring Diagnosis:  Rupture of left Achilles tendon, subsequent encounter [S86.012D]  Acute post-operative pain [G89.18]  Referring Physician:  Pricilla Dillon MD MD Orders:  PT Eval and Treat   Date of Onset:  Onset Date: 22     Allergies:   Patient has no known allergies. Restrictions/Precautions:  Restrictions/Precautions: Surgical Protocols  No data recorded   Interventions Planned (Treatment may consist of any combination of the following):    Current Treatment Recommendations: Strengthening; ROM; Balance training; Functional mobility training; ADL/Self-care training; Gait training; Stair training; Neuromuscular re-education; Manual; Home exercise program; Modalities; Therapeutic activities     Subjective Comments: Feels comfortable with HEP. Initial:}     0/10Post Session:        0/10  Medications Last Reviewed:  2023  Updated Objective Findings:  See evaluation note from today  Treatment   TREATMENT:   THERAPEUTIC ACTIVITY: ( see below for minutes): Therapeutic activities per grid below to improve mobility, strength, balance and coordination. Required minimal visual, verbal, manual and tactile cues to improve independence and safety with daily activities .   THERAPEUTIC EXERCISE: (see below for minutes): Exercises per grid below to improve mobility, strength, balance and coordination. Required minimal verbal and manual cues to promote proper body alignment, promote proper body posture and promote proper body mechanics. Progressed resistance, range, repetitions and complexity of movement as indicated. MANUAL THERAPY: (see below for minutes): Joint mobilization and Soft tissue mobilization was utilized and necessary because of the patient's restricted joint motion, painful spasm, loss of articular motion and restricted motion of soft tissue. MODALITIES: (see below for minutes): to decrease pain   SELF CARE: (see below for minutes): Procedure(s) (per grid) utilized to improve and/or restore self-care/home management as related to dressing, bathing and grooming. Required minimal verbal cueing to facilitate activities of daily living skills and compensatory activities    Date: 1/12/23 1/13/23       Modalities:  10 min      Gameready 10 min 10 min                      Therapeutic Exercise: 20 min 40 min        HEP/precaution review Ankle AROM   DF/PF 3x10  Circles CW/CCW  3x10       Ankle PF AROM  X20 knee extended  X20 knee flexed 4 way ankle  3x10 ea  red       Toe curls  3x10 Fwd walking  3 laps       Ankle INV/EVR   2x10   red Tandem balance EC  3x30\"       Weight shifting in walking stance Soleus raise  3x10        Toe yoga  x20      Proprioceptive Activities:                                Manual Therapy:  5 min        Incision mobility  5 min              Functional Activities:                                            Treatment/Session Summary:    Treatment Assessment: VC throughout session for motor control during AROM and TB exercises. Achieved 2 ° DF ROM following AROM activities. Communication/Consultation:  None today  Equipment provided today:  None  Recommendations/Intent for next treatment session: Next visit will focus on ROM, strengthening and neuromuscular re-education per Achilles repair protocol.     Total Treatment Billable Duration:  55 minutes  Time In: 0845  Time Out: 0940    VALENTIN OSWALD, PT       Charge Capture  }Post Session Pain  PT Visit Info  MedBridge Portal  MD Guidelines  Scanned Media  Benefits  MyChart    Future Appointments   Date Time Provider Ruslan Jessy   1/16/2023  8:45 AM Onnie Sours, PT [facility]   1/18/2023  2:00 PM Onnie Sours, PT [facility]   1/20/2023  8:45 AM Onnie Sours, PT [facility]   1/23/2023  8:45 AM Vivian Washington, PT [facility]   1/25/2023  8:45 AM Onnie Sours, PT [facility]   1/27/2023  8:45 AM Onnie Sours, PT [facility]   1/30/2023  8:45 AM Onnie Sours, PT [facility]   2/1/2023  8:45 AM Onnie Sours, PT [facility]   2/3/2023  8:45 AM Onnie Sours, PT [facility]   2/6/2023  8:45 AM Onnie Sours, PT [facility]   2/8/2023  8:45 AM Onnie Sours, PT [facility]   2/10/2023  8:45 AM Onnie Sours, PT [facility]   2/13/2023  8:45 AM Onnie Sours, PT [facility]   2/15/2023  2:00 PM Onnie Sours, PT [facility]   2/17/2023  8:45 AM Onnie Sours, PT [facility]   2/20/2023  8:45 AM Onnie Sours, PT [facility]   2/22/2023  8:45 AM Onnie Sours, PT [facility]   2/22/2023  3:00 PM Suma Sanchez III, MD [facility]   2/24/2023  8:45 AM Onnie Sours, PT [facility]   2/27/2023  8:45 AM Onnie Sours, PT [facility]   3/1/2023  8:45 AM Onnie Marco, PT [facility]   3/3/2023  8:45 AM Onnie Marco, PT [facility]

## 2023-01-16 ENCOUNTER — HOSPITAL ENCOUNTER (OUTPATIENT)
Dept: PHYSICAL THERAPY | Age: 23
Setting detail: RECURRING SERIES
Discharge: HOME | End: 2023-01-19
Payer: COMMERCIAL

## 2023-01-16 PROCEDURE — 97110 THERAPEUTIC EXERCISES: CPT

## 2023-01-16 PROCEDURE — 97140 MANUAL THERAPY 1/> REGIONS: CPT

## 2023-01-16 PROCEDURE — 97016 VASOPNEUMATIC DEVICE THERAPY: CPT

## 2023-01-16 NOTE — PROGRESS NOTES
Erica Traorerula  : 2000  Primary: Janett Hollis [facility])  Secondary: Seun Alvarado @ [address] [facility] [address]  Phone: [phone]  Fax: [phone] Plan Frequency: 2-3x per week    Plan of Care/Certification Expiration Date: 23      PT Visit Info:  Plan Frequency: 2-3x per week  Plan of Care/Certification Expiration Date: 23      Visit Count:  3    OUTPATIENT PHYSICAL THERAPY:OP NOTE TYPE: Treatment Note 2023       Episode  }Appt Desk             Treatment Diagnosis:  Difficulty in walking, Not elsewhere classified (R26.2)  Other abnormalities of gait and mobility (R26.89)  Medical/Referring Diagnosis:  Rupture of left Achilles tendon, subsequent encounter [S86.012D]  Acute post-operative pain [G89.18]  Referring Physician:  Lyubov Kumar MD MD Orders:  PT Eval and Treat   Date of Onset:  Onset Date: 22     Allergies:   Patient has no known allergies. Restrictions/Precautions:  Restrictions/Precautions: Surgical Protocols  No data recorded   Interventions Planned (Treatment may consist of any combination of the following):    Current Treatment Recommendations: Strengthening; ROM; Balance training; Functional mobility training; ADL/Self-care training; Gait training; Stair training; Neuromuscular re-education; Manual; Home exercise program; Modalities; Therapeutic activities     Subjective Comments: Min calf soreness after last session. Initial:}     0/10Post Session:        0/10  Medications Last Reviewed:  2023  Updated Objective Findings:  See evaluation note from today  Treatment   TREATMENT:   THERAPEUTIC ACTIVITY: ( see below for minutes): Therapeutic activities per grid below to improve mobility, strength, balance and coordination. Required minimal visual, verbal, manual and tactile cues to improve independence and safety with daily activities .   THERAPEUTIC EXERCISE: (see below for minutes): Exercises per grid below to improve mobility, strength, balance and coordination. Required minimal verbal and manual cues to promote proper body alignment, promote proper body posture and promote proper body mechanics. Progressed resistance, range, repetitions and complexity of movement as indicated. MANUAL THERAPY: (see below for minutes): Joint mobilization and Soft tissue mobilization was utilized and necessary because of the patient's restricted joint motion, painful spasm, loss of articular motion and restricted motion of soft tissue. MODALITIES: (see below for minutes): to decrease pain   SELF CARE: (see below for minutes): Procedure(s) (per grid) utilized to improve and/or restore self-care/home management as related to dressing, bathing and grooming.  Required minimal verbal cueing to facilitate activities of daily living skills and compensatory activities    Date: 1/12/23 1/13/23 1/16/23      Modalities:  10 min 10 min     Gameready 10 min 10 min 10 min                     Therapeutic Exercise: 20 min 40 min  35 min      HEP/precaution review Ankle AROM   DF/PF 3x10  Circles CW/CCW  3x10 Ankle AROM   DF/PF 3x10  Circles CW/CCW  3x15      Ankle PF AROM  X20 knee extended  X20 knee flexed 4 way ankle  3x10 ea  red Seated rockerboard taps  3x10 AP and ML      Toe curls  3x10 Fwd walking  3 laps Seated dynadisc  3x10 PF/DF      Ankle INV/EVR   2x10   red Tandem balance EC  3x30\" 4 way ankle  3x10 ea  grn      Weight shifting in walking stance Soleus raise  3x10 Seated calf raise  3x10       Toe yoga  x20 Seated toe press to floor  3x10  red        SL stance balance  3x30\"        Mini squats  3x10                     Proprioceptive Activities:                                Manual Therapy:  5 min 10 min       Incision mobility  5 min Incision mobility  10 min             Functional Activities:                                            Treatment/Session Summary:    Treatment Assessment: Improved ankle motor control today with light strengthening; attained 7 ° AROM DF at end of session. Communication/Consultation:  None today  Equipment provided today:  None  Recommendations/Intent for next treatment session: Next visit will focus on ROM, strengthening and neuromuscular re-education per Achilles repair protocol.     Total Treatment Billable Duration:  55 minutes  Time In: 0845  Time Out: 0945    VALENTIN OSWALD, PT       Charge Capture  }Post Session Pain  PT Visit Info  MedBridge Portal  MD Guidelines  Scanned Media  Benefits  MyChart    Future Appointments   Date Time Provider Ruslan Jiménez   1/18/2023  2:00 PM Silvia Covarrubias, PT [facility]   1/20/2023  8:45 AM Wayshavonne Covarrubias, PT [facility]   1/23/2023  8:45 AM Collin Collado, PT [facility]   1/25/2023  8:45 AM Waynetta Marci, PT [facility]   1/27/2023  8:45 AM Waynetta Marci, PT [facility]   1/30/2023  8:45 AM Waynetta Marci, PT [facility]   2/1/2023  8:45 AM Waynetta Marci, PT [facility]   2/3/2023  8:45 AM Waynetta Marci, PT [facility]   2/6/2023  8:45 AM Waynetta Marci, PT [facility]   2/8/2023  8:45 AM Waynetta Marci, PT [facility]   2/10/2023  8:45 AM Stephennetta Marci, PT [facility]   2/13/2023  8:45 AM Waynetta Marci, PT [facility]   2/15/2023  2:00 PM Stephennetta Marci, PT [facility]   2/17/2023  8:45 AM Stephennetta Marci, PT [facility]   2/20/2023  8:45 AM Waynetta Marci, PT [facility]   2/22/2023  8:45 AM Waynetta Marci, PT [facility]   2/22/2023  3:00 PM Michelle Dietz III, MD [facility]   2/24/2023  8:45 AM Waynetta Marci, PT [facility]   2/27/2023  8:45 AM Silvia Covarrubias, PT [facility]   3/1/2023  8:45 AM Silvia Covarrubias, PT [facility]   3/3/2023  8:45 AM Silvia Covarrubias, PT [facility]

## 2023-01-18 ENCOUNTER — HOSPITAL ENCOUNTER (OUTPATIENT)
Dept: PHYSICAL THERAPY | Age: 23
Setting detail: RECURRING SERIES
Discharge: HOME | End: 2023-01-21
Payer: COMMERCIAL

## 2023-01-18 PROCEDURE — 97110 THERAPEUTIC EXERCISES: CPT

## 2023-01-18 PROCEDURE — 97140 MANUAL THERAPY 1/> REGIONS: CPT

## 2023-01-18 PROCEDURE — 97016 VASOPNEUMATIC DEVICE THERAPY: CPT

## 2023-01-18 NOTE — PROGRESS NOTES
Mariana Jeff  : 2000  Primary: Juancarlos Hollis [facility])  Secondary: Seun Alvarado @ [address] [facility] [address]  Phone: [phone]  Fax: [phone] Plan Frequency: 2-3x per week    Plan of Care/Certification Expiration Date: 23      PT Visit Info:  Plan Frequency: 2-3x per week  Plan of Care/Certification Expiration Date: 23      Visit Count:  4    OUTPATIENT PHYSICAL THERAPY:OP NOTE TYPE: Treatment Note 2023       Episode  }Appt Desk             Treatment Diagnosis:  Difficulty in walking, Not elsewhere classified (R26.2)  Other abnormalities of gait and mobility (R26.89)  Medical/Referring Diagnosis:  Rupture of left Achilles tendon, subsequent encounter [S86.012D]  Acute post-operative pain [G89.18]  Referring Physician:  Bard Annabella MD MD Orders:  PT Eval and Treat   Date of Onset:  Onset Date: 22     Allergies:   Patient has no known allergies. Restrictions/Precautions:  Restrictions/Precautions: Surgical Protocols  No data recorded   Interventions Planned (Treatment may consist of any combination of the following):    Current Treatment Recommendations: Strengthening; ROM; Balance training; Functional mobility training; ADL/Self-care training; Gait training; Stair training; Neuromuscular re-education; Manual; Home exercise program; Modalities; Therapeutic activities     Subjective Comments: No calf soreness but feels ankle is \"tight. \"      Initial:}     0/10Post Session:        0/10  Medications Last Reviewed:  2023  Updated Objective Findings:  See evaluation note from today  Treatment   TREATMENT:   THERAPEUTIC ACTIVITY: ( see below for minutes): Therapeutic activities per grid below to improve mobility, strength, balance and coordination. Required minimal visual, verbal, manual and tactile cues to improve independence and safety with daily activities .   THERAPEUTIC EXERCISE: (see below for minutes): Exercises per grid below to improve mobility, strength, balance and coordination. Required minimal verbal and manual cues to promote proper body alignment, promote proper body posture and promote proper body mechanics. Progressed resistance, range, repetitions and complexity of movement as indicated. MANUAL THERAPY: (see below for minutes): Joint mobilization and Soft tissue mobilization was utilized and necessary because of the patient's restricted joint motion, painful spasm, loss of articular motion and restricted motion of soft tissue. MODALITIES: (see below for minutes): to decrease pain   SELF CARE: (see below for minutes): Procedure(s) (per grid) utilized to improve and/or restore self-care/home management as related to dressing, bathing and grooming. Required minimal verbal cueing to facilitate activities of daily living skills and compensatory activities    Date: 1/16/23 1/18/23     Modalities: 10 min 10 min    Gameready 10 min 10 min                Therapeutic Exercise: 35 min 35 min     Ankle AROM   DF/PF 3x10  Circles CW/CCW  3x15 Ankle circles  3x10 CW/CCW     Seated rockerboard taps  3x10 AP and ML 4 way ankle  3x10 ea  grn     Seated dynadisc  3x10 PF/DF Seated calf raise  3x10     4 way ankle  3x10 ea  grn Standing calf raise (~20%)  3x10     Seated calf raise  3x10 Sidestepping  3 laps     Seated toe press to floor  3x10  red SL stance balance  3x30\"      SL stance balance  3x30\" Mini squats  3x10      Mini squats  3x10                 Proprioceptive Activities:                        Manual Therapy: 10 min 10 min     Incision mobility  10 min Incision mobility  10 min          Functional Activities:                                  Treatment/Session Summary:    Treatment Assessment: Able to progress calf strengthening today into DL calf raises, with approximately 20% BW through L LE; fatigue without calf pain.       Communication/Consultation:  None today  Equipment provided today: None  Recommendations/Intent for next treatment session: Next visit will focus on ROM, strengthening and neuromuscular re-education per Achilles repair protocol.     Total Treatment Billable Duration:  55 minutes  Time In: 1400  Time Out: Cristina PT       Charge Capture  }Post Session Pain  PT Visit Info  MedBridge Portal  MD Guidelines  Scanned Media  Benefits  MyChart    Future Appointments   Date Time Provider Ruslan Jessy   1/20/2023  8:45 AM Acey Stapler, PT [facility]   1/23/2023  8:45 AM Landa San, PT [facility]   1/25/2023  8:45 AM Acey Stapler, PT [facility]   1/27/2023  8:45 AM Acey Stapler, PT [facility]   1/30/2023  8:45 AM Acey Stapler, PT [facility]   2/1/2023  8:45 AM Acey Stapler, PT [facility]   2/3/2023  8:45 AM Acey Stapler, PT [facility]   2/6/2023  8:45 AM Acey Stapler, PT [facility]   2/8/2023  8:45 AM Acey Stapler, PT [facility]   2/10/2023  8:45 AM Acey Stapler, PT [facility]   2/13/2023  8:45 AM Acey Stapler, PT [facility]   2/15/2023  2:00 PM Acey Stapler, PT [facility]   2/17/2023  8:45 AM Acey Stapler, PT [facility]   2/20/2023  8:45 AM Acey Stapler, PT [facility]   2/22/2023  8:45 AM Acey Stapler, PT [facility]   2/22/2023  3:00 PM Iam Ped III, MD [facility]   2/24/2023  8:45 AM Acey Stapler, PT [facility]   2/27/2023  8:45 AM Acey Stapler, PT [facility]   3/1/2023  8:45 AM Acey Stapler, PT [facility]   3/3/2023  8:45 AM Acey Stapler, PT [facility]

## 2023-01-20 ENCOUNTER — HOSPITAL ENCOUNTER (OUTPATIENT)
Dept: PHYSICAL THERAPY | Age: 23
Setting detail: RECURRING SERIES
Discharge: HOME | End: 2023-01-23
Payer: COMMERCIAL

## 2023-01-20 PROCEDURE — 97016 VASOPNEUMATIC DEVICE THERAPY: CPT

## 2023-01-20 PROCEDURE — 97110 THERAPEUTIC EXERCISES: CPT

## 2023-01-20 PROCEDURE — 97140 MANUAL THERAPY 1/> REGIONS: CPT

## 2023-01-20 NOTE — PROGRESS NOTES
Liss Alves  : 2000  Primary: [facility])  Secondary: Seun Alvarado @ [address] [facility] [address]  Phone: [phone]  Fax: [phone] Plan Frequency: 2-3x per week    Plan of Care/Certification Expiration Date: 23      PT Visit Info:  Plan Frequency: 2-3x per week  Plan of Care/Certification Expiration Date: 23      Visit Count:  5    OUTPATIENT PHYSICAL THERAPY:OP NOTE TYPE: Treatment Note 2023       Episode  }Appt Desk             Treatment Diagnosis:  Difficulty in walking, Not elsewhere classified (R26.2)  Other abnormalities of gait and mobility (R26.89)  Medical/Referring Diagnosis:  Rupture of left Achilles tendon, subsequent encounter [S86.012D]  Acute post-operative pain [G89.18]  Referring Physician:  Ramiro Kwong MD MD Orders:  PT Eval and Treat   Date of Onset:  Onset Date: 22     Allergies:   Patient has no known allergies. Restrictions/Precautions:  Restrictions/Precautions: Surgical Protocols  No data recorded   Interventions Planned (Treatment may consist of any combination of the following):    Current Treatment Recommendations: Strengthening; ROM; Balance training; Functional mobility training; ADL/Self-care training; Gait training; Stair training; Neuromuscular re-education; Manual; Home exercise program; Modalities; Therapeutic activities     Subjective Comments: Min calf soreness for about a day after last visit, has had more heel pain the past couple days. Initial:}     0/10Post Session:        0/10  Medications Last Reviewed:  2023  Updated Objective Findings:  See evaluation note from today  Treatment   TREATMENT:   THERAPEUTIC ACTIVITY: ( see below for minutes): Therapeutic activities per grid below to improve mobility, strength, balance and coordination.  Required minimal visual, verbal, manual and tactile cues to improve independence and safety with daily activities Corey Javad THERAPEUTIC EXERCISE: (see below for minutes): Exercises per grid below to improve mobility, strength, balance and coordination. Required minimal verbal and manual cues to promote proper body alignment, promote proper body posture and promote proper body mechanics. Progressed resistance, range, repetitions and complexity of movement as indicated. MANUAL THERAPY: (see below for minutes): Joint mobilization and Soft tissue mobilization was utilized and necessary because of the patient's restricted joint motion, painful spasm, loss of articular motion and restricted motion of soft tissue. MODALITIES: (see below for minutes): to decrease pain   SELF CARE: (see below for minutes): Procedure(s) (per grid) utilized to improve and/or restore self-care/home management as related to dressing, bathing and grooming.  Required minimal verbal cueing to facilitate activities of daily living skills and compensatory activities    Date: 1/16/23 1/18/23 1/20/23    Modalities: 10 min 10 min 10 min   Gameready 10 min 10 min 10 min               Therapeutic Exercise: 35 min 35 min 35 min     Ankle AROM   DF/PF 3x10  Circles CW/CCW  3x15 Ankle circles  3x10 CW/CCW Ankle circles  3x10 CW/CCW    Seated rockerboard taps  3x10 AP and ML 4 way ankle  3x10 ea  grn Ankle INV/EVR/DF  3x10 ea  blue    Seated dynadisc  3x10 PF/DF Seated calf raise  3x10 Seated calf raise  3x10  10#    4 way ankle  3x10 ea  grn Standing calf raise (~20%)  3x10 Sidesteps  3 laps  Grn at knees    Seated calf raise  3x10 Sidestepping  3 laps Standing calf raise (~40% BW)  3x10    Seated toe press to floor  3x10  red SL stance balance  3x30\"  SL stance  3x30\"    SL stance balance  3x30\" Mini squats  3x10  Calf raise w/ weight shift  2x5    Mini squats  3x10  Step ups  3x10  6\" box               Proprioceptive Activities:                        Manual Therapy: 10 min 10 min 10 min    Incision mobility  10 min Incision mobility  10 min Incision mobility  5 min STM plantar fascia  5 min    Functional Activities:                                  Treatment/Session Summary:    Treatment Assessment: Continues to progress as expected with strengthening phase of rehab, demonstrating improved confidence with therex. Communication/Consultation:  None today  Equipment provided today:  None  Recommendations/Intent for next treatment session: Next visit will focus on ROM, strengthening and neuromuscular re-education per Achilles repair protocol.     Total Treatment Billable Duration:  55 minutes  Time In: 0845  Time Out: 0945    VALENTIN OSWALD, PT       Charge Capture  }Post Session Pain  PT Visit Info  MedBridge Portal  MD Guidelines  Scanned Media  Benefits  MyChart    Future Appointments   Date Time Provider Ruslan Jiménez   1/23/2023  8:45 AM Curlie Bearded, PT [facility]   1/25/2023  8:45 AM Wenda Hilton, PT [facility]   1/27/2023  8:45 AM Wenda Hilton, PT [facility]   1/30/2023  8:45 AM Wenda Hilton, PT [facility]   2/1/2023  8:45 AM Wenda Hilton, PT [facility]   2/3/2023  8:45 AM Wenda Hilton, PT [facility]   2/6/2023  9:30 AM Curlie Bearded, PT [facility]   2/8/2023  8:45 AM Wenda Hilton, PT [facility]   2/10/2023  8:45 AM Wenda Hilton, PT [facility]   2/13/2023  8:45 AM Wenda Hilton, PT [facility]   2/15/2023  2:00 PM Wenda Hilton, PT [facility]   2/17/2023  8:45 AM Wenda Hilton, PT [facility]   2/20/2023  8:45 AM Wenda Hilton, PT [facility]   2/22/2023  8:45 AM Wenda Hilton, PT [facility]   2/22/2023  3:00 PM Ella Jimenez III, MD [facility]   2/24/2023  8:45 AM Wenda Hilton, PT [facility]   2/27/2023  8:45 AM Wenda Hilton, PT [facility]   3/1/2023  8:45 AM Wenda Hilton, PT [facility]   3/3/2023  8:45 AM Wenda Hilton, PT [facility]

## 2023-01-23 ENCOUNTER — HOSPITAL ENCOUNTER (OUTPATIENT)
Dept: PHYSICAL THERAPY | Age: 23
Setting detail: RECURRING SERIES
Discharge: HOME | End: 2023-01-26
Payer: COMMERCIAL

## 2023-01-23 PROCEDURE — 97140 MANUAL THERAPY 1/> REGIONS: CPT

## 2023-01-23 PROCEDURE — 97110 THERAPEUTIC EXERCISES: CPT

## 2023-01-23 PROCEDURE — 97016 VASOPNEUMATIC DEVICE THERAPY: CPT

## 2023-01-23 NOTE — PROGRESS NOTES
Sherlyn Beard  : 2000  Primary: Darrell [facility])  Secondary: Seun Alvarado @ [address] [facility] [address]  Phone: [phone]  Fax: [phone] Plan Frequency: 2-3x per week    Plan of Care/Certification Expiration Date: 23      PT Visit Info:  Plan Frequency: 2-3x per week  Plan of Care/Certification Expiration Date: 23      Visit Count:  6    OUTPATIENT PHYSICAL THERAPY:OP NOTE TYPE: Treatment Note 2023       Episode  }Appt Desk             Treatment Diagnosis:  Difficulty in walking, Not elsewhere classified (R26.2)  Other abnormalities of gait and mobility (R26.89)  Medical/Referring Diagnosis:  Rupture of left Achilles tendon, subsequent encounter [S86.012D]  Acute post-operative pain [G89.18]  Referring Physician:  Kevin Arnold MD MD Orders:  PT Eval and Treat   Date of Onset:  Onset Date: 22     Allergies:   Patient has no known allergies. Restrictions/Precautions:  Restrictions/Precautions: Surgical Protocols  No data recorded   Interventions Planned (Treatment may consist of any combination of the following):    Current Treatment Recommendations: Strengthening; ROM; Balance training; Functional mobility training; ADL/Self-care training; Gait training; Stair training; Neuromuscular re-education; Manual; Home exercise program; Modalities; Therapeutic activities     Subjective Comments: Foot pain completely alleviated with heel wedge over the weekend. Calf soreness about 4-5/10 after last session for a day. Initial:}     0/10Post Session:        0/10  Medications Last Reviewed:  2023  Updated Objective Findings:  See evaluation note from today  Treatment   TREATMENT:   THERAPEUTIC ACTIVITY: ( see below for minutes): Therapeutic activities per grid below to improve mobility, strength, balance and coordination.  Required minimal visual, verbal, manual and tactile cues to improve independence and safety with daily activities . THERAPEUTIC EXERCISE: (see below for minutes): Exercises per grid below to improve mobility, strength, balance and coordination. Required minimal verbal and manual cues to promote proper body alignment, promote proper body posture and promote proper body mechanics. Progressed resistance, range, repetitions and complexity of movement as indicated. MANUAL THERAPY: (see below for minutes): Joint mobilization and Soft tissue mobilization was utilized and necessary because of the patient's restricted joint motion, painful spasm, loss of articular motion and restricted motion of soft tissue. MODALITIES: (see below for minutes): to decrease pain   SELF CARE: (see below for minutes): Procedure(s) (per grid) utilized to improve and/or restore self-care/home management as related to dressing, bathing and grooming.  Required minimal verbal cueing to facilitate activities of daily living skills and compensatory activities    Date: 1/16/23 1/18/23 1/20/23 1/23/23    Modalities: 10 min 10 min 10 min 10 min   Gameready 10 min 10 min 10 min 10 min                 Therapeutic Exercise: 35 min 35 min 35 min  40 min    Ankle AROM   DF/PF 3x10  Circles CW/CCW  3x15 Ankle circles  3x10 CW/CCW Ankle circles  3x10 CW/CCW Ankle INV/EVR/DF  3x10 ea  blue    Seated rockerboard taps  3x10 AP and ML 4 way ankle  3x10 ea  grn Ankle INV/EVR/DF  3x10 ea  blue Seated calf raise  3x10  10#    Seated dynadisc  3x10 PF/DF Seated calf raise  3x10 Seated calf raise  3x10  10# Sidesteps  3 laps  Grn at knees    4 way ankle  3x10 ea  grn Standing calf raise (~20%)  3x10 Sidesteps  3 laps  Grn at knees Standing calf raise (~40% BW)  3x10    Seated calf raise  3x10 Sidestepping  3 laps Standing calf raise (~40% BW)  3x10 Rocker board holds  3x30\" ML  3x30\" AP    Seated toe press to floor  3x10  red SL stance balance  3x30\"  SL stance  3x30\" Staggered stance calf raise  25 B    SL stance balance  3x30\" Mini squats  3x10  Calf raise w/ weight shift  2x5 Step ups  3x10 B  6\" box    Mini squats  3x10  Step ups  3x10  6\" box Mini squats  3x10                 Proprioceptive Activities:                            Manual Therapy: 10 min 10 min 10 min 5 min    Incision mobility  10 min Incision mobility  10 min Incision mobility  5 min Incision mobility  5 min      STM plantar fascia  5 min     Functional Activities:                                       Treatment/Session Summary:    Treatment Assessment: Continued difficulty with standing there ex as appropriate; utilizing rocker board for balance and even weight shifting between LE. Communication/Consultation:  None today  Equipment provided today:  None  Recommendations/Intent for next treatment session: Next visit will focus on ROM, strengthening and neuromuscular re-education per Achilles repair protocol.     Total Treatment Billable Duration:  55 minutes  Time In: 0845  Time Out: 0945    VALENTIN OSWALD, PT       Charge Capture  }Post Session Pain  PT Visit Info  [employer] Portal  MD Guidelines  Scanned Media  Benefits  [employer]    Future Appointments   Date Time Provider Ruslan Jiménez   1/25/2023  8:45 AM Dakotah Miramontes, PT [facility]   1/27/2023  8:45 AM Dakotah Miramontes, PT [facility]   1/30/2023  8:45 AM Dakotah Miramontes, PT [facility]   2/1/2023  8:45 AM Dakotah Miramontes, PT [facility]   2/3/2023  8:45 AM Dakotah Miramontes, PT [facility]   2/6/2023  9:30 AM Claude Berry, PT [facility]   2/8/2023  8:45 AM Dakotah Miramontes, PT [facility]   2/10/2023  8:45 AM Dakotah Miramontes, PT [facility]   2/13/2023  8:45 AM Dakotah Miramontes, PT [facility]   2/15/2023  2:00 PM Dakotah Miramontes, PT [facility]   2/17/2023  8:45 AM Dakotah Miramontes, PT [facility]   2/20/2023  8:45 AM Dakotah Miramontes, PT [facility]   2/22/2023  8:45 AM Dakotah Miramontes, PT [facility]   2/22/2023  3:00 PM Tiago Whitehead III, MD [facility]   2/24/2023  8:45 AM Dakotah Miramontes, PT [facility]   2/27/2023  8:45 AM Alejandro Thomas Kodak PT [facility]   3/1/2023  8:45 AM Avril Díaz PT [facility]   3/3/2023  8:45 AM Avril Díaz PT [facility]

## 2023-01-25 ENCOUNTER — HOSPITAL ENCOUNTER (OUTPATIENT)
Dept: PHYSICAL THERAPY | Age: 23
Setting detail: RECURRING SERIES
Discharge: HOME | End: 2023-01-28
Payer: COMMERCIAL

## 2023-01-25 PROCEDURE — 97016 VASOPNEUMATIC DEVICE THERAPY: CPT

## 2023-01-25 PROCEDURE — 97110 THERAPEUTIC EXERCISES: CPT

## 2023-01-25 NOTE — PROGRESS NOTES
Obdulia Howard  : 2000  Primary: Shweta [facility])  Secondary: Seun Alvarado @ [address] [facility] [address]  Phone: [phone]  Fax: [phone] Plan Frequency: 2-3x per week    Plan of Care/Certification Expiration Date: 23      PT Visit Info:  Plan Frequency: 2-3x per week  Plan of Care/Certification Expiration Date: 23      Visit Count:  7    OUTPATIENT PHYSICAL THERAPY:OP NOTE TYPE: Treatment Note 2023       Episode  }Appt Desk             Treatment Diagnosis:  Difficulty in walking, Not elsewhere classified (R26.2)  Other abnormalities of gait and mobility (R26.89)  Medical/Referring Diagnosis:  Rupture of left Achilles tendon, subsequent encounter [S86.012D]  Acute post-operative pain [G89.18]  Referring Physician:  Molly Kelly MD MD Orders:  PT Eval and Treat   Date of Onset:  Onset Date: 22     Allergies:   Patient has no known allergies. Restrictions/Precautions:  Restrictions/Precautions: Surgical Protocols  No data recorded   Interventions Planned (Treatment may consist of any combination of the following):    Current Treatment Recommendations: Strengthening; ROM; Balance training; Functional mobility training; ADL/Self-care training; Gait training; Stair training; Neuromuscular re-education; Manual; Home exercise program; Modalities; Therapeutic activities     Subjective Comments: Min calf and plantar heel soreness after last visit. Reports still not feeling confident on foot. Initial:}     0/10Post Session:        0/10  Medications Last Reviewed:  2023  Updated Objective Findings:  See evaluation note from today  Treatment   TREATMENT:   THERAPEUTIC ACTIVITY: ( see below for minutes): Therapeutic activities per grid below to improve mobility, strength, balance and coordination.  Required minimal visual, verbal, manual and tactile cues to improve independence and safety with daily activities Sylvia Greenwood THERAPEUTIC EXERCISE: (see below for minutes): Exercises per grid below to improve mobility, strength, balance and coordination. Required minimal verbal and manual cues to promote proper body alignment, promote proper body posture and promote proper body mechanics. Progressed resistance, range, repetitions and complexity of movement as indicated. MANUAL THERAPY: (see below for minutes): Joint mobilization and Soft tissue mobilization was utilized and necessary because of the patient's restricted joint motion, painful spasm, loss of articular motion and restricted motion of soft tissue. MODALITIES: (see below for minutes): to decrease pain   SELF CARE: (see below for minutes): Procedure(s) (per grid) utilized to improve and/or restore self-care/home management as related to dressing, bathing and grooming.  Required minimal verbal cueing to facilitate activities of daily living skills and compensatory activities    Date: 1/20/23 1/23/23 1/25/23    Modalities: 10 min 10 min 15 min   Gameready 10 min 10 min 15 min               Therapeutic Exercise: 35 min  40 min 35 min    Ankle circles  3x10 CW/CCW Ankle INV/EVR/DF  3x10 ea  blue Ankle INV/EVR/DF  3x10 ea  blue    Ankle INV/EVR/DF  3x10 ea  blue Seated calf raise  3x10  10# Seated calf raise  3x10  15#    Seated calf raise  3x10  10# Sidesteps  3 laps  Grn at knees Sidesteps  3 laps  blue at knees    Sidesteps  3 laps  Grn at knees Standing calf raise (~40% BW)  3x10 Monster walks  3 laps  blue    Standing calf raise (~40% BW)  3x10 Rocker board holds  3x30\" ML  3x30\" AP Standing calf raise  (~40% BW)  3x10    SL stance  3x30\" Staggered stance calf raise  25 B Toe walking toward R, hands on // bars  2x3 laps    Calf raise w/ weight shift  2x5 Step ups  3x10 B  6\" box Rockerboard  3x30\" AP hold EO  3x30\" ML hold EC    Step ups  3x10  6\" box Mini squats  3x10 Lateral step up  3x10  6\" box      SL balance, heel elevated w/ ball toss  3x10          Proprioceptive Activities:                        Manual Therapy: 10 min 5 min 5 min    Incision mobility  5 min Incision mobility  5 min Incision mobility  5 min    STM plantar fascia  5 min      Functional Activities:                                  Treatment/Session Summary:    Treatment Assessment: Progressive calf strengthening as appropriate 12 weeks s/p surgery, fatigues with progressions. Continued emphasis of neuro-muscular re-education to assist with building patient's confidence of foot. Communication/Consultation:  None today  Equipment provided today:  None  Recommendations/Intent for next treatment session: Next visit will focus on ROM, strengthening and neuromuscular re-education per Achilles repair protocol.     Total Treatment Billable Duration:  55 minutes  Time In: 0845  Time Out: 0945    VALENTIN OSWALD, PT       Charge Capture  }Post Session Pain  PT Visit Info  MedBridge Portal  MD Guidelines  Scanned Media  Benefits  [employer]    Future Appointments   Date Time Provider Ruslan Jiménez   1/27/2023  8:45 AM Betancur Croon, PT [facility]   1/30/2023  8:45 AM Betancur Croon, PT [facility]   2/1/2023  8:45 AM Betancur Croon, PT [facility]   2/3/2023  8:45 AM Betancur Croon, PT [facility]   2/6/2023  9:30 AM Christiano Phillips, PT [facility]   2/8/2023  8:45 AM Betancur Croon, PT [facility]   2/10/2023  8:45 AM Betancur Croon, PT [facility]   2/13/2023  8:45 AM Betancur Croon, PT [facility]   2/15/2023  2:00 PM Betancur Croon, PT [facility]   2/17/2023  8:45 AM Betancur Croon, PT [facility]   2/20/2023  8:45 AM Betancur Croon, PT [facility]   2/22/2023  8:45 AM Betancur Croon, PT [facility]   2/22/2023  3:00 PM Keily Hunter III, MD [facility]   2/24/2023  8:45 AM Betancur Croon, PT [facility]   2/27/2023  8:45 AM Betancur Croon, PT [facility]   3/1/2023  8:45 AM Betancur Croon, PT [facility]   3/3/2023  8:45 AM [facility]

## 2023-01-27 ENCOUNTER — HOSPITAL ENCOUNTER (OUTPATIENT)
Dept: PHYSICAL THERAPY | Age: 23
Setting detail: RECURRING SERIES
Discharge: HOME | End: 2023-01-30
Payer: COMMERCIAL

## 2023-01-27 PROCEDURE — 97110 THERAPEUTIC EXERCISES: CPT

## 2023-01-27 PROCEDURE — 97016 VASOPNEUMATIC DEVICE THERAPY: CPT

## 2023-01-27 PROCEDURE — 97140 MANUAL THERAPY 1/> REGIONS: CPT

## 2023-01-27 NOTE — PROGRESS NOTES
Tierney Lees  : 2000  Primary: Neetu Ott Sc [facility])  Secondary: Seun Alvarado @ [address] [facility] [address]  Phone: [phone]  Fax: [phone] Plan Frequency: 2-3x per week    Plan of Care/Certification Expiration Date: 23      PT Visit Info:  Plan Frequency: 2-3x per week  Plan of Care/Certification Expiration Date: 23      Visit Count:  8    OUTPATIENT PHYSICAL THERAPY:OP NOTE TYPE: Treatment Note 2023       Episode  }Appt Desk             Treatment Diagnosis:  Difficulty in walking, Not elsewhere classified (R26.2)  Other abnormalities of gait and mobility (R26.89)  Medical/Referring Diagnosis:  Rupture of left Achilles tendon, subsequent encounter [S86.012D]  Acute post-operative pain [G89.18]  Referring Physician:  Dennie Rous, MD MD Orders:  PT Eval and Treat   Date of Onset:  Onset Date: 22     Allergies:   Patient has no known allergies. Restrictions/Precautions:  Restrictions/Precautions: Surgical Protocols  No data recorded   Interventions Planned (Treatment may consist of any combination of the following):    Current Treatment Recommendations: Strengthening; ROM; Balance training; Functional mobility training; ADL/Self-care training; Gait training; Stair training; Neuromuscular re-education; Manual; Home exercise program; Modalities; Therapeutic activities     Subjective Comments: Had a little more calf soreness after last visit but did not last into the following day. Initial:}     0/10Post Session:        0/10  Medications Last Reviewed:  2023  Updated Objective Findings:  See evaluation note from today  Treatment   TREATMENT:   THERAPEUTIC ACTIVITY: ( see below for minutes): Therapeutic activities per grid below to improve mobility, strength, balance and coordination. Required minimal visual, verbal, manual and tactile cues to improve independence and safety with daily activities .   THERAPEUTIC EXERCISE: (see below for minutes): Exercises per grid below to improve mobility, strength, balance and coordination. Required minimal verbal and manual cues to promote proper body alignment, promote proper body posture and promote proper body mechanics. Progressed resistance, range, repetitions and complexity of movement as indicated. MANUAL THERAPY: (see below for minutes): Joint mobilization and Soft tissue mobilization was utilized and necessary because of the patient's restricted joint motion, painful spasm, loss of articular motion and restricted motion of soft tissue. MODALITIES: (see below for minutes): to decrease pain   SELF CARE: (see below for minutes): Procedure(s) (per grid) utilized to improve and/or restore self-care/home management as related to dressing, bathing and grooming.  Required minimal verbal cueing to facilitate activities of daily living skills and compensatory activities    Date: 1/20/23 1/23/23 1/25/23 1/27/23    Modalities: 10 min 10 min 15 min 15 min   Gameready 10 min 10 min 15 min 15 min                 Therapeutic Exercise: 35 min  40 min 35 min 35 min    Ankle circles  3x10 CW/CCW Ankle INV/EVR/DF  3x10 ea  blue Ankle INV/EVR/DF  3x10 ea  blue Ankle INV/EVR/DF  3x10 ea  blk    Ankle INV/EVR/DF  3x10 ea  blue Seated calf raise  3x10  10# Seated calf raise  3x10  15# Seated calf raise  3x10  20#    Seated calf raise  3x10  10# Sidesteps  3 laps  Grn at knees Sidesteps  3 laps  blue at knees Sidesteps  3 laps  blue at knees    Sidesteps  3 laps  Grn at knees Standing calf raise (~40% BW)  3x10 Monster walks  3 laps  blue Monster walks  3 laps  blue    Standing calf raise (~40% BW)  3x10 Rocker board holds  3x30\" ML  3x30\" AP Standing calf raise  (~40% BW)  3x10 Standing calf raise  2x10 BW  1x10 10#    SL stance  3x30\" Staggered stance calf raise  25 B Toe walking toward R, hands on // bars  2x3 laps Fwd toe walk in // bars  2x3 laps    Calf raise w/ weight shift  2x5 Step ups  3x10 B  6\" box Rockerboard  3x30\" AP hold EO  3x30\" ML hold EC Staggered stance calf raise  3x10 B    Step ups  3x10  6\" box Mini squats  3x10 Lateral step up  3x10  6\" box MOBO holds  3x30\"      SL balance, heel elevated w/ ball toss  3x10  Rockerboard  3x30\" AP hold EO  3x30\" ML hold EC          Proprioceptive Activities:                            Manual Therapy: 10 min 5 min 5 min 10 min    Incision mobility  5 min Incision mobility  5 min Incision mobility  5 min Incision mobility  5 min    STM plantar fascia  5 min    STM plantar fascia  5 min    Functional Activities:                                       Treatment/Session Summary:    Treatment Assessment: Utilizing fwd toe walking and staggered stance calf raises today to increase effort of L LE; continues to compensate with R LE with DL activities. Communication/Consultation:  None today  Equipment provided today:  None  Recommendations/Intent for next treatment session: Next visit will focus on ROM, strengthening and neuromuscular re-education per Achilles repair protocol.     Total Treatment Billable Duration: 60 minutes  Time In: 0845  Time Out: 1358    Esau Young PT       Charge Capture  }Post Session Pain  PT Visit Info  MedBridge Portal  MD Guidelines  Scanned Media  Benefits  MyChart    Future Appointments   Date Time Provider Ruslan Jiménez   1/30/2023  8:45 AM Colan [place], PT [facility]   2/1/2023  8:45 AM Colan [place], PT [facility]   2/3/2023  8:45 AM Colan [place], PT [facility]   2/6/2023  9:30 AM Janelle Paling, PT [facility]   2/8/2023  8:45 AM Colan [place], PT [facility]   2/10/2023  8:45 AM Colan [place], PT [facility]   2/13/2023  8:45 AM Colan [place], PT [facility]   2/15/2023  2:00 PM Colan [place], PT [facility]   2/17/2023  8:45 AM Colan [place], PT [facility]   2/20/2023  8:45 AM Colan [place], PT [facility]   2/22/2023  8:45 AM Colan [place], PT [facility]   2/22/2023  3:00 PM Elsi Cervantes Danuta MORRIS MD [facility]   2/24/2023  8:45 AM Eve Cargo, PT [facility]   2/27/2023  8:45 AM Eve Cargo, PT [facility]   3/1/2023  8:45 AM Eve Cargo, PT [facility]   3/3/2023  8:45 AM Eve Cargo, PT [facility]   3/6/2023  9:30 AM Eve Cargo, PT [facility]   3/8/2023  8:45 AM Eve Cargo, PT [facility]   3/10/2023  8:45 AM Eve Cargo, PT [facility]   3/13/2023  8:45 AM Eve Cargo, PT [facility]   3/16/2023  8:45 AM Eve Cargo, PT [facility]   3/17/2023  8:45 AM Eve Cargo, PT [facility]   3/20/2023  8:45 AM Eve Cargo, PT [facility]   3/22/2023  8:45 AM Eve Cargo, PT [facility]   3/24/2023  8:45 AM Eve Cargo, PT [facility]   3/27/2023  8:45 AM Eve Cargo, PT [facility]   3/29/2023  8:45 AM Eve Cargo, PT [facility]   3/31/2023  8:45 AM Eve Cargo, PT [facility]

## 2023-01-30 ENCOUNTER — HOSPITAL ENCOUNTER (OUTPATIENT)
Dept: PHYSICAL THERAPY | Age: 23
Setting detail: RECURRING SERIES
Discharge: HOME | End: 2023-02-02
Payer: COMMERCIAL

## 2023-01-30 PROCEDURE — 97016 VASOPNEUMATIC DEVICE THERAPY: CPT

## 2023-01-30 PROCEDURE — 97110 THERAPEUTIC EXERCISES: CPT

## 2023-01-30 NOTE — PROGRESS NOTES
Susannah Frederick  : 2000  Primary: Donaciano Seip Sc [facility])  Secondary: Seun Alvarado @ [address] [facility] [address]  Phone: [phone]  Fax: [phone] Plan Frequency: 2-3x per week    Plan of Care/Certification Expiration Date: 23      PT Visit Info:  Plan Frequency: 2-3x per week  Plan of Care/Certification Expiration Date: 23      Visit Count:  9    OUTPATIENT PHYSICAL THERAPY:OP NOTE TYPE: Treatment Note 2023       Episode  }Appt Desk             Treatment Diagnosis:  Difficulty in walking, Not elsewhere classified (R26.2)  Other abnormalities of gait and mobility (R26.89)  Medical/Referring Diagnosis:  Rupture of left Achilles tendon, subsequent encounter [S86.012D]  Acute post-operative pain [G89.18]  Referring Physician:  Corinna Pacheco MD MD Orders:  PT Eval and Treat   Date of Onset:  Onset Date: 22     Allergies:   Patient has no known allergies. Restrictions/Precautions:  Restrictions/Precautions: Surgical Protocols  No data recorded   Interventions Planned (Treatment may consist of any combination of the following):    Current Treatment Recommendations: Strengthening; ROM; Balance training; Functional mobility training; ADL/Self-care training; Gait training; Stair training; Neuromuscular re-education; Manual; Home exercise program; Modalities; Therapeutic activities     Subjective Comments: Min soreness at Achilles after last visit, still feels stiff in the mornings. Initial:}     0/10Post Session:        0/10  Medications Last Reviewed:  2023  Updated Objective Findings:  See evaluation note from today  Treatment   TREATMENT:   THERAPEUTIC ACTIVITY: ( see below for minutes): Therapeutic activities per grid below to improve mobility, strength, balance and coordination. Required minimal visual, verbal, manual and tactile cues to improve independence and safety with daily activities .   THERAPEUTIC EXERCISE: (see below for minutes): Exercises per grid below to improve mobility, strength, balance and coordination. Required minimal verbal and manual cues to promote proper body alignment, promote proper body posture and promote proper body mechanics. Progressed resistance, range, repetitions and complexity of movement as indicated. MANUAL THERAPY: (see below for minutes): Joint mobilization and Soft tissue mobilization was utilized and necessary because of the patient's restricted joint motion, painful spasm, loss of articular motion and restricted motion of soft tissue. MODALITIES: (see below for minutes): to decrease pain   SELF CARE: (see below for minutes): Procedure(s) (per grid) utilized to improve and/or restore self-care/home management as related to dressing, bathing and grooming.  Required minimal verbal cueing to facilitate activities of daily living skills and compensatory activities    Date: 1/25/23 1/27/23 1/30/23    Modalities: 15 min 15 min 15 min   Gameready 15 min 15 min 15 min               Therapeutic Exercise: 35 min 35 min 40 min    Ankle INV/EVR/DF  3x10 ea  blue Ankle INV/EVR/DF  3x10 ea  blk Ankle INV/EVR/DF  3x10 ea  blk    Seated calf raise  3x10  15# Seated calf raise  3x10  20# Seated calf raise  3x10  20#    Sidesteps  3 laps  blue at knees Sidesteps  3 laps  blue at knees Sidesteps  3 laps  Black  at [employer] walks  3 laps  blue Monster walks  3 laps  blue Monster walks  3 laps  black    Standing calf raise  (~40% BW)  3x10 Standing calf raise  2x10 BW  1x10 10# Toe walks fwd  3 laps in // bars     Toe walking toward R, hands on // bars  2x3 laps Fwd toe walk in // bars  2x3 laps Lateral step downs  3x10   4\" box    Rockerboard  3x30\" AP hold EO  3x30\" ML hold EC Staggered stance calf raise  3x10 B Lateral toe walks  3 laps in // bars    Lateral step up  3x10  6\" box MOBO holds  3x30\" SL reach at plinth  3x10 L    SL balance, heel elevated w/ ball toss  3x10  Rockerboard  3x30\" AP hold EO  3x30\" ML hold EC Rockerboard  3x30\" AP hold EO  3x30\" ML hold EC         Proprioceptive Activities:                        Manual Therapy: 5 min 10 min 5 min    Incision mobility  5 min Incision mobility  5 min Incision mobility  5 min     STM plantar fascia  5 min     Functional Activities:                                  Treatment/Session Summary:    Treatment Assessment: Continues to need use of hands for SL heel raise; however able to complete toe walking with decreased use of hands suggesting increased calf strength. Communication/Consultation:  None today  Equipment provided today:  None  Recommendations/Intent for next treatment session: Next visit will focus on ROM, strengthening and neuromuscular re-education per Achilles repair protocol.     Total Treatment Billable Duration: 60 minutes  Time In: 0845  Time Out: 2022    Debora Mckeon PT       Charge Capture  }Post Session Pain  PT Visit Info  MedBridge Portal  MD Guidelines  Scanned Media  Benefits  MyChart    Future Appointments   Date Time Provider Ruslan Jiménez   [facility]2023  [facility]   2/3/2023  8:45 AM Titi Hy, PT [facility]   2/6/2023  9:30 AM Hermedilberto Olmsteady, PT [facility]   2/8/2023  8:45 AM Titi Hy, PT [facility]   2/10/2023  8:45 AM Titi Hy, PT [facility]   2/13/2023  8:45 AM Titi Hy, PT [facility]   2/15/2023  2:00 PM Titi Hy, PT [facility]   2/17/2023  8:45 AM Titi Hy, PT [facility]   2/20/2023  8:45 AM Titi Hy, PT [facility]   2/22/2023  8:45 AM Titi Hy, PT [facility]   2/22/2023  3:00 PM Akiko Raphael III, MD [facility]   2/24/2023  8:45 AM Titi Hy, PT [facility]   2/27/2023  8:45 AM Titi Hy, PT [facility]   3/1/2023  8:45 AM Titi Hy, PT [facility]   3/3/2023  8:45 AM Titi Hy, PT [facility]   3/6/2023  9:30 AM Titi Hy, PT [facility]   3/8/2023  8:45 AM Titi Hy, PT [facility] 3/10/2023  8:45 AM Kimmie Mata, PT [facility]   3/13/2023  8:45 AM Kimmie Mata, PT [facility]   3/16/2023  8:45 AM Kimmie Mata, PT [facility]   3/17/2023  8:45 AM Kimmie Mata, PT [facility]   3/20/2023  8:45 AM Kimmie Mata, PT [facility]   3/22/2023  8:45 AM Kimmie Mata, PT [facility]   3/24/2023  8:45 AM Kimmie Mata, PT [facility]   3/27/2023  8:45 AM Kimmie Mata, PT [facility]   3/29/2023  8:45 AM Kimmie Mata, PT [facility]   3/31/2023  8:45 AM Kimmie Mata, PT [facility]

## 2023-02-01 ENCOUNTER — HOSPITAL ENCOUNTER (OUTPATIENT)
Dept: PHYSICAL THERAPY | Age: 23
Setting detail: RECURRING SERIES
Discharge: HOME | End: 2023-02-04
Payer: COMMERCIAL

## 2023-02-01 PROCEDURE — 97016 VASOPNEUMATIC DEVICE THERAPY: CPT

## 2023-02-01 PROCEDURE — 97110 THERAPEUTIC EXERCISES: CPT

## 2023-02-01 NOTE — PROGRESS NOTES
Hermann Perea  : 2000  Primary: Hermesbo Venessaleyda Sc [facility])  Secondary: Seun Alvarado @ [address]  [facility] [address]  Phone: [phone]  Fax: [phone] Plan Frequency: 2-3x per week    Plan of Care/Certification Expiration Date: 23      PT Visit Info:  Plan Frequency: 2-3x per week  Plan of Care/Certification Expiration Date: 23      Visit Count:  10    OUTPATIENT PHYSICAL THERAPY:OP NOTE TYPE: Progress Note and Treatment Note 2023       Episode  }Appt Desk             Treatment Diagnosis:  Difficulty in walking, Not elsewhere classified (R26.2)  Other abnormalities of gait and mobility (R26.89)  Medical/Referring Diagnosis:  Rupture of left Achilles tendon, subsequent encounter [S86.012D]  Acute post-operative pain [G89.18]  Referring Physician:  Kee Ware MD MD Orders:  PT Eval and Treat   Date of Onset:  Onset Date: 22     Allergies:   Patient has no known allergies. Restrictions/Precautions:  Restrictions/Precautions: Surgical Protocols  No data recorded   Interventions Planned (Treatment may consist of any combination of the following):    Current Treatment Recommendations: Strengthening; ROM; Balance training; Functional mobility training; ADL/Self-care training; Gait training; Stair training; Neuromuscular re-education; Manual; Home exercise program; Modalities; Therapeutic activities     Subjective Comments: Continues to have min soreness after last session. Overall feeling better.       Initial:}     0/10Post Session:        0/10  Medications Last Reviewed:  2023  Updated Objective Findings:  PN 23    OBJECTIVE   Observation  Incision: healed     Girth measurements (R/L in cm)  Figure 8                       50.5/51     ROM (R/L in °) AROM(PROM)  Ankle DF                     14/9  CKC ankle DF             NT/NT  Ankle PF                     55/45  Ankle eversion            30/30  Ankle inversion 10/10        Strength (R/L)   Ankle DF                     5/5  Ankle PF                     5/5 (MMT)  Ankle eversion            5/4+  Ankle inversion           5 /4+     Palpation  No warmth, no pitting edema    Outcome Measure: Tool Used: FOOT AND ANKLE ABILITY MEASURE  Score:  Initial: 55 Most Recent: 70 (Date: 2/1/23  )      Tool Used: FAAM - Sport subscale  Score:  Initial: 0 Most Recent: X (Date: -- )       Goals: (Goals have been discussed and agreed upon with patient.)  Short-Term Functional Goals: Time Frame: 2 weeks  Patient will be independent with HEP. (Complete)  Patient will decrease pain to < 2/10. (Complete)  Patient will decrease swelling to be symmetrical to contralateral side (complete)  Discharge Goals: Time Frame: 16 weeks  Patient will decrease ankle pain to 0/10 in order to complete full day of classes. (ongoing)  Patient will increase DF ROM to at least 15 in order to improve walking mechanics (ongoing)  Patient will increase ankle PF strength to 5/5 to improve ability to jump on L LE. (ongoing)  Treatment   TREATMENT:   THERAPEUTIC ACTIVITY: ( see below for minutes): Therapeutic activities per grid below to improve mobility, strength, balance and coordination. Required minimal visual, verbal, manual and tactile cues to improve independence and safety with daily activities . THERAPEUTIC EXERCISE: (see below for minutes): Exercises per grid below to improve mobility, strength, balance and coordination. Required minimal verbal and manual cues to promote proper body alignment, promote proper body posture and promote proper body mechanics. Progressed resistance, range, repetitions and complexity of movement as indicated. MANUAL THERAPY: (see below for minutes): Joint mobilization and Soft tissue mobilization was utilized and necessary because of the patient's restricted joint motion, painful spasm, loss of articular motion and restricted motion of soft tissue.    MODALITIES: (see below for minutes): to decrease pain   SELF CARE: (see below for minutes): Procedure(s) (per grid) utilized to improve and/or restore self-care/home management as related to dressing, bathing and grooming.  Required minimal verbal cueing to facilitate activities of daily living skills and compensatory activities    Date: 1/27/23 1/30/23 2/1/23    Modalities: 15 min 15 min 15 min   Gameready 15 min 15 min 15 min                Therapeutic Exercise: 35 min 40 min 40 min    Ankle INV/EVR/DF  3x10 ea  blk Ankle INV/EVR/DF  3x10 ea  blk Ankle INV/EVR/DF  3x10 ea  blk    Seated calf raise  3x10  20# Seated calf raise  3x10  20# Seated calf raise  3x10  35#    Sidesteps  3 laps  blue at knees Sidesteps  3 laps  Black  at [employer]  3 laps  Black  at [employer] walks  3 laps  blue Monster walks  3 laps  black Monster walks  3 laps  black    Standing calf raise  2x10 BW  1x10 10# Toe walks fwd  3 laps in // bars  DL calf raise w/ weight shift  3x10    Fwd toe walk in // bars  2x3 laps Lateral step downs  3x10   4\" box Lateral toe walks  3 laps in // bars    Staggered stance calf raise  3x10 B Lateral toe walks  3 laps in // bars SL calf raise, heel elevated in // bars  3x8    MOBO holds  3x30\" SL reach at plinth  3x10 L Lateral step downs  3x10   4\" box    Rockerboard  3x30\" AP hold EO  3x30\" ML hold EC Rockerboard  3x30\" AP hold EO  3x30\" ML hold EC MOBO 2/4 balance  3x30\"      Standing toe curls  X40  blue   Proprioceptive Activities:                        Manual Therapy: 10 min 5 min 5 min    Incision mobility  5 min Incision mobility  5 min TCJ AP  Gr II  5 min    STM plantar fascia  5 min      Functional Activities:                                  Treatment/Session Summary:    Treatment Assessment: Patient has made objective improvements with decreasing pain, decreasing swelling, increasing ROM and increasing strength leading to leading to improved ability to walk around campus, ascend stairs, stand prolonged periods. They continue to have impairments with soreness, ROM, joint mobility, strength, and balancecausing difficulty with descending stairs, squatting, lifting/carrying, and running. Patient will continue to benefit from skilled physical therapy to continue improving their above impairments and functional limitations to return to their PLOF. Communication/Consultation:  None today  Equipment provided today:  None  Recommendations/Intent for next treatment session: Next visit will focus on ROM, strengthening and neuromuscular re-education per Achilles repair protocol.     Total Treatment Billable Duration: 60 minutes  Time In: 0845  Time Out: 8803    Rosey Longo PT       Charge Capture  }Post Session Pain  PT Visit Info  [employer] Portal  MD Guidelines  Scanned Media  Benefits  [employer]    Future Appointments   Date Time Provider Ruslan Jiménez   2/3/2023  8:45 AM Sharonda Cecilia, PT [facility]   2/6/2023  9:30 AM Brock Sanchez, PT [facility]   2/8/2023  8:45 AM Sharonda Cecilia, PT [facility]   2/10/2023  8:45 AM Sharonda Cecilia, PT [facility]   2/13/2023  8:45 AM Sharonda Cecilia, PT [facility]   2/15/2023  2:00 PM Sharonda Cecilia, PT [facility]   2/17/2023  8:45 AM Sharonad Cecilia, PT [facility]   2/20/2023  8:45 AM Sharonda Cecilia, PT [facility]   2/22/2023  8:45 AM Sharonda Cecilia, PT [facility]   2/22/2023  3:00 PM Katie Interiano III, MD [facility]   2/24/2023  8:45 AM Sharonda Cecilia, PT [facility]   2/27/2023  8:45 AM Sharonda Cecilia, PT [facility]   3/1/2023  8:45 AM Sharonda Cecilia, PT [facility]   3/3/2023  8:45 AM Sharonda Cecilia, PT [facility]   3/6/2023  9:30 AM Sharonda Cecilia, PT [facility]   3/8/2023  8:45 AM Sharonda Cecilia, PT [facility]   3/10/2023  8:45 AM Sharonda Cecilia, PT [facility]   3/13/2023  8:45 AM Sharonda Cecilia, PT [facility]   3/16/2023  8:45 AM Sharonda Cecilia, PT [facility]   3/17/2023  8:45 AM Sharonda Cecilia, PT [facility]   3/20/2023  8:45 AM Sharonda Cecilia, PT [facility]   3/22/2023  8:45 AM Lissett Minor, PT [facility]   3/24/2023  8:45 AM Lissett Minor, PT [facility]   3/27/2023  8:45 AM Lissett Minor, PT [facility]   3/29/2023  8:45 AM Lissett Minor, PT [facility]   3/31/2023  8:45 AM Lissett Minor, PT [facility]

## 2023-02-03 ENCOUNTER — HOSPITAL ENCOUNTER (OUTPATIENT)
Dept: PHYSICAL THERAPY | Age: 23
Setting detail: RECURRING SERIES
Discharge: HOME | End: 2023-02-06
Payer: COMMERCIAL

## 2023-02-03 PROCEDURE — 97016 VASOPNEUMATIC DEVICE THERAPY: CPT

## 2023-02-03 PROCEDURE — 97110 THERAPEUTIC EXERCISES: CPT

## 2023-02-03 NOTE — PROGRESS NOTES
Martha Eubanks  : 2000  Primary: Tiffanie Preethi [facility])  Secondary: Seun Alvarado @ [address]  [facility] [address]  Phone: [phone]  Fax: [phone] Plan Frequency: 2-3x per week    Plan of Care/Certification Expiration Date: 23      PT Visit Info:  Plan Frequency: 2-3x per week  Plan of Care/Certification Expiration Date: 23      Visit Count:  11    OUTPATIENT PHYSICAL THERAPY:OP NOTE TYPE: Progress Note and Treatment Note 2/3/2023       Episode  }Appt Desk             Treatment Diagnosis:  Difficulty in walking, Not elsewhere classified (R26.2)  Other abnormalities of gait and mobility (R26.89)  Medical/Referring Diagnosis:  Rupture of left Achilles tendon, subsequent encounter [S86.012D]  Acute post-operative pain [G89.18]  Referring Physician:  Hannah Walsh MD MD Orders:  PT Eval and Treat   Date of Onset:  Onset Date: 22     Allergies:   Patient has no known allergies. Restrictions/Precautions:  Restrictions/Precautions: Surgical Protocols  No data recorded   Interventions Planned (Treatment may consist of any combination of the following):    Current Treatment Recommendations: Strengthening; ROM; Balance training; Functional mobility training; ADL/Self-care training; Gait training; Stair training; Neuromuscular re-education; Manual; Home exercise program; Modalities; Therapeutic activities     Subjective Comments: Reports \"this is the best my ankle has felt since surgery. \"     Initial:}     010Post Session:        010  Medications Last Reviewed:  2/3/2023  Updated Objective Findings:  PN 23    OBJECTIVE   Observation  Incision: healed     Girth measurements (R/L in cm)  Figure 8                       50.5/51     ROM (R/L in °) AROM(PROM)  Ankle DF                     14/9  CKC ankle DF             NT/NT  Ankle PF                     55/45  Ankle eversion            30/30  Ankle inversion           10/10        Strength (R/L)   Ankle DF                     5/5  Ankle PF                     5/5 (MMT)  Ankle eversion            5/4+  Ankle inversion           5 /4+     Palpation  No warmth, no pitting edema    Outcome Measure: Tool Used: FOOT AND ANKLE ABILITY MEASURE  Score:  Initial: 55 Most Recent: 70 (Date: 2/1/23  )      Tool Used: FAAM - Sport subscale  Score:  Initial: 0 Most Recent: X (Date: -- )       Goals: (Goals have been discussed and agreed upon with patient.)  Short-Term Functional Goals: Time Frame: 2 weeks  Patient will be independent with HEP. (Complete)  Patient will decrease pain to < 2/10. (Complete)  Patient will decrease swelling to be symmetrical to contralateral side (complete)  Discharge Goals: Time Frame: 16 weeks  Patient will decrease ankle pain to 0/10 in order to complete full day of classes. (ongoing)  Patient will increase DF ROM to at least 15 in order to improve walking mechanics (ongoing)  Patient will increase ankle PF strength to 5/5 to improve ability to jump on L LE. (ongoing)  Treatment   TREATMENT:   THERAPEUTIC ACTIVITY: ( see below for minutes): Therapeutic activities per grid below to improve mobility, strength, balance and coordination. Required minimal visual, verbal, manual and tactile cues to improve independence and safety with daily activities . THERAPEUTIC EXERCISE: (see below for minutes): Exercises per grid below to improve mobility, strength, balance and coordination. Required minimal verbal and manual cues to promote proper body alignment, promote proper body posture and promote proper body mechanics. Progressed resistance, range, repetitions and complexity of movement as indicated. MANUAL THERAPY: (see below for minutes): Joint mobilization and Soft tissue mobilization was utilized and necessary because of the patient's restricted joint motion, painful spasm, loss of articular motion and restricted motion of soft tissue.    MODALITIES: (see below for minutes): to decrease pain   SELF CARE: (see below for minutes): Procedure(s) (per grid) utilized to improve and/or restore self-care/home management as related to dressing, bathing and grooming.  Required minimal verbal cueing to facilitate activities of daily living skills and compensatory activities    Date: 1/27/23  1/30/23  2/1/23  2/3/23    Modalities: 15 min 15 min 15 min 15 min   Gameready 15 min 15 min 15 min  15 min                 Therapeutic Exercise: 35 min 40 min 40 min 40 min    Ankle INV/EVR/DF  3x10 ea  blk Ankle INV/EVR/DF  3x10 ea  blk Ankle INV/EVR/DF  3x10 ea  blk Ankle INV/EVR/DF  3x10 ea  blk    Seated calf raise  3x10  20# Seated calf raise  3x10  20# Seated calf raise  3x10  35# Seated calf raise  3x10  35#    Sidesteps  3 laps  blue at knees Sidesteps  3 laps  Black  at [employer]  3 laps  Black  at knees Standing calf raise, R foot on half foam  3x10    Monster walks  3 laps  blue Monster walks  3 laps  black Monster walks  3 laps  black Staggered stance calf raise  3x10 B    Standing calf raise  2x10 BW  1x10 10# Toe walks fwd  3 laps in // bars  DL calf raise w/ weight shift  3x10 Sidesteps  3 laps  blk    Fwd toe walk in // bars  2x3 laps Lateral step downs  3x10   4\" box Lateral toe walks  3 laps in // bars Monster walk  3 laps  blk    Staggered stance calf raise  3x10 B Lateral toe walks  3 laps in // bars SL calf raise, heel elevated in // bars  3x8 Toe walking in // bars  3 laps lat  3 laps fwd    MOBO holds  3x30\" SL reach at plinth  3x10 L Lateral step downs  3x10   4\" box SL calf raise hands on wall, 1/2\" foam under heel    Rockerboard  3x30\" AP hold EO  3x30\" ML hold EC Rockerboard  3x30\" AP hold EO  3x30\" ML hold EC MOBO 2/4 balance  3x30\" Treadmill retro push  5 min      Standing toe curls  X40  blue MOBO 2/4  3x30\"          Proprioceptive Activities:                            Manual Therapy: 10 min 5 min 5 min 5 min    Incision mobility  5 min Incision mobility  5 min TCJ AP  Gr II  5 min TCJ AP  Gr II  5 min    STM plantar fascia  5 min       Functional Activities:                                       Treatment/Session Summary:    Treatment Assessment: Continued difficulty with SL calf raise however significant improvement with use of wall and foam under heel along with improvements following treadmill pushes. Progress may be limited due to decreased sensation at plantar aspect of foot causing patient difficulty with feeling how hard she is pushing off the ground. Communication/Consultation:  None today  Equipment provided today:  None  Recommendations/Intent for next treatment session: Next visit will focus on ROM, strengthening and neuromuscular re-education per Achilles repair protocol.     Total Treatment Billable Duration: 60 minutes  Time In: 0845  Time Out: 2538    Marikay Push, PT       Charge Capture  }Post Session Pain  PT Visit Info  MedBridge Portal  MD Guidelines  Scanned Media  Benefits  MyChart    Future Appointments   Date Time Provider Ruslan Jiménez   2/6/2023  9:30 AM Danna Cruz, PT [facility]   2/8/2023  8:45 AM Alexis Sommers, PT [facility]   2/10/2023  8:45 AM Alexis Sommers, PT [facility]   2/13/2023  8:45 AM Alexis Sommers, PT [facility]   2/15/2023  2:00 PM Alexis Sommers, PT [facility]   2/17/2023  8:45 AM Alexis Sommers, PT [facility]   2/20/2023  8:45 AM Alexis Sommers, PT [facility]   2/22/2023  8:45 AM Alexis Sommers, PT [facility]   2/22/2023  3:00 PM Radha Gray III, MD [facility] AMB   2/24/2023  8:45 AM Alexis Sommers, PT [facility]   2/27/2023  8:45 AM Alexis Sommers, PT [facility]   3/1/2023  8:45 AM Alexis Sommers, PT [facility]   3/3/2023  8:45 AM Alexis Sommers, PT [facility]   3/6/2023  9:30 AM Alexis Sommers, PT [facility]   3/8/2023  8:45 AM Alexis Sommers, PT [facility]   3/10/2023  8:45 AM Alexis Sommers, PT [facility]   3/13/2023  8:45 AM Alexis Sommers, PT [facility]   3/16/2023  8:45 AM Alexis Sommers, PT [facility] 3/17/2023  8:45 AM Swati Pyo, PT [facility]   3/20/2023  8:45 AM Swati Pyo, PT [facility]   3/22/2023  8:45 AM Swati Pyo, PT [facility]   3/24/2023  8:45 AM Swati Pyo, PT [facility]   3/27/2023  8:45 AM Swati Pyo, PT [facility]   3/29/2023  8:45 AM Swati Pyo, PT [facility]   3/31/2023  8:45 AM Swati Pyo, PT [facility]

## 2023-02-06 ENCOUNTER — HOSPITAL ENCOUNTER (OUTPATIENT)
Dept: PHYSICAL THERAPY | Age: 23
Setting detail: RECURRING SERIES
Discharge: HOME | End: 2023-02-09
Payer: COMMERCIAL

## 2023-02-06 PROCEDURE — 97016 VASOPNEUMATIC DEVICE THERAPY: CPT

## 2023-02-06 PROCEDURE — 97110 THERAPEUTIC EXERCISES: CPT

## 2023-02-06 NOTE — PROGRESS NOTES
Linda Stephens  : 2000  Primary: Luis Santoyo Sc [facility])  Secondary: Seun Alvarado @ [address]  [facility] [address]  Phone: [phone]  Fax: [phone] Plan Frequency: 2-3x per week    Plan of Care/Certification Expiration Date: 23      PT Visit Info:  Plan Frequency: 2-3x per week  Plan of Care/Certification Expiration Date: 23      Visit Count:  12    OUTPATIENT PHYSICAL THERAPY:OP NOTE TYPE: Treatment Note 2023       Episode  }Appt Desk             Treatment Diagnosis:  Difficulty in walking, Not elsewhere classified (R26.2)  Other abnormalities of gait and mobility (R26.89)  Medical/Referring Diagnosis:  Rupture of left Achilles tendon, subsequent encounter [S86.012D]  Acute post-operative pain [G89.18]  Referring Physician:  Farshad Olsen MD MD Orders:  PT Eval and Treat   Date of Onset:  Onset Date: 22     Allergies:   Patient has no known allergies. Restrictions/Precautions:  Restrictions/Precautions: Surgical Protocols  No data recorded   Interventions Planned (Treatment may consist of any combination of the following):    Current Treatment Recommendations: Strengthening; ROM; Balance training; Functional mobility training; ADL/Self-care training; Gait training; Stair training; Neuromuscular re-education; Manual; Home exercise program; Modalities; Therapeutic activities     Subjective Comments: notes morning stiffness is significantly improving. Continues to note mild loss of sensation to plantar surface of the forefoot, but this is improving.       Initial:}     0/10Post Session:        0/10  Medications Last Reviewed:  2023  Updated Objective Findings:  PN 23    OBJECTIVE   Observation  Incision: healed     Girth measurements (R/L in cm)  Figure 8                       50.5/51     ROM (R/L in °) AROM(PROM)  Ankle DF                     14/9  CKC ankle DF             NT/NT  Ankle PF                     55/45  Ankle eversion            30/30  Ankle inversion           10/10        Strength (R/L)   Ankle DF                     5/5  Ankle PF                     5/5 (MMT)  Ankle eversion            5/4+  Ankle inversion           5 /4+     Palpation  No warmth, no pitting edema    Outcome Measure: Tool Used: FOOT AND ANKLE ABILITY MEASURE  Score:  Initial: 55 Most Recent: 70 (Date: 2/1/23  )      Tool Used: FAAM - Sport subscale  Score:  Initial: 0 Most Recent: X (Date: -- )       Goals: (Goals have been discussed and agreed upon with patient.)  Short-Term Functional Goals: Time Frame: 2 weeks  Patient will be independent with HEP. (Complete)  Patient will decrease pain to < 2/10. (Complete)  Patient will decrease swelling to be symmetrical to contralateral side (complete)  Discharge Goals: Time Frame: 16 weeks  Patient will decrease ankle pain to 0/10 in order to complete full day of classes. (ongoing)  Patient will increase DF ROM to at least 15 in order to improve walking mechanics (ongoing)  Patient will increase ankle PF strength to 5/5 to improve ability to jump on L LE. (ongoing)  Treatment   TREATMENT:   THERAPEUTIC ACTIVITY: ( see below for minutes): Therapeutic activities per grid below to improve mobility, strength, balance and coordination. Required minimal visual, verbal, manual and tactile cues to improve independence and safety with daily activities . THERAPEUTIC EXERCISE: (see below for minutes): Exercises per grid below to improve mobility, strength, balance and coordination. Required minimal verbal and manual cues to promote proper body alignment, promote proper body posture and promote proper body mechanics. Progressed resistance, range, repetitions and complexity of movement as indicated.   MANUAL THERAPY: (see below for minutes): Joint mobilization and Soft tissue mobilization was utilized and necessary because of the patient's restricted joint motion, painful spasm, loss of articular motion and restricted motion of soft tissue. MODALITIES: (see below for minutes): to decrease pain   SELF CARE: (see below for minutes): Procedure(s) (per grid) utilized to improve and/or restore self-care/home management as related to dressing, bathing and grooming.  Required minimal verbal cueing to facilitate activities of daily living skills and compensatory activities    Date: 1/27/23  1/30/23  2/1/23  2/3/23  2/6/23    Modalities: 15 min 15 min 15 min 15 min    Gameready 15 min 15 min 15 min  15 min                    Therapeutic Exercise: 35 min 40 min 40 min 40 min 40 min     Ankle INV/EVR/DF  3x10 ea  blk Ankle INV/EVR/DF  3x10 ea  blk Ankle INV/EVR/DF  3x10 ea  blk Ankle INV/EVR/DF  3x10 ea  blk In/Ev/DF grey CLX 3x15    Seated calf raise  3x10  20# Seated calf raise  3x10  20# Seated calf raise  3x10  35# Seated calf raise  3x10  35# Seated calf raise 35# 3x15     Sidesteps  3 laps  blue at knees Sidesteps  3 laps  Black  at [employer]  3 laps  Black  at knees Standing calf raise, R foot on half foam  3x10 Standing calf raise R foot on half foam 4x8 cueing full height and slow lower    Monster walks  3 laps  blue Monster walks  3 laps  black Monster walks  3 laps  black Staggered stance calf raise  3x10 B Staggered stance calf raise 3x10 B     Standing calf raise  2x10 BW  1x10 10# Toe walks fwd  3 laps in // bars  DL calf raise w/ weight shift  3x10 Sidesteps  3 laps  blk Side stepping blue x 3 laps     Fwd toe walk in // bars  2x3 laps Lateral step downs  3x10   4\" box Lateral toe walks  3 laps in // bars Monster walk  3 laps  blk Monster walk blue x 3 laps    Staggered stance calf raise  3x10 B Lateral toe walks  3 laps in // bars SL calf raise, heel elevated in // bars  3x8 Toe walking in // bars  3 laps lat  3 laps fwd Walking drills: march with [place] med ball, knee hug, marchwith heel raise x 1 lap ea    MOBO holds  3x30\" SL reach at plinth  3x10 L Lateral step downs  3x10   4\" box SL calf raise hands on wall, 1/2\" foam under heel Supine sciatic nerve mobilization 20x    Rockerboard  3x30\" AP hold EO  3x30\" ML hold EC Rockerboard  3x30\" AP hold EO  3x30\" ML hold EC MOBO 2/4 balance  3x30\" Treadmill retro push  5 min Toe walking in II bars x4 laps      Standing toe curls  X40  blue MOBO 2/4  3x30\" MOBO 2/4 2x1 min static hold, 5x 10# KB pass around ea way           Proprioceptive Activities:                                Manual Therapy: 10 min 5 min 5 min 5 min     Incision mobility  5 min Incision mobility  5 min TCJ AP  Gr II  5 min TCJ AP  Gr II  5 min     STM plantar fascia  5 min        Functional Activities:                                            Treatment/Session Summary:    Treatment Assessment: progressing with control of heel height during toe walking. Further calf strengthening is required to normalize gait mechanics. Communication/Consultation:  None today  Equipment provided today:  None  Recommendations/Intent for next treatment session: Next visit will focus on ROM, strengthening and neuromuscular re-education per Achilles repair protocol.     Total Treatment Billable Duration: 60 minutes  Time In: 0845  Time Out: 199 [facility], PT       Charge Capture  }Post Session Pain  PT Visit Info  MedBridge Portal  MD Guidelines  Scanned Media  Benefits  MyChart    Future Appointments   Date Time Provider Ruslan Jiménez   2/6/2023  9:30 AM Satish Gomes, PT [facility]   2/8/2023  8:45 AM Thedore May, PT [facility]   2/10/2023  8:45 AM Thedore May, PT [facility]   2/13/2023  8:45 AM Thedore May, PT [facility]   2/15/2023  2:00 PM Thedore May, PT [facility]   2/17/2023  8:45 AM Thedore May, PT [facility]   2/20/2023  8:45 AM Thedore May, PT [facility]   2/22/2023  8:45 AM Thedore May, PT [facility]   2/22/2023  3:00 PM Radha Watts IIIMD [facility] AMB   2/24/2023  8:45 AM Thedore May, PT [facility]   2/27/2023  8:45 AM Thedore May, PT [facility]   3/1/2023  8:45 AM Wynelle Lowe, PT [facility]   3/3/2023  8:45 AM Wynelle Lowe, PT [facility]   3/6/2023  9:30 AM Wynelle Lowe, PT [facility]   3/8/2023  8:45 AM Wynelle Lowe, PT [facility]   3/10/2023  8:45 AM Wynelle Lowe, PT [facility]   3/13/2023  8:45 AM Wynelle Lowe, PT [facility]   3/16/2023  8:45 AM Wynelle Lowe, PT [facility]   3/17/2023  8:45 AM Wynelle Lowe, PT [facility]   3/20/2023  8:45 AM Wynelle Lowe, PT [facility]   3/22/2023  8:45 AM Wynelle Lowe, PT [facility]   3/24/2023  8:45 AM Wynelle Lowe, PT [facility]   3/27/2023  8:45 AM Wynelle Lowe, PT [facility]   3/29/2023  8:45 AM Wynelle Lowe, PT [facility]   3/31/2023  8:45 AM Wynelle Lowe, PT [facility]

## 2023-02-07 NOTE — PROGRESS NOTES
The patient was prescribed a walker boot for the patient's left foot. The patient wears a size NA shoe and I fitted them with a M size boot. The patient was fitted and instructed on the use of prescribed walker boot. I explained how to fit themselves and that the plastic flexible piece should always be on the front of the boot and secured by the Velcro straps on top. The air bladder in the boot was adjusted according to proper fit and comfort. The patient walked a short distance and acknowledged satisfaction with current fit. I also explained that they need a heel lift or a higher heeled shoe for the uninvolved LE to help normalize gait and avoid excessive low back stress/strain due to leg length inequality created from walker boot. Patient read and signed documenting they understand and agree to [facility]'s current DME return policy. Patent

## 2023-02-08 ENCOUNTER — HOSPITAL ENCOUNTER (OUTPATIENT)
Dept: PHYSICAL THERAPY | Age: 23
Setting detail: RECURRING SERIES
Discharge: HOME | End: 2023-02-11
Payer: COMMERCIAL

## 2023-02-08 PROCEDURE — 97016 VASOPNEUMATIC DEVICE THERAPY: CPT

## 2023-02-08 PROCEDURE — 97110 THERAPEUTIC EXERCISES: CPT

## 2023-02-08 PROCEDURE — 97140 MANUAL THERAPY 1/> REGIONS: CPT

## 2023-02-08 NOTE — PROGRESS NOTES
Martha Eubanks  : 2000  Primary: Tiffanie Preethi Hollis [facility])  Secondary: Seun Alvarado @ [address] [facility] [address]  Phone: [phone]  Fax: [phone] Plan Frequency: 2-3x per week    Plan of Care/Certification Expiration Date: 23      PT Visit Info:  Plan Frequency: 2-3x per week  Plan of Care/Certification Expiration Date: 23      Visit Count:  13    OUTPATIENT PHYSICAL THERAPY:OP NOTE TYPE: Treatment Note 2023       Episode  }Appt Desk             Treatment Diagnosis:  Difficulty in walking, Not elsewhere classified (R26.2)  Other abnormalities of gait and mobility (R26.89)  Medical/Referring Diagnosis:  Rupture of left Achilles tendon, subsequent encounter [S86.012D]  Acute post-operative pain [G89.18]  Referring Physician:  Hannah Walsh MD MD Orders:  PT Eval and Treat   Date of Onset:  Onset Date: 22     Allergies:   Patient has no known allergies. Restrictions/Precautions:  Restrictions/Precautions: Surgical Protocols  No data recorded   Interventions Planned (Treatment may consist of any combination of the following):    Current Treatment Recommendations: Strengthening; ROM; Balance training; Functional mobility training; ADL/Self-care training; Gait training; Stair training; Neuromuscular re-education; Manual; Home exercise program; Modalities; Therapeutic activities     Subjective Comments: Feeling more mobile in ankle however still decreased sensation at toes without much change.       Initial:}     0/10Post Session:        0/10  Medications Last Reviewed:  2023  Updated Objective Findings:  PN 23    OBJECTIVE   Observation  Incision: healed     Girth measurements (R/L in cm)  Figure 8                       50.5/51     ROM (R/L in °) AROM(PROM)  Ankle DF                     14/9  CKC ankle DF             NT/NT  Ankle PF                     55/45  Ankle eversion            30/30  Ankle inversion 10/10        Strength (R/L)   Ankle DF                     5/5  Ankle PF                     5/5 (MMT)  Ankle eversion            5/4+  Ankle inversion           5 /4+     Palpation  No warmth, no pitting edema    Outcome Measure: Tool Used: FOOT AND ANKLE ABILITY MEASURE  Score:  Initial: 55 Most Recent: 70 (Date: 2/1/23  )      Tool Used: FAAM - Sport subscale  Score:  Initial: 0 Most Recent: X (Date: -- )       Goals: (Goals have been discussed and agreed upon with patient.)  Short-Term Functional Goals: Time Frame: 2 weeks  Patient will be independent with HEP. (Complete)  Patient will decrease pain to < 2/10. (Complete)  Patient will decrease swelling to be symmetrical to contralateral side (complete)  Discharge Goals: Time Frame: 16 weeks  Patient will decrease ankle pain to 0/10 in order to complete full day of classes. (ongoing)  Patient will increase DF ROM to at least 15 in order to improve walking mechanics (ongoing)  Patient will increase ankle PF strength to 5/5 to improve ability to jump on L LE. (ongoing)  Treatment   TREATMENT:   THERAPEUTIC ACTIVITY: ( see below for minutes): Therapeutic activities per grid below to improve mobility, strength, balance and coordination. Required minimal visual, verbal, manual and tactile cues to improve independence and safety with daily activities . THERAPEUTIC EXERCISE: (see below for minutes): Exercises per grid below to improve mobility, strength, balance and coordination. Required minimal verbal and manual cues to promote proper body alignment, promote proper body posture and promote proper body mechanics. Progressed resistance, range, repetitions and complexity of movement as indicated. MANUAL THERAPY: (see below for minutes): Joint mobilization and Soft tissue mobilization was utilized and necessary because of the patient's restricted joint motion, painful spasm, loss of articular motion and restricted motion of soft tissue.    MODALITIES: (see below for minutes): to decrease pain   SELF CARE: (see below for minutes): Procedure(s) (per grid) utilized to improve and/or restore self-care/home management as related to dressing, bathing and grooming.  Required minimal verbal cueing to facilitate activities of daily living skills and compensatory activities    Date: 2/1/23  2/3/23  2/6/23  2/8/23    Modalities: 15 min 15 min  15 min   Gameready 15 min  15 min  15 min                 Therapeutic Exercise: 40 min 40 min 40 min  35 min     Ankle INV/EVR/DF  3x10 ea  blk Ankle INV/EVR/DF  3x10 ea  blk In/Ev/DF grey CLX 3x15 In/Ev/DF grey CLX 3x15    Seated calf raise  3x10  35# Seated calf raise  3x10  35# Seated calf raise 35# 3x15  Seated calf raise 45# 3x15     Sidesteps  3 laps  Black  at knees Standing calf raise, R foot on half foam  3x10 Standing calf raise R foot on half foam 4x8 cueing full height and slow lower Treadmil retro push  3x15    Monster walks  3 laps  black Staggered stance calf raise  3x10 B Staggered stance calf raise 3x10 B  Sled push  4 laps  50#    DL calf raise w/ weight shift  3x10 Sidesteps  3 laps  blk Side stepping blue x 3 laps  SL heel raise w/ heel elevated on MOBO  3x10    Lateral toe walks  3 laps in // bars Monster walk  3 laps  blk Monster walk blue x 3 laps Wall SL heel raise  3x10    SL calf raise, heel elevated in // bars  3x8 Toe walking in // bars  3 laps lat  3 laps fwd Walking drills: march with OH med ball, knee hug, marchwith heel raise x 1 lap ea MOBO 2/4 holds  3x30\"    Lateral step downs  3x10   4\" box SL calf raise hands on wall, 1/2\" foam under heel Supine sciatic nerve mobilization 20x SL anterior reaches  3x10    MOBO 2/4 balance  3x30\" Treadmill retro push  5 min Toe walking in II bars x4 laps     Standing toe curls  X40  blue MOBO 2/4  3x30\" MOBO 2/4 2x1 min static hold, 5x 10# KB pass around ea way           Proprioceptive Activities:                            Manual Therapy: 5 min 5 min  10 min     TCJ AP  Gr II  5 min TCJ AP  Gr II  5 min  TCJ AP  Gr II-III  5 min       Lumbar L4-L5 CPA/L UPA  Gr III  5 min   Functional Activities:                                       Treatment/Session Summary:    Treatment Assessment: Utilized sled pushes and retro treadmill pushes to emphasis toe push off with heel raise movement with positive effect. Min increase of foot sensation with push off following respective exercises. Communication/Consultation:  None today  Equipment provided today:  None  Recommendations/Intent for next treatment session: Next visit will focus on ROM, strengthening and neuromuscular re-education per Achilles repair protocol.     Total Treatment Billable Duration: 60 minutes  Time In: 0845  Time Out: 8368    Ras Vazquez PT       Charge Capture  }Post Session Pain  PT Visit Info  MedBridge Portal  MD Guidelines  Scanned Media  Benefits  MyChart    Future Appointments   Date Time Provider Ruslan Jiménez   2/10/2023  8:45 AM Padmini Amas, PT [facility]   2/13/2023  8:45 AM Padmini Amas, PT [facility]   2/15/2023  2:00 PM Padmini Amas, PT [facility]   2/17/2023  8:45 AM Padmini Amas, PT [facility]   2/20/2023  8:45 AM Padmini Amas, PT [facility]   2/22/2023  8:45 AM Padmini Amas, PT [facility]   2/22/2023  3:00 PM Chelsie Chandler III, MD [facility]   2/24/2023  8:45 AM Padmini Amas, PT [facility]   2/27/2023  8:45 AM Padmini Amas, PT [facility]   3/1/2023  8:45 AM Padmini Amas, PT [facility]   3/3/2023  8:45 AM Padmini Amas, PT [facility]   3/6/2023  9:30 AM Padmini Amas, PT [facility]   3/8/2023  8:45 AM Padmini Amas, PT [facility]   3/10/2023  8:45 AM Padmini Amas, PT [facility]   3/13/2023  8:45 AM Padmini Amas, PT [facility]   3/16/2023  8:45 AM Padmini Amas, PT [facility]   3/17/2023  8:45 AM Padmini Amas, PT [facility]   3/20/2023  8:45 AM Padmini Amas, PT [facility]   3/22/2023  8:45 AM [facility]   3/24/2023  8:45 AM [employer], PT [facility]   3/27/2023  8:45 AM Alethea Mills, PT [facility]   3/29/2023  8:45 AM Alethea Mills, PT [facility]   3/31/2023  8:45 AM Alethea Mills, PT [facility]

## 2023-02-10 ENCOUNTER — HOSPITAL ENCOUNTER (OUTPATIENT)
Dept: PHYSICAL THERAPY | Age: 23
Setting detail: RECURRING SERIES
Discharge: HOME | End: 2023-02-13
Payer: COMMERCIAL

## 2023-02-10 PROCEDURE — 97110 THERAPEUTIC EXERCISES: CPT

## 2023-02-10 PROCEDURE — 97016 VASOPNEUMATIC DEVICE THERAPY: CPT

## 2023-02-10 NOTE — PROGRESS NOTES
Ellen Castillo  : 2000  Primary: Mackenzie Hollis [facility])  Secondary: Seun Alvarado @ [address] [facility] [address]  Phone: [phone]  Fax: [phone] Plan Frequency: 2-3x per week    Plan of Care/Certification Expiration Date: 23      PT Visit Info:  Plan Frequency: 2-3x per week  Plan of Care/Certification Expiration Date: 23      Visit Count:  14    OUTPATIENT PHYSICAL THERAPY:OP NOTE TYPE: Treatment Note 2/10/2023       Episode  }Appt Desk             Treatment Diagnosis:  Difficulty in walking, Not elsewhere classified (R26.2)  Other abnormalities of gait and mobility (R26.89)  Medical/Referring Diagnosis:  Rupture of left Achilles tendon, subsequent encounter [S86.012D]  Acute post-operative pain [G89.18]  Referring Physician:  Choco Chavez MD MD Orders:  PT Eval and Treat   Date of Onset:  Onset Date: 22     Allergies:   Patient has no known allergies. Restrictions/Precautions:  Restrictions/Precautions: Surgical Protocols  No data recorded   Interventions Planned (Treatment may consist of any combination of the following):    Current Treatment Recommendations: Strengthening; ROM; Balance training; Functional mobility training; ADL/Self-care training; Gait training; Stair training; Neuromuscular re-education; Manual; Home exercise program; Modalities; Therapeutic activities     Subjective Comments: Reports slightly increased sensation at toes toward the end of this week.        Initial:}     0/10Post Session:        0/10  Medications Last Reviewed:  2/10/2023  Updated Objective Findings:  PN 23    OBJECTIVE   Observation  Incision: healed     Girth measurements (R/L in cm)  Figure 8                       50.5/51     ROM (R/L in °) AROM(PROM)  Ankle DF                     14/9  CKC ankle DF             NT/NT  Ankle PF                     55/45  Ankle eversion            30/30  Ankle inversion           10/10        Strength (R/L)   Ankle DF                     5/5  Ankle PF                     5/5 (MMT)  Ankle eversion            5/4+  Ankle inversion           5 /4+     Palpation  No warmth, no pitting edema    Outcome Measure: Tool Used: FOOT AND ANKLE ABILITY MEASURE  Score:  Initial: 55 Most Recent: 70 (Date: 2/1/23  )      Tool Used: FAAM - Sport subscale  Score:  Initial: 0 Most Recent: X (Date: -- )       Goals: (Goals have been discussed and agreed upon with patient.)  Short-Term Functional Goals: Time Frame: 2 weeks  Patient will be independent with HEP. (Complete)  Patient will decrease pain to < 2/10. (Complete)  Patient will decrease swelling to be symmetrical to contralateral side (complete)  Discharge Goals: Time Frame: 16 weeks  Patient will decrease ankle pain to 0/10 in order to complete full day of classes. (ongoing)  Patient will increase DF ROM to at least 15 in order to improve walking mechanics (ongoing)  Patient will increase ankle PF strength to 5/5 to improve ability to jump on L LE. (ongoing)  Treatment   TREATMENT:   THERAPEUTIC ACTIVITY: ( see below for minutes): Therapeutic activities per grid below to improve mobility, strength, balance and coordination. Required minimal visual, verbal, manual and tactile cues to improve independence and safety with daily activities . THERAPEUTIC EXERCISE: (see below for minutes): Exercises per grid below to improve mobility, strength, balance and coordination. Required minimal verbal and manual cues to promote proper body alignment, promote proper body posture and promote proper body mechanics. Progressed resistance, range, repetitions and complexity of movement as indicated. MANUAL THERAPY: (see below for minutes): Joint mobilization and Soft tissue mobilization was utilized and necessary because of the patient's restricted joint motion, painful spasm, loss of articular motion and restricted motion of soft tissue.    MODALITIES: (see below for minutes): to decrease pain   SELF CARE: (see below for minutes): Procedure(s) (per grid) utilized to improve and/or restore self-care/home management as related to dressing, bathing and grooming. Required minimal verbal cueing to facilitate activities of daily living skills and compensatory activities    Date: 2/6/23  2/8/23  2/10/23    Modalities:  15 min 10 min   Gameready  15 min 10 min               Therapeutic Exercise: 40 min  35 min  50 min    In/Ev/DF grey CLX 3x15 In/Ev/DF grey CLX 3x15 In/Ev/DF silver     3x10    Seated calf raise 35# 3x15  Seated calf raise 45# 3x15  PF/toe curl  X30  silver    Standing calf raise R foot on half foam 4x8 cueing full height and slow lower Treadmil retro push  3x15 Seated calf raise  5x10  40#    Staggered stance calf raise 3x10 B  Sled push  4 laps  50# Sidestep  3 laps  blk    Side stepping blue x 3 laps  SL heel raise w/ heel elevated on MOBO  3x10 SL wall heel raise  3x10    Monster walk blue x 3 laps Wall SL heel raise  3x10 Sled push  4 laps  50#    Walking drills: march with OH med ball, knee hug, marchwith heel raise x 1 lap ea MOBO 2/4 holds  3x30\" SL rebounder toss  2x15    Supine sciatic nerve mobilization 20x SL anterior reaches  3x10 SL squat  2x10  20\" box    Toe walking in II bars x4 laps  Fwd toe walk and lateral toe walk  2 laps ea    MOBO 2/4 2x1 min static hold, 5x 10# KB pass around ea way  MOBO 1/3 holds  3x30\"    SL balance floor EC  3x30\"         Proprioceptive Activities:                        Manual Therapy:  10 min  5 min     TCJ AP  Gr II-III  5 min TCJ AP  Gr II-III  5 min     Lumbar L4-L5 CPA/L UPA  Gr III  5 min    Functional Activities:                                  Treatment/Session Summary:    Treatment Assessment: Demonstrating improved SL heel raise strength however fatigues significantly throughout session.  Continuing balance progressions with more difficulty as LE fatigues      Communication/Consultation:  None today  Equipment provided today: None  Recommendations/Intent for next treatment session: Next visit will focus on ROM, strengthening and neuromuscular re-education per Achilles repair protocol.     Total Treatment Billable Duration: 65 minutes  Time In: 6013  Time Out: 0415    Dulce Maria Zamudio, PT       Charge Capture  }Post Session Pain  PT Visit Info  MedBridge Portal  MD Guidelines  Scanned Media  Benefits  MyChart    Future Appointments   Date Time Provider Ruslan Jiménez   2/13/2023  8:45 AM Avril Díaz, PT [facility]   2/15/2023  2:00 PM Avril Díaz, PT [facility]   2/17/2023  8:45 AM Avril Díaz, PT [facility]   2/20/2023  8:45 AM Avril Díaz, PT [facility]   2/22/2023  8:45 AM Avril Díaz, PT [facility]   2/22/2023  3:00 PM Zuly Wei III, MD [facility]   2/24/2023  8:45 AM Avril Díaz, PT [facility]   2/27/2023  8:45 AM Avril Díaz, PT [facility]   3/1/2023  8:45 AM Avril Díaz, PT [facility]   3/3/2023  8:45 AM Avril Díaz, PT [facility]   3/6/2023  9:30 AM Avril Díaz, PT [facility]   3/8/2023  8:45 AM Avril Díaz, PT [facility]   3/10/2023  8:45 AM Avril Díaz, PT [facility]   3/13/2023  8:45 AM Avril Díaz, PT [facility]   3/16/2023  8:45 AM Avril Díaz, PT [facility]   3/17/2023  8:45 AM Avril Díaz, PT [facility]   3/20/2023  8:45 AM Avril Díaz, PT [facility]   3/22/2023  8:45 AM Avril Díaz, PT [facility]   3/24/2023  8:45 AM Avril Díaz, PT [facility]   3/27/2023  8:45 AM Avril Díaz, PT [facility]   3/29/2023  8:45 AM Avril Díaz, PT [facility]   3/31/2023  8:45 AM CM [facility]

## 2023-02-13 ENCOUNTER — HOSPITAL ENCOUNTER (OUTPATIENT)
Dept: PHYSICAL THERAPY | Age: 23
Setting detail: RECURRING SERIES
Discharge: HOME | End: 2023-02-16
Payer: COMMERCIAL

## 2023-02-13 PROCEDURE — 97016 VASOPNEUMATIC DEVICE THERAPY: CPT

## 2023-02-13 PROCEDURE — 97140 MANUAL THERAPY 1/> REGIONS: CPT

## 2023-02-13 PROCEDURE — 97110 THERAPEUTIC EXERCISES: CPT

## 2023-02-13 NOTE — PROGRESS NOTES
Erich Lopez  : 2000  Primary: Conrad Hollis [facility])  Secondary: Seun Alvarado @ [address] [facility] [address]  Phone: [phone]  Fax: [phone] Plan Frequency: 2-3x per week    Plan of Care/Certification Expiration Date: 23      PT Visit Info:  Plan Frequency: 2-3x per week  Plan of Care/Certification Expiration Date: 23      Visit Count:  15    OUTPATIENT PHYSICAL THERAPY:OP NOTE TYPE: Treatment Note 2023       Episode  }Appt Desk             Treatment Diagnosis:  Difficulty in walking, Not elsewhere classified (R26.2)  Other abnormalities of gait and mobility (R26.89)  Medical/Referring Diagnosis:  Rupture of left Achilles tendon, subsequent encounter [S86.012D]  Acute post-operative pain [G89.18]  Referring Physician:  Dennie Oak, MD MD Orders:  PT Eval and Treat   Date of Onset:  Onset Date: 22     Allergies:   Patient has no known allergies. Restrictions/Precautions:  Restrictions/Precautions: Surgical Protocols  No data recorded   Interventions Planned (Treatment may consist of any combination of the following):    Current Treatment Recommendations: Strengthening; ROM; Balance training; Functional mobility training; ADL/Self-care training; Gait training; Stair training; Neuromuscular re-education; Manual; Home exercise program; Modalities; Therapeutic activities     Subjective Comments: Was sore for about a day and a half after last visit, but also with general LE soreness from weightlifting last week.       Initial:}     0/10Post Session:        0/10  Medications Last Reviewed:  2023  Updated Objective Findings:  PN 23    OBJECTIVE   Observation  Incision: healed     Girth measurements (R/L in cm)  Figure 8                       50.5/51     ROM (R/L in °) AROM(PROM)  Ankle DF                     14/9  CKC ankle DF             NT/NT  Ankle PF                     55/45  Ankle eversion 30/30  Ankle inversion           10/10        Strength (R/L)   Ankle DF                     5/5  Ankle PF                     5/5 (MMT)  Ankle eversion            5/4+  Ankle inversion           5 /4+     Palpation  No warmth, no pitting edema    Outcome Measure: Tool Used: FOOT AND ANKLE ABILITY MEASURE  Score:  Initial: 55 Most Recent: 70 (Date: 2/1/23  )      Tool Used: FAAM - Sport subscale  Score:  Initial: 0 Most Recent: X (Date: -- )       Goals: (Goals have been discussed and agreed upon with patient.)  Short-Term Functional Goals: Time Frame: 2 weeks  Patient will be independent with HEP. (Complete)  Patient will decrease pain to < 2/10. (Complete)  Patient will decrease swelling to be symmetrical to contralateral side (complete)  Discharge Goals: Time Frame: 16 weeks  Patient will decrease ankle pain to 0/10 in order to complete full day of classes. (ongoing)  Patient will increase DF ROM to at least 15 in order to improve walking mechanics (ongoing)  Patient will increase ankle PF strength to 5/5 to improve ability to jump on L LE. (ongoing)  Treatment   TREATMENT:   THERAPEUTIC ACTIVITY: ( see below for minutes): Therapeutic activities per grid below to improve mobility, strength, balance and coordination. Required minimal visual, verbal, manual and tactile cues to improve independence and safety with daily activities . THERAPEUTIC EXERCISE: (see below for minutes): Exercises per grid below to improve mobility, strength, balance and coordination. Required minimal verbal and manual cues to promote proper body alignment, promote proper body posture and promote proper body mechanics. Progressed resistance, range, repetitions and complexity of movement as indicated.   MANUAL THERAPY: (see below for minutes): Joint mobilization and Soft tissue mobilization was utilized and necessary because of the patient's restricted joint motion, painful spasm, loss of articular motion and restricted motion of soft tissue. MODALITIES: (see below for minutes): to decrease pain   SELF CARE: (see below for minutes): Procedure(s) (per grid) utilized to improve and/or restore self-care/home management as related to dressing, bathing and grooming.  Required minimal verbal cueing to facilitate activities of daily living skills and compensatory activities    Date: 2/6/23  2/8/23  2/10/23  2/13/23    Modalities:  15 min 10 min 15 min   Gameready  15 min 10 min 15 min                 Therapeutic Exercise: 40 min  35 min  50 min 40 min    In/Ev/DF grey CLX 3x15 In/Ev/DF grey CLX 3x15 In/Ev/DF silver     3x10 In/Ev/DF silver     3x10    Seated calf raise 35# 3x15  Seated calf raise 45# 3x15  PF/toe curl  X30  silver Seated calf raise  3x10  50#    Standing calf raise R foot on half foam 4x8 cueing full height and slow lower Treadmil retro push  3x15 Seated calf raise  5x10  40# SL calf raise,heel elevated on MOBO  3x10    Staggered stance calf raise 3x10 B  Sled push  4 laps  50# Sidestep  3 laps  blk Staggered stance calf raise  3x10 B    Side stepping blue x 3 laps  SL heel raise w/ heel elevated on MOBO  3x10 SL wall heel raise  3x10 Sidestep  3 laps  blk    Monster walk blue x 3 laps Wall SL heel raise  3x10 Sled push  4 laps  50# Sled push  4 laps  50#    Walking drills: march with [place] med ball, knee hug, marchwith heel raise x 1 lap ea MOBO 2/4 holds  3x30\" SL rebounder toss  2x15 SL rebounder ball toss  3x10    Supine sciatic nerve mobilization 20x SL anterior reaches  3x10 SL squat  2x10  20\" box DL calf raise, mirror feedback  3x10    Toe walking in II bars x4 laps  Fwd toe walk and lateral toe walk  2 laps ea Rockerboard AP hold, EC  3x30\"    MOBO 2/4 2x1 min static hold, 5x 10# KB pass around ea way  MOBO 1/3 holds  3x30\"    SL balance floor EC  3x30\"           Proprioceptive Activities:                            Manual Therapy:  10 min  5 min 10 min     TCJ AP  Gr II-III  5 min TCJ AP  Gr II-III  5 min Subtalar mobility Gr II-III  5 min     Lumbar L4-L5 CPA/L UPA  Gr III  5 min  STM tarsal tunnel  5 min   Functional Activities:                                       Treatment/Session Summary:    Treatment Assessment: Significant improvement in DL and SL heel raises today; fatigues as appropriate without increase of soreness during session. Communication/Consultation:  None today  Equipment provided today:  None  Recommendations/Intent for next treatment session: Next visit will focus on ROM, strengthening and neuromuscular re-education per Achilles repair protocol.     Total Treatment Billable Duration: 65 minutes  Time In: 0845  Time Out: 8247    Charles Rubio, PT       Charge Capture  }Post Session Pain  PT Visit Info  MedBridge Portal  MD Guidelines  Scanned Media  Benefits  MyChart    Future Appointments   Date Time Provider Ruslan Jiménez   2/15/2023  2:00 PM Norva Eduin, PT [facility]   2/17/2023  8:45 AM Norva Eduin, PT [facility]   2/20/2023  8:45 AM Norva Eduin, PT [facility]   2/22/2023  8:45 AM Norva Eduin, PT [facility]   2/22/2023  3:00 PM Jose Luis Moise III, MD [facility]   2/24/2023  8:45 AM Norva Eduin, PT [facility]   2/27/2023  8:45 AM Norva Eduin, PT [facility]   3/1/2023  8:45 AM Norva Eduin, PT [facility]   3/3/2023  8:45 AM Norva Eduin, PT [facility]   3/6/2023  9:30 AM Norva Eduin, PT [facility]   3/8/2023  8:45 AM Norva Eduin, PT [facility]   3/10/2023  8:45 AM Norva Eduin, PT [facility]   3/13/2023  8:45 AM Norva Eduin, PT [facility]   3/16/2023  8:45 AM Norva Eduin, PT [facility]   3/17/2023  8:45 AM Norva Eduin, PT [facility]   3/20/2023  8:45 AM Norva Eduin, PT [facility]   3/22/2023  8:45 AM Norva Eduin, PT [facility]   3/24/2023  8:45 AM Norva Eduin, PT [facility]   3/27/2023  8:45 AM Norva Eduin, PT [facility]   3/29/2023  8:45 AM Norva Eduin, PT [facility]   3/31/2023  8:45 AM Norva Eduin, PT [facility]

## 2023-02-15 ENCOUNTER — HOSPITAL ENCOUNTER (OUTPATIENT)
Dept: PHYSICAL THERAPY | Age: 23
Setting detail: RECURRING SERIES
Discharge: HOME | End: 2023-02-18
Payer: COMMERCIAL

## 2023-02-15 PROCEDURE — 97110 THERAPEUTIC EXERCISES: CPT

## 2023-02-15 PROCEDURE — 97016 VASOPNEUMATIC DEVICE THERAPY: CPT

## 2023-02-15 PROCEDURE — 97140 MANUAL THERAPY 1/> REGIONS: CPT

## 2023-02-15 NOTE — PROGRESS NOTES
Gerhardt Urrutia  : 2000  Primary: Brooke Cordova [facility])  Secondary: Seun Alvarado @ [address] [facility] [address]  Phone: [phone]  Fax: [phone] Plan Frequency: 2-3x per week    Plan of Care/Certification Expiration Date: 23      PT Visit Info:  Plan Frequency: 2-3x per week  Plan of Care/Certification Expiration Date: 23      Visit Count:  16    OUTPATIENT PHYSICAL THERAPY:OP NOTE TYPE: Treatment Note 2/15/2023       Episode  }Appt Desk             Treatment Diagnosis:  Difficulty in walking, Not elsewhere classified (R26.2)  Other abnormalities of gait and mobility (R26.89)  Medical/Referring Diagnosis:  Rupture of left Achilles tendon, subsequent encounter [S86.012D]  Acute post-operative pain [G89.18]  Referring Physician:  Wes Person MD MD Orders:  PT Eval and Treat   Date of Onset:  Onset Date: 22     Allergies:   Patient has no known allergies. Restrictions/Precautions:  Restrictions/Precautions: Surgical Protocols  No data recorded   Interventions Planned (Treatment may consist of any combination of the following):    Current Treatment Recommendations: Strengthening; ROM; Balance training; Functional mobility training; ADL/Self-care training; Gait training; Stair training; Neuromuscular re-education; Manual; Home exercise program; Modalities; Therapeutic activities     Subjective Comments: Not as sore after last visit; noticed foot sensation improved after last visit as well, still not completely normal yet.       Initial:}     0/10Post Session:        0/10  Medications Last Reviewed:  2/15/2023  Updated Objective Findings:  PN 23    OBJECTIVE   Observation  Incision: healed     Girth measurements (R/L in cm)  Figure 8                       50.5/51     ROM (R/L in °) AROM(PROM)  Ankle DF                     14/9  CKC ankle DF             NT/NT  Ankle PF                     55/45  Ankle eversion 30/30  Ankle inversion           10/10        Strength (R/L)   Ankle DF                     5/5  Ankle PF                     5/5 (MMT)  Ankle eversion            5/4+  Ankle inversion           5 /4+     Palpation  No warmth, no pitting edema    Outcome Measure: Tool Used: FOOT AND ANKLE ABILITY MEASURE  Score:  Initial: 55 Most Recent: 70 (Date: 2/1/23  )      Tool Used: FAAM - Sport subscale  Score:  Initial: 0 Most Recent: X (Date: -- )       Goals: (Goals have been discussed and agreed upon with patient.)  Short-Term Functional Goals: Time Frame: 2 weeks  Patient will be independent with HEP. (Complete)  Patient will decrease pain to < 2/10. (Complete)  Patient will decrease swelling to be symmetrical to contralateral side (complete)  Discharge Goals: Time Frame: 16 weeks  Patient will decrease ankle pain to 0/10 in order to complete full day of classes. (ongoing)  Patient will increase DF ROM to at least 15 in order to improve walking mechanics (ongoing)  Patient will increase ankle PF strength to 5/5 to improve ability to jump on L LE. (ongoing)  Treatment   TREATMENT:   THERAPEUTIC ACTIVITY: ( see below for minutes): Therapeutic activities per grid below to improve mobility, strength, balance and coordination. Required minimal visual, verbal, manual and tactile cues to improve independence and safety with daily activities . THERAPEUTIC EXERCISE: (see below for minutes): Exercises per grid below to improve mobility, strength, balance and coordination. Required minimal verbal and manual cues to promote proper body alignment, promote proper body posture and promote proper body mechanics. Progressed resistance, range, repetitions and complexity of movement as indicated.   MANUAL THERAPY: (see below for minutes): Joint mobilization and Soft tissue mobilization was utilized and necessary because of the patient's restricted joint motion, painful spasm, loss of articular motion and restricted motion of soft tissue. MODALITIES: (see below for minutes): to decrease pain   SELF CARE: (see below for minutes): Procedure(s) (per grid) utilized to improve and/or restore self-care/home management as related to dressing, bathing and grooming. Required minimal verbal cueing to facilitate activities of daily living skills and compensatory activities    Date: 2/10/23  2/13/23  2/15/23    Modalities: 10 min 15 min 10 min   Gameready 10 min 15 min 10 min               Therapeutic Exercise: 50 min 40 min 40 min    In/Ev/DF silver     3x10 In/Ev/DF silver     3x10 Bike  5 min    PF/toe curl  X30  silver Seated calf raise  3x10  50# Dynamic warm up: knee hug, hamstring kick, toe walk fwd and lat    Seated calf raise  5x10  40# SL calf raise,heel elevated on MOBO  3x10 Shuffle gait  2 laps    Sidestep  3 laps  blk Staggered stance calf raise  3x10 B Sidesteps  3 laps  blk    SL wall heel raise  3x10 Sidestep  3 laps  blk Monster walks  3 laps  Blk     Sled push  4 laps  50# Sled push  4 laps  50# SL calf raise  3x10    SL rebounder toss  2x15 SL rebounder ball toss  3x10 Soleus raise  3x10  55#    SL squat  2x10  20\" box DL calf raise, mirror feedback  3x10 DL calf raise  3x10  15#    Fwd toe walk and lateral toe walk  2 laps ea Rockerboard AP hold, EC  3x30\" Sled push  3 laps  100#    MOBO 1/3 holds  3x30\"    SL balance floor EC  3x30\"  Step up, forefoot on step only  3x10      SL stance rebounder toss  2x15      Calf raise: DL up, SL down  3x10               Proprioceptive Activities:                        Manual Therapy: 5 min 10 min 10 min    TCJ AP  Gr II-III  5 min Subtalar mobility Gr II-III  5 min Subtalar mobility Gr II-III  5 min     STM tarsal tunnel  5 min STM tarsal tunnel  5 min   Functional Activities:                                  Treatment/Session Summary:    Treatment Assessment: Demonstrating improved SL calf strength today, able to complete SL heel raise from floor with minimal use of hands. Communication/Consultation:  None today  Equipment provided today:  None  Recommendations/Intent for next treatment session: Next visit will focus on ROM, strengthening and neuromuscular re-education per Achilles repair protocol.     Total Treatment Billable Duration: 60 minutes  Time In: 5947  Time Out: Cristina PT       Charge Capture  }Post Session Pain  PT Visit Info  MedBridge Portal  MD Guidelines  Scanned Media  Benefits  MyChart    Future Appointments   Date Time Provider Ruslan Jessy   2/17/2023  8:45 AM Frankfort Peel, PT [facility]   2/20/2023  8:45 AM Frankfort Peel, PT [facility]   2/22/2023  8:45 AM Frankfort Peel, PT [facility]   2/22/2023  3:00 PM Simone Alaniz III, MD [facility]   2/24/2023  8:45 AM Frankfort Peel, PT [facility]   2/27/2023  8:45 AM Aaliyah Peel, PT [facility]   3/1/2023  8:45 AM Aaliyah Peel, PT [facility]   3/3/2023  8:45 AM Frankfort Peel, PT [facility]   3/6/2023  9:30 AM Aaliyah Peel, PT [facility]   3/8/2023  8:45 AM Aaliyah Peel, PT [facility]   3/10/2023  8:45 AM Aaliyah Peel, PT [facility]   3/13/2023  8:45 AM Aaliyah Peel, PT [facility]   3/16/2023  8:45 AM Aaliyah Peel, PT [facility]   3/17/2023  8:45 AM Aaliyah Peel, PT [facility]   3/20/2023  8:45 AM Frankfort Peel, PT [facility]   3/22/2023  8:45 AM Frankfort Peel, PT [facility]   3/24/2023  8:45 AM Aaliyah Peel, PT [facility]   3/27/2023  8:45 AM Frankfort Peel, PT [facility]   3/29/2023  8:45 AM Frankfort Peel, PT [facility]   3/31/2023  8:45 AM CM Mcdonald

## 2023-02-17 ENCOUNTER — HOSPITAL ENCOUNTER (OUTPATIENT)
Dept: PHYSICAL THERAPY | Age: 23
Setting detail: RECURRING SERIES
Discharge: HOME | End: 2023-02-20
Payer: COMMERCIAL

## 2023-02-17 PROCEDURE — 97140 MANUAL THERAPY 1/> REGIONS: CPT

## 2023-02-17 PROCEDURE — 97110 THERAPEUTIC EXERCISES: CPT

## 2023-02-17 PROCEDURE — 97016 VASOPNEUMATIC DEVICE THERAPY: CPT

## 2023-02-17 NOTE — PROGRESS NOTES
Dominic Arrington  : 2000  Primary: Deon [place] [facility])  Secondary: Seun Alvarado @ [address] [facility] [address]  Phone: [phone]  Fax: [phone] Plan Frequency: 2-3x per week    Plan of Care/Certification Expiration Date: 23      PT Visit Info:  Plan Frequency: 2-3x per week  Plan of Care/Certification Expiration Date: 23      Visit Count:  17    OUTPATIENT PHYSICAL THERAPY:OP NOTE TYPE: Treatment Note 2023       Episode  }Appt Desk             Treatment Diagnosis:  Difficulty in walking, Not elsewhere classified (R26.2)  Other abnormalities of gait and mobility (R26.89)  Medical/Referring Diagnosis:  Rupture of left Achilles tendon, subsequent encounter [S86.012D]  Acute post-operative pain [G89.18]  Referring Physician:  Dayton Mcrae MD MD Orders:  PT Eval and Treat   Date of Onset:  Onset Date: 22     Allergies:   Patient has no known allergies. Restrictions/Precautions:  Restrictions/Precautions: Surgical Protocols  No data recorded   Interventions Planned (Treatment may consist of any combination of the following):    Current Treatment Recommendations: Strengthening; ROM; Balance training; Functional mobility training; ADL/Self-care training; Gait training; Stair training; Neuromuscular re-education; Manual; Home exercise program; Modalities; Therapeutic activities     Subjective Comments: Little soreness in calf after last visit, not much. Is better with weight lifting when using mirror.       Initial:}     0/10Post Session:        0/10  Medications Last Reviewed:  2023  Updated Objective Findings:  PN 23    OBJECTIVE   Observation  Incision: healed     Girth measurements (R/L in cm)  Figure 8                       50.5/51     ROM (R/L in °) AROM(PROM)  Ankle DF                     14/9  CKC ankle DF             NT/NT  Ankle PF                     55/45  Ankle eversion            30/30  Ankle inversion 10/10        Strength (R/L)   Ankle DF                     5/5  Ankle PF                     5/5 (MMT)  Ankle eversion            5/4+  Ankle inversion           5 /4+     Palpation  No warmth, no pitting edema    Functional testing:  Anterior reach: R 68cm L 60.5 cm     Outcome Measure: Tool Used: FOOT AND ANKLE ABILITY MEASURE  Score:  Initial: 55 Most Recent: 70 (Date: 2/1/23  )      Tool Used: FAAM - Sport subscale  Score:  Initial: 0 Most Recent: X (Date: -- )       Goals: (Goals have been discussed and agreed upon with patient.)  Short-Term Functional Goals: Time Frame: 2 weeks  Patient will be independent with HEP. (Complete)  Patient will decrease pain to < 2/10. (Complete)  Patient will decrease swelling to be symmetrical to contralateral side (complete)  Discharge Goals: Time Frame: 16 weeks  Patient will decrease ankle pain to 0/10 in order to complete full day of classes. (ongoing)  Patient will increase DF ROM to at least 15 in order to improve walking mechanics (ongoing)  Patient will increase ankle PF strength to 5/5 to improve ability to jump on L LE. (ongoing)  Treatment   TREATMENT:   THERAPEUTIC ACTIVITY: ( see below for minutes): Therapeutic activities per grid below to improve mobility, strength, balance and coordination. Required minimal visual, verbal, manual and tactile cues to improve independence and safety with daily activities . THERAPEUTIC EXERCISE: (see below for minutes): Exercises per grid below to improve mobility, strength, balance and coordination. Required minimal verbal and manual cues to promote proper body alignment, promote proper body posture and promote proper body mechanics. Progressed resistance, range, repetitions and complexity of movement as indicated.   MANUAL THERAPY: (see below for minutes): Joint mobilization and Soft tissue mobilization was utilized and necessary because of the patient's restricted joint motion, painful spasm, loss of articular motion and restricted motion of soft tissue. MODALITIES: (see below for minutes): to decrease pain   SELF CARE: (see below for minutes): Procedure(s) (per grid) utilized to improve and/or restore self-care/home management as related to dressing, bathing and grooming.  Required minimal verbal cueing to facilitate activities of daily living skills and compensatory activities    Date: 2/10/23  2/13/23  2/15/23  2/17/23    Modalities: 10 min 15 min 10 min 10 min   Gameready 10 min 15 min 10 min 10 min                 Therapeutic Exercise: 50 min 40 min 40 min 40 min    In/Ev/DF silver     3x10 In/Ev/DF silver     3x10 Bike  5 min Bike  5 min    PF/toe curl  X30  silver Seated calf raise  3x10  50# Dynamic warm up: knee hug, hamstring kick, toe walk fwd and lat Dynamic warm up: knee hug, hamstring kick    Seated calf raise  5x10  40# SL calf raise,heel elevated on MOBO  3x10 Shuffle gait  2 laps Shuffle gait  2 laps    Sidestep  3 laps  blk Staggered stance calf raise  3x10 B Sidesteps  3 laps  blk Toe walk  2 laps fwd  2 laps lat    SL wall heel raise  3x10 Sidestep  3 laps  blk Monster walks  3 laps  Blk  SL heel raise  3x10    Sled push  4 laps  50# Sled push  4 laps  50# SL calf raise  3x10 DL pogo hop  3x20  Fwd/bckwd  2x20    SL rebounder toss  2x15 SL rebounder ball toss  3x10 Soleus raise  3x10  55# Anterior reach testing  2x10 B    SL squat  2x10  20\" box DL calf raise, mirror feedback  3x10 DL calf raise  3x10  15# Sled push  3 laps  100#    Fwd toe walk and lateral toe walk  2 laps ea Rockerboard AP hold, EC  3x30\" Sled push  3 laps  100# Calf raise: DL up, SL down  3x10    MOBO 1/3 holds  3x30\"    SL balance floor EC  3x30\"  Step up, forefoot on step only  3x10 SL rebounder  3x10      SL stance rebounder toss  2x15 MOBO 2/4 hold  3x30\"      Calf raise: DL up, SL down  3x10 Soleus raise  67.5#                 Proprioceptive Activities:                            Manual Therapy: 5 min 10 min 10 min 10 min    TCJ AP  Gr II-III  5 min Subtalar mobility Gr II-III  5 min Subtalar mobility Gr II-III  5 min Subtalar mobility Gr II-III  5 min     STM tarsal tunnel  5 min STM tarsal tunnel  5 min STM tarsal tunnel  5 min   Functional Activities:                                       Treatment/Session Summary:    Treatment Assessment: Introducing DL hopping activities today while completing other pre-jogging tasks; based on soreness rules should be appropriate to initiate jog/walk program at next visit. Communication/Consultation:  None today  Equipment provided today:  None  Recommendations/Intent for next treatment session: Next visit will focus on ROM, strengthening and neuromuscular re-education per Achilles repair protocol.     Total Treatment Billable Duration: 60 minutes  Time In: 2776  Time Out: 1153    VALENTIN OSWALD, PT       Charge Capture  }Post Session Pain  PT Visit Info  MedBridge Portal  MD Guidelines  Scanned Media  Benefits  MyChart    Future Appointments   Date Time Provider Ruslan Jiménez   2/20/2023  8:45 AM Colan Higden, PT [facility]   2/22/2023  8:45 AM Colan Higden, PT [facility]   2/22/2023  3:00 PM Hira Ardon III, MD [facility]   2/24/2023  8:45 AM Colan Higden, PT [facility]   2/27/2023  8:45 AM Colan Higden, PT [facility]   3/1/2023  8:45 AM Colan Higden, PT [facility]   3/3/2023  8:45 AM Colan Higden, PT [facility]   3/6/2023  9:30 AM Colan Higden, PT [facility]   3/8/2023  8:45 AM Colan Higden, PT [facility]   3/10/2023  8:45 AM Colan Higden, PT [facility]   3/13/2023  8:45 AM Colan Higden, PT [facility]   3/16/2023  8:45 AM Colan Higden, PT [facility]   3/17/2023  8:45 AM Colan Higden, PT [facility]   3/20/2023  8:45 AM Colan Higden, PT [facility]   3/22/2023  8:45 AM Colan Higden, PT [facility]   3/24/2023  8:45 AM Colan Higden, PT [facility]   3/27/2023  8:45 AM Colan Higden, PT [facility]   3/29/2023  8:45 AM Colan Higden, PT [facility] 3/31/2023  8:45 AM Alfredo Cain, PT [facility]

## 2023-02-20 ENCOUNTER — HOSPITAL ENCOUNTER (OUTPATIENT)
Dept: PHYSICAL THERAPY | Age: 23
Setting detail: RECURRING SERIES
Discharge: HOME | End: 2023-02-23
Payer: COMMERCIAL

## 2023-02-20 PROCEDURE — 97016 VASOPNEUMATIC DEVICE THERAPY: CPT

## 2023-02-20 PROCEDURE — 97530 THERAPEUTIC ACTIVITIES: CPT

## 2023-02-20 PROCEDURE — 97110 THERAPEUTIC EXERCISES: CPT

## 2023-02-20 NOTE — PROGRESS NOTES
Gera Sepulveda  : 2000  Primary: Chyna [facility])  Secondary: Seun Alvarado @ [address] [facility] [address]  Phone: [phone]  Fax: [phone] Plan Frequency: 2-3x per week    Plan of Care/Certification Expiration Date: 23      PT Visit Info:  Plan Frequency: 2-3x per week  Plan of Care/Certification Expiration Date: 23      Visit Count:  18    OUTPATIENT PHYSICAL THERAPY:OP NOTE TYPE: Treatment Note 2023       Episode  }Appt Desk             Treatment Diagnosis:  Difficulty in walking, Not elsewhere classified (R26.2)  Other abnormalities of gait and mobility (R26.89)  Medical/Referring Diagnosis:  Rupture of left Achilles tendon, subsequent encounter [S86.012D]  Acute post-operative pain [G89.18]  Referring Physician:  Mamie Yip MD MD Orders:  PT Eval and Treat   Date of Onset:  Onset Date: 22     Allergies:   Patient has no known allergies. Restrictions/Precautions:  Restrictions/Precautions: Surgical Protocols  No data recorded   Interventions Planned (Treatment may consist of any combination of the following):    Current Treatment Recommendations: Strengthening; ROM; Balance training; Functional mobility training; ADL/Self-care training; Gait training; Stair training; Neuromuscular re-education; Manual; Home exercise program; Modalities; Therapeutic activities     Subjective Comments: No soreness after last visit.       Initial:}     0/10Post Session:        0/10  Medications Last Reviewed:  2023  Updated Objective Findings:  PN 23    OBJECTIVE   Observation  Incision: healed     Girth measurements (R/L in cm)  Figure 8                       50.5/51     ROM (R/L in °) AROM(PROM)  Ankle DF                     14/9  CKC ankle DF             NT/NT  Ankle PF                     55/45  Ankle eversion            30/30  Ankle inversion           10/10        Strength (R/L)   Ankle DF 5/5  Ankle PF                     5/5 (MMT)  Ankle eversion            5/4+  Ankle inversion           5 /4+     Palpation  No warmth, no pitting edema    Functional testing:  Anterior reach: R 68cm L 60.5 cm     Outcome Measure: Tool Used: FOOT AND ANKLE ABILITY MEASURE  Score:  Initial: 55 Most Recent: 70 (Date: 2/1/23  )      Tool Used: FAAM - Sport subscale  Score:  Initial: 0 Most Recent: X (Date: -- )       Goals: (Goals have been discussed and agreed upon with patient.)  Short-Term Functional Goals: Time Frame: 2 weeks  Patient will be independent with HEP. (Complete)  Patient will decrease pain to < 2/10. (Complete)  Patient will decrease swelling to be symmetrical to contralateral side (complete)  Discharge Goals: Time Frame: 16 weeks  Patient will decrease ankle pain to 0/10 in order to complete full day of classes. (ongoing)  Patient will increase DF ROM to at least 15 in order to improve walking mechanics (ongoing)  Patient will increase ankle PF strength to 5/5 to improve ability to jump on L LE. (ongoing)  Treatment   TREATMENT:   THERAPEUTIC ACTIVITY: ( see below for minutes): Therapeutic activities per grid below to improve mobility, strength, balance and coordination. Required minimal visual, verbal, manual and tactile cues to improve independence and safety with daily activities . THERAPEUTIC EXERCISE: (see below for minutes): Exercises per grid below to improve mobility, strength, balance and coordination. Required minimal verbal and manual cues to promote proper body alignment, promote proper body posture and promote proper body mechanics. Progressed resistance, range, repetitions and complexity of movement as indicated. MANUAL THERAPY: (see below for minutes): Joint mobilization and Soft tissue mobilization was utilized and necessary because of the patient's restricted joint motion, painful spasm, loss of articular motion and restricted motion of soft tissue.    MODALITIES: (see below for minutes): to decrease pain   SELF CARE: (see below for minutes): Procedure(s) (per grid) utilized to improve and/or restore self-care/home management as related to dressing, bathing and grooming.  Required minimal verbal cueing to facilitate activities of daily living skills and compensatory activities    Date: 2/15/23  2/17/23  2/20/23    Modalities: 10 min 10 min 10 min   Gameready 10 min 10 min 10 min               Therapeutic Exercise: 40 min 40 min 8 min TA  47 min TE      Bike  5 min Bike  5 min     Dynamic warm up: knee hug, hamstring kick, toe walk fwd and lat Dynamic warm up: knee hug, hamstring kick Dynamic warm up: knee hug, hamstring kick, lunges    Shuffle gait  2 laps Shuffle gait  2 laps Sidestep  3 laps  Blk  Monster walk  3 laps  Blk     Sidesteps  3 laps  blk Toe walk  2 laps fwd  2 laps lat DL calf raise  3x10    Monster walks  3 laps  Blk  SL heel raise  3x10 SL calf raise  2x10    SL calf raise  3x10 DL pogo hop  3x20  Fwd/bckwd  2x20 DL pogo  1x20  Alt step taps  1x30\"    Soleus raise  3x10  55# Anterior reach testing  2x10 B A skips  1 lap    DL calf raise  3x10  15# Sled push  3 laps  100# Fwd jog 1'/' walk  X3     Sled push  3 laps  100# Calf raise: DL up, SL down  3x10 Step up on forefoot  3x10    Step up, forefoot on step only  3x10 SL rebounder  3x10 SL rebounder toss  3x10 fwd  3x10 lat    SL stance rebounder toss  2x15 MOBO 2/4 hold  3x30\" MOBO 2/4 handoffs  3x10    Calf raise: DL up, SL down  3x10 Soleus raise  67.5# SL cone reach  3x10                Proprioceptive Activities:                        Manual Therapy: 10 min 10 min     Subtalar mobility Gr II-III  5 min Subtalar mobility Gr II-III  5 min     STM tarsal tunnel  5 min STM tarsal tunnel  5 min    Functional Activities:                                  Treatment/Session Summary:    Treatment Assessment: Initiating jog/walk program today, decreased toe extension at push off as expected, fatigues with continued sets; no more than 3/10 calf/tendon soreness following jogging. Communication/Consultation:  None today  Equipment provided today:  None  Recommendations/Intent for next treatment session: Next visit will focus on ROM, strengthening and neuromuscular re-education per Achilles repair protocol.     Total Treatment Billable Duration: 65 minutes  Time In: 0845  Time Out: 9161    Carltone Rivka, PT       Charge Capture  }Post Session Pain  PT Visit Info  MedBridge Portal  MD Guidelines  Scanned Media  Benefits  MyChart    Future Appointments   Date Time Provider Ruslan Jiménez   2/22/2023  8:45 AM Pearley Gauze, PT [facility]   2/22/2023  3:00 PM Rebecca Conklin III, MD [facility]   2/24/2023  8:45 AM Pearley Gauze, PT [facility]   2/27/2023  8:45 AM Pearley Gauze, PT [facility]   3/1/2023  8:45 AM Pearley Gauze, PT [facility]   3/3/2023  8:45 AM Pearley Gauze, PT [facility]   3/6/2023  9:30 AM Pearley Gauze, PT [facility]   3/8/2023  8:45 AM Pearley Gauze, PT [facility]   3/10/2023  8:45 AM Pearley Gauze, PT [facility]   3/13/2023  8:45 AM Pearley Gauze, PT [facility]   3/16/2023  8:45 AM Pearley Gauze, PT [facility]   3/17/2023  8:45 AM Pearley Gauze, PT [facility]   3/20/2023  8:45 AM Pearley Gauze, PT [facility]   3/22/2023  8:45 AM Pearley Gauze, PT [facility]   3/24/2023  8:45 AM Pearley Gauze, PT [facility]   3/27/2023  8:45 AM Pearley Gauze, PT [facility]   3/29/2023  8:45 AM Pearley Gauze, PT [facility]   3/31/2023  8:45 AM Pearley Gauze, PT [facility]

## 2023-02-22 ENCOUNTER — HOSPITAL ENCOUNTER (OUTPATIENT)
Dept: PHYSICAL THERAPY | Age: 23
Setting detail: RECURRING SERIES
Discharge: HOME | End: 2023-02-25
Payer: COMMERCIAL

## 2023-02-22 ENCOUNTER — OFFICE VISIT (OUTPATIENT)
Dept: ORTHOPEDIC SURGERY | Age: 23
End: 2023-02-22
Payer: COMMERCIAL

## 2023-02-22 DIAGNOSIS — S86.012D RUPTURE OF LEFT ACHILLES TENDON, SUBSEQUENT ENCOUNTER: Primary | ICD-10-CM

## 2023-02-22 PROCEDURE — 97530 THERAPEUTIC ACTIVITIES: CPT

## 2023-02-22 PROCEDURE — 99213 OFFICE O/P EST LOW 20 MIN: CPT | Performed by: ORTHOPAEDIC SURGERY

## 2023-02-22 PROCEDURE — 97016 VASOPNEUMATIC DEVICE THERAPY: CPT

## 2023-02-22 PROCEDURE — 97110 THERAPEUTIC EXERCISES: CPT

## 2023-02-22 NOTE — PROGRESS NOTES
Name: Shaka Bautista  YOB: 2000  Gender: female  MRN: [id number]    Summary:     Left Achilles tendon reconstruction     CC: Ankle Pain (Procedure Performed:Left achilles primary reconstruction - Left Date of Procedure: 11/1/2022/ )       HPI: Shaka Bautista is a 25 y.o. female who presents with Ankle Pain (Procedure Performed:Left achilles primary reconstruction - Left Date of Procedure: 11/1/2022/ )  . Yadi Rodriguez returns today about 3 and half months out from a left Achilles tendon repair. She is progressing nicely with physical therapy. She is able to some light jogging now. History was obtained by Patient     ROS/Meds/PSH/PMH/FH/SH: I personally reviewed the patients standard intake form. Below are the pertinents    Tobacco:  reports that she has never smoked. She has never used smokeless tobacco.  Diabetes: None      Physical Examination:  Exam of the left ankle shows well-healed surgical incision. She has her repair is intact. The swelling is going down nicely. She is able perform both double leg and a single-leg toe rise at this point. Imaging:   No imaging reviewed           Shahrzad Dennis III, MD           Assessment:   Achilles tendon repair    Treatment Plan:   3 This is stable chronic illness/condition  Treatment at this time: Time with no intervention  Studies ordered: NO XR needed @ Next Visit    Weight-bearing status: WBAT        Return to work/work restrictions: none  No medications given    She is progressing nicely. I think she is probably to be ready to go for next fall. The plan is to continue with supervised rehab. Of also asked her to include an ankle weight to build up her calf muscle even more and advance a little bit of sport specific training. She is can return before school is out. That gives her the summer to really rehab back and then hopefully be able to advance to a full competition by the fall.

## 2023-02-22 NOTE — PROGRESS NOTES
Bradley Christopher  : 2000  Primary: [facility])  Secondary: Seun Alvarado @ [address] [facility] [address]  Phone: [phone]  Fax: [phone] Plan Frequency: 2-3x per week    Plan of Care/Certification Expiration Date: 23      PT Visit Info:  Plan Frequency: 2-3x per week  Plan of Care/Certification Expiration Date: 23      Visit Count:  19    OUTPATIENT PHYSICAL THERAPY:OP NOTE TYPE: Treatment Note 2023       Episode  }Appt Desk             Treatment Diagnosis:  Difficulty in walking, Not elsewhere classified (R26.2)  Other abnormalities of gait and mobility (R26.89)  Medical/Referring Diagnosis:  Rupture of left Achilles tendon, subsequent encounter [S86.012D]  Acute post-operative pain [G89.18]  Referring Physician:  Leanna Smith MD MD Orders:  PT Eval and Treat   Date of Onset:  Onset Date: 22     Allergies:   Patient has no known allergies. Restrictions/Precautions:  Restrictions/Precautions: Surgical Protocols  No data recorded   Interventions Planned (Treatment may consist of any combination of the following):    Current Treatment Recommendations: Strengthening; ROM; Balance training; Functional mobility training; ADL/Self-care training; Gait training; Stair training; Neuromuscular re-education; Manual; Home exercise program; Modalities; Therapeutic activities     Subjective Comments: About 4/10 Achilles soreness yesterday following jogging; no soreness today; just came from [facility].       Initial:}     0/10Post Session:        0/10  Medications Last Reviewed:  2023  Updated Objective Findings:  PN 23    OBJECTIVE   Observation  Incision: healed     Girth measurements (R/L in cm)  Figure 8                       50.5/51     ROM (R/L in °) AROM(PROM)  Ankle DF                     14/9  CKC ankle DF             NT/NT  Ankle PF                     55/45  Ankle eversion            30/30  Ankle inversion 10/10        Strength (R/L)   Ankle DF                     5/5  Ankle PF                     5/5 (MMT)  Ankle eversion            5/4+  Ankle inversion           5 /4+     Palpation  No warmth, no pitting edema    Functional testing:  Anterior reach: R 68cm L 60.5 cm     Outcome Measure: Tool Used: FOOT AND ANKLE ABILITY MEASURE  Score:  Initial: 55 Most Recent: 70 (Date: 2/1/23  )      Tool Used: FAAM - Sport subscale  Score:  Initial: 0 Most Recent: X (Date: -- )       Goals: (Goals have been discussed and agreed upon with patient.)  Short-Term Functional Goals: Time Frame: 2 weeks  Patient will be independent with HEP. (Complete)  Patient will decrease pain to < 2/10. (Complete)  Patient will decrease swelling to be symmetrical to contralateral side (complete)  Discharge Goals: Time Frame: 16 weeks  Patient will decrease ankle pain to 0/10 in order to complete full day of classes. (ongoing)  Patient will increase DF ROM to at least 15 in order to improve walking mechanics (ongoing)  Patient will increase ankle PF strength to 5/5 to improve ability to jump on L LE. (ongoing)  Treatment   TREATMENT:   THERAPEUTIC ACTIVITY: ( see below for minutes): Therapeutic activities per grid below to improve mobility, strength, balance and coordination. Required minimal visual, verbal, manual and tactile cues to improve independence and safety with daily activities . THERAPEUTIC EXERCISE: (see below for minutes): Exercises per grid below to improve mobility, strength, balance and coordination. Required minimal verbal and manual cues to promote proper body alignment, promote proper body posture and promote proper body mechanics. Progressed resistance, range, repetitions and complexity of movement as indicated.   MANUAL THERAPY: (see below for minutes): Joint mobilization and Soft tissue mobilization was utilized and necessary because of the patient's restricted joint motion, painful spasm, loss of articular motion and restricted motion of soft tissue. MODALITIES: (see below for minutes): to decrease pain   SELF CARE: (see below for minutes): Procedure(s) (per grid) utilized to improve and/or restore self-care/home management as related to dressing, bathing and grooming.  Required minimal verbal cueing to facilitate activities of daily living skills and compensatory activities    Date: 2/17/23 2/20/23 2/22/23    Modalities: 10 min 10 min 15 min   Gameready 10 min 10 min 15 min               Therapeutic Exercise: 40 min 8 min TA  47 min TE   10 min TA  40 min TE    Bike  5 min  Soleus raise  3x10  87.5#    Dynamic warm up: knee hug, hamstring kick Dynamic warm up: knee hug, hamstring kick, lunges Toe walk  3 laps fwd  3 laps lat    Shuffle gait  2 laps Sidestep  3 laps  Blk  Monster walk  3 laps  Blk  SL calf raise  3x10    Toe walk  2 laps fwd  2 laps lat DL calf raise  3x10 A skips  4 laps    SL heel raise  3x10 SL calf raise  2x10 Ladder drills-2 laps ea: 2 feet fwd, 2 feet lat, diagonals    DL pogo hop  3x20  Fwd/bckwd  2x20 DL pogo  1x20  Alt step taps  1x30\" Fwd jogging  1' jog/1' walk  X4     Anterior reach testing  2x10 B A skips  1 lap Sled push  3 laps  100#    Sled push  3 laps  100# Fwd jog 1'/1' walk  X3  DL calf raise, TRX assist  3x10    Calf raise: DL up, SL down  3x10 Step up on forefoot  3x10 MOBO 2/4 KB handoffs  3x10     SL rebounder  3x10 SL rebounder toss  3x10 fwd  3x10 lat MOBO 2/4 holds  3x30\"    MOBO 2/4 hold  3x30\" MOBO 2/4 handoffs  3x10     Soleus raise  67.5# SL cone reach  3x10                 Proprioceptive Activities:                        Manual Therapy: 10 min      Subtalar mobility Gr II-III  5 min      STM tarsal tunnel  5 min     Functional Activities:                                  Treatment/Session Summary:    Treatment Assessment:  Difficulty with toe off/toe extension during jogging, utilizing sled pushes and TRX assist calf raise to promote weight over toes in PF position with positive effect. Communication/Consultation:  None today  Equipment provided today:  None  Recommendations/Intent for next treatment session: Next visit will focus on ROM, strengthening and neuromuscular re-education per Achilles repair protocol.     Total Treatment Billable Duration: 65 minutes  Time In: 0845  Time Out: 9849    Lio Diggs, PT       Charge Capture  }Post Session Pain  PT Visit Info  MedBridge Portal  MD Guidelines  Scanned Media  Benefits  MyChart    Future Appointments   Date Time Provider Ruslan Jiménez   2/22/2023  3:00 PM Jon Ward III, MD [facility]   2/24/2023  8:45 AM Dalphine Edward, PT [facility]   2/27/2023  8:45 AM Dalphine Edward, PT [facility]   3/1/2023  8:45 AM Dalphine Edward, PT [facility]   3/3/2023  8:45 AM Dalphine Edward, PT [facility]   3/6/2023  9:30 AM Dalphine Edward, PT [facility]   3/8/2023  8:45 AM Dalphine Edward, PT [facility]   3/10/2023  8:45 AM Dalphine Edward, PT [facility]   3/13/2023  8:45 AM Dalphine Edward, PT [facility]   3/16/2023  8:45 AM Dalphine Edward, PT [facility]   3/17/2023  8:45 AM Dalphine Edward, PT [facility]   3/20/2023  8:45 AM Dalphine Edward, PT [facility]   3/22/2023  8:45 AM Dalphine Edward, PT [facility]   3/24/2023  8:45 AM Dalphine Edward, PT [facility]   3/27/2023  8:45 AM Dalphine Edward, PT [facility]   3/29/2023  8:45 AM Dalphine Edward, PT [facility]   3/31/2023  8:45 AM Dalphine Edward, PT [facility]

## 2023-02-24 ENCOUNTER — HOSPITAL ENCOUNTER (OUTPATIENT)
Dept: PHYSICAL THERAPY | Age: 23
Setting detail: RECURRING SERIES
Discharge: HOME | End: 2023-02-27
Payer: COMMERCIAL

## 2023-02-24 PROCEDURE — 97110 THERAPEUTIC EXERCISES: CPT

## 2023-02-24 PROCEDURE — 97016 VASOPNEUMATIC DEVICE THERAPY: CPT

## 2023-02-24 PROCEDURE — 97530 THERAPEUTIC ACTIVITIES: CPT

## 2023-02-24 NOTE — PROGRESS NOTES
Steve Finder  : 2000  Primary: [facility])  Secondary: Seun Alvarado @ [address] [facility] [address]  Phone: [phone]  Fax: [phone] Plan Frequency: 2-3x per week    Plan of Care/Certification Expiration Date: 23      PT Visit Info:  Plan Frequency: 2-3x per week  Plan of Care/Certification Expiration Date: 23      Visit Count:  20    OUTPATIENT PHYSICAL THERAPY:OP NOTE TYPE: Treatment Note 2023       Episode  }Appt Desk             Treatment Diagnosis:  Difficulty in walking, Not elsewhere classified (R26.2)  Other abnormalities of gait and mobility (R26.89)  Medical/Referring Diagnosis:  Rupture of left Achilles tendon, subsequent encounter [S86.012D]  Acute post-operative pain [G89.18]  Referring Physician:  Mehnaz Matson MD MD Orders:  PT Eval and Treat   Date of Onset:  Onset Date: 22     Allergies:   Patient has no known allergies. Restrictions/Precautions:  Restrictions/Precautions: Surgical Protocols  No data recorded   Interventions Planned (Treatment may consist of any combination of the following):    Current Treatment Recommendations: Strengthening; ROM; Balance training; Functional mobility training; ADL/Self-care training; Gait training; Stair training; Neuromuscular re-education; Manual; Home exercise program; Modalities; Therapeutic activities     Subjective Comments: About 5/10 Achilles soreness after Wednesday, no soreness Thursday. MD appointment went well on Wednesday, reports on pace to return to play in  season.       Initial:}     0/10Post Session:        0/10  Medications Last Reviewed:  2023  Updated Objective Findings:  PN 23    OBJECTIVE   Observation  Incision: healed     Girth measurements (R/L in cm)  Figure 8                       50.5/51     ROM (R/L in °) AROM(PROM)  Ankle DF                     14/9  CKC ankle DF             NT/NT  Ankle PF 55/45  Ankle eversion            30/30  Ankle inversion           10/10        Strength (R/L)   Ankle DF                     5/5  Ankle PF                     5/5 (MMT)  Ankle eversion            5/4+  Ankle inversion           5 /4+     Palpation  No warmth, no pitting edema    Functional testing:  Anterior reach: R 68cm L 60.5 cm     Outcome Measure: Tool Used: FOOT AND ANKLE ABILITY MEASURE  Score:  Initial: 55 Most Recent: 70 (Date: 2/1/23  )      Tool Used: FAAM - Sport subscale  Score:  Initial: 0 Most Recent: X (Date: -- )       Goals: (Goals have been discussed and agreed upon with patient.)  Short-Term Functional Goals: Time Frame: 2 weeks  Patient will be independent with HEP. (Complete)  Patient will decrease pain to < 2/10. (Complete)  Patient will decrease swelling to be symmetrical to contralateral side (complete)  Discharge Goals: Time Frame: 16 weeks  Patient will decrease ankle pain to 0/10 in order to complete full day of classes. (ongoing)  Patient will increase DF ROM to at least 15 in order to improve walking mechanics (ongoing)  Patient will increase ankle PF strength to 5/5 to improve ability to jump on L LE. (ongoing)  Treatment   TREATMENT:   THERAPEUTIC ACTIVITY: ( see below for minutes): Therapeutic activities per grid below to improve mobility, strength, balance and coordination. Required minimal visual, verbal, manual and tactile cues to improve independence and safety with daily activities . THERAPEUTIC EXERCISE: (see below for minutes): Exercises per grid below to improve mobility, strength, balance and coordination. Required minimal verbal and manual cues to promote proper body alignment, promote proper body posture and promote proper body mechanics. Progressed resistance, range, repetitions and complexity of movement as indicated.   MANUAL THERAPY: (see below for minutes): Joint mobilization and Soft tissue mobilization was utilized and necessary because of the patient's restricted joint motion, painful spasm, loss of articular motion and restricted motion of soft tissue. MODALITIES: (see below for minutes): to decrease pain   SELF CARE: (see below for minutes): Procedure(s) (per grid) utilized to improve and/or restore self-care/home management as related to dressing, bathing and grooming. Required minimal verbal cueing to facilitate activities of daily living skills and compensatory activities    Date: 23    Modalities: 15 min 10 min   Gameready 15 min 10 min             Therapeutic Exercise: 10 min TA  40 min TE 10 min TA  40 min TE    Soleus raise  3x10  87.5# Sidesteps  3 laps  blk    Toe walk  3 laps fwd  3 laps lat Monster walk  3 laps  blk    SL calf raise  3x10 Soleus raise  3x10  100#    A skips  4 laps DL pogo hop  2x20    Ladder drills-2 laps ea: 2 feet fwd, 2 feet lat, diagonals CMJ 3x5    Fwd jogging  1' jog/1' walk  X4  Ladder drills - 2 laps ea:  DL hop fwd, fast feet fwd, diagonals      Sled push  3 laps  100# TRX assist calf raise  3x10 DL  3x10 SL    DL calf raise, TRX assist  3x10 Fwd joggin' jog/1' walk x2  2' jog/1' walk x1    MOBO 2/4 KB handoffs  3x10  SL calf raise in // bars, half foam under heel  3x10    MOBO 2/4 holds  3x30\" MOBO 2/4 KB handoffs  3x15      Hip ABD at wall, RDL  3x10 B                  Proprioceptive Activities:                    Manual Therapy:               Functional Activities:                             Treatment/Session Summary:    Treatment Assessment:  Therex emphasizing toe extension at end range calf raise with positive effect, significant improvement in jogging motor control today compared to previous visits without increases of calf soreness, just fatigue reported. Communication/Consultation:  None today  Equipment provided today:  None  Recommendations/Intent for next treatment session: Next visit will focus on ROM, strengthening and neuromuscular re-education per Achilles repair protocol.     Total Treatment Billable Duration: 60 minutes  Time In: 0845  Time Out: Lulu Haney 1348, PT       Charge Capture  }Post Session Pain  PT Visit Info  MedBridge Portal  MD Guidelines  Scanned Media  Benefits  MyChart    Future Appointments   Date Time Provider Ruslan Jessy   2/27/2023  8:45 AM Savana Cam, PT [facility]   3/1/2023  8:45 AM Savana Cam, PT [facility]   3/3/2023  8:45 AM Savana Cam, PT [facility]   3/13/2023  8:45 AM Savana Cam, PT [facility]   3/16/2023  8:45 AM Savana Cam, PT [facility]   3/17/2023  8:45 AM Savana Cam, PT [facility]   3/20/2023  8:45 AM Savana Cam, PT [facility]   3/22/2023  8:45 AM Savana Cam, PT [facility]   3/24/2023  8:45 AM Savana Cam, PT [facility]   3/27/2023  8:45 AM Savana Cam, PT [facility]   3/29/2023  8:45 AM Savana Cam, PT [facility]   3/31/2023  8:45 AM Savana Cam, PT [facility]   4/26/2023 10:00 AM Maciej Gannon MD [facility]

## 2023-02-27 ENCOUNTER — HOSPITAL ENCOUNTER (OUTPATIENT)
Dept: PHYSICAL THERAPY | Age: 23
Setting detail: RECURRING SERIES
Discharge: HOME | End: 2023-03-02
Payer: COMMERCIAL

## 2023-02-27 PROCEDURE — 97110 THERAPEUTIC EXERCISES: CPT

## 2023-02-27 PROCEDURE — 97530 THERAPEUTIC ACTIVITIES: CPT

## 2023-02-27 PROCEDURE — 97016 VASOPNEUMATIC DEVICE THERAPY: CPT

## 2023-02-27 NOTE — PROGRESS NOTES
Jeovany Dunbar  : 2000  Primary: [facility])  Secondary: Seun Alvarado @ [address] [facility] [address]  Phone: [phone]  Fax: [phone] Plan Frequency: 2-3x per week    Plan of Care/Certification Expiration Date: 23      PT Visit Info:  Plan Frequency: 2-3x per week  Plan of Care/Certification Expiration Date: 23      Visit Count:  21    OUTPATIENT PHYSICAL THERAPY:OP NOTE TYPE: Progress Note and Treatment Note 2023       Episode  }Appt Desk             Treatment Diagnosis:  Difficulty in walking, Not elsewhere classified (R26.2)  Other abnormalities of gait and mobility (R26.89)  Medical/Referring Diagnosis:  Rupture of left Achilles tendon, subsequent encounter [S86.012D]  Acute post-operative pain [G89.18]  Referring Physician:  Dorita Maher MD MD Orders:  PT Eval and Treat   Date of Onset:  Onset Date: 22     Allergies:   Patient has no known allergies. Restrictions/Precautions:  Restrictions/Precautions: Surgical Protocols  No data recorded   Interventions Planned (Treatment may consist of any combination of the following):    Current Treatment Recommendations: Strengthening; ROM; Balance training; Functional mobility training; ADL/Self-care training; Gait training; Stair training; Neuromuscular re-education; Manual; Home exercise program; Modalities; Therapeutic activities     Subjective Comments: Able to continue run/walk progressions yesterday, about 6/10 Achilles soreness after finishing, but did not last longer than that day.       Initial:}     0/10Post Session:        0/10  Medications Last Reviewed:  2023  Updated Objective Findings:  PN 23    OBJECTIVE   Observation  Incision: healed     Girth measurements (R/L in cm)  Figure 8                       50.5/51     ROM (R/L in °) AROM(PROM)  Ankle DF                     15/15  CKC ankle DF             48/40  Ankle PF 55/50  Ankle eversion            30/30  Ankle inversion           10/10        Strength (R/L)   Ankle DF                     5/5  Ankle PF                     5/4+ (SL HR R 30+ / L 14)  Ankle eversion            5/4+  Ankle inversion           5 /4+     Palpation  No warmth, no pitting edema    Functional testing:  Anterior reach: R 68cm L 60.5 cm     Outcome Measure: Tool Used: FOOT AND ANKLE ABILITY MEASURE  Score:  Initial: 55 Most Recent: 70 (Date: 2/1/23  )      Tool Used: FAAM - Sport subscale  Score:  Initial: 0 Most Recent: 15 (Date: 2/27/23 )       Goals: (Goals have been discussed and agreed upon with patient.)  Short-Term Functional Goals: Time Frame: 2 weeks  Patient will be independent with HEP. (Complete)  Patient will decrease pain to < 2/10. (Complete)  Patient will decrease swelling to be symmetrical to contralateral side (complete)  Discharge Goals: Time Frame: 16 weeks  Patient will decrease ankle pain to 0/10 in order to complete full day of classes. (complete)  Patient will increase DF ROM to at least 15 in order to improve walking mechanics (ongoing)  Patient will increase ankle PF strength to 5/5 to improve ability to jump on L LE. (ongoing)  Treatment   TREATMENT:   THERAPEUTIC ACTIVITY: ( see below for minutes): Therapeutic activities per grid below to improve mobility, strength, balance and coordination. Required minimal visual, verbal, manual and tactile cues to improve independence and safety with daily activities . THERAPEUTIC EXERCISE: (see below for minutes): Exercises per grid below to improve mobility, strength, balance and coordination. Required minimal verbal and manual cues to promote proper body alignment, promote proper body posture and promote proper body mechanics. Progressed resistance, range, repetitions and complexity of movement as indicated.   MANUAL THERAPY: (see below for minutes): Joint mobilization and Soft tissue mobilization was utilized and necessary because of the patient's restricted joint motion, painful spasm, loss of articular motion and restricted motion of soft tissue. MODALITIES: (see below for minutes): to decrease pain   SELF CARE: (see below for minutes): Procedure(s) (per grid) utilized to improve and/or restore self-care/home management as related to dressing, bathing and grooming.  Required minimal verbal cueing to facilitate activities of daily living skills and compensatory activities    Date: 23    Modalities: 15 min 10 min 10 min   Gameready 15 min 10 min 10 min               Therapeutic Exercise: 10 min TA  40 min TE 10 min TA  40 min TE 15 min TA  35 min TE    Soleus raise  3x10  87.5# Sidesteps  3 laps  blk Bike  5 min    Toe walk  3 laps fwd  3 laps lat Monster walk  3 laps  blk Dynamic warm up    SL calf raise  3x10 Soleus raise  3x10  100# DL calf raise  3x10  15#    A skips  4 laps DL pogo hop  2x20 SL soleus raise  3x10   100#    Ladder drills-2 laps ea: 2 feet fwd, 2 feet lat, diagonals CMJ 3x5 Sled push  3 laps  100#    Fwd jogging  1' jog/1' walk  X4  Ladder drills - 2 laps ea:  DL hop fwd, fast feet fwd, diagonals   Ladder drills - 2 laps ea:  2 ft in fwd, 1 ft in fwd, lateral shuffle, diagonals    Sled push  3 laps  100# TRX assist calf raise  3x10 DL  3x10 SL TRX assisted SL calf raise  3x10    DL calf raise, TRX assist  3x10 Fwd joggin' jog/1' walk x2  2' jog/1' walk x1 SL rebounder toss  2x15 fwd, 2x15 lat ea way    MOBO 2/4 KB handoffs  3x10  SL calf raise in // bars, half foam under heel  3x10 Multi-reach cone taps  X30 hand taps  X30 foot taps    MOBO 2/4 holds  3x30\" MOBO 2/4 KB handoffs  3x15  DL fwd hop  2x10     Hip ABD at wall, RDL  3x10 B                      Proprioceptive Activities:                        Manual Therapy:                  Functional Activities:                                  Treatment/Session Summary:    Treatment Assessment:  Patient is now 1 day from being 17 weeks s/p Achilles tendon repair. She has made significant improvement in decreasing swelling and pain, improving ROM, and improving strength. She continues to have impairments with pain/soreness after activity/exercise, end range ROM, and strength and power limiting her from returning to full pain-free PLOF. She will benefit from continued skilled PT as originally planned to continue addressing above impairments and functional limitations. Communication/Consultation:  None today  Equipment provided today:  None  Recommendations/Intent for next treatment session: Next visit will focus on ROM, strengthening and neuromuscular re-education per Achilles repair protocol.     Total Treatment Billable Duration: 60 minutes  Time In: 0845  Time Out: 6300    Barbara Ashraf PT       Charge Capture  }Post Session Pain  PT Visit Info  MedBridge Portal  MD Guidelines  Scanned Media  Benefits  MyChart    Future Appointments   Date Time Provider Ruslan Jiménez   3/1/2023  8:45 AM Shawna Shallow, PT [facility]   3/3/2023  8:45 AM Shawna Shallow, PT [facility]   3/13/2023  8:45 AM Shawna Shallow, PT [facility]   3/16/2023  8:45 AM Shawna Shallow, PT [facility]   3/17/2023  8:45 AM Shawna Shallow, PT [facility]   3/20/2023  8:45 AM Shawna Shallow, PT [facility]   3/22/2023  8:45 AM Shawna Shallow, PT [facility]   3/24/2023  8:45 AM Shawna Shallow, PT [facility]   3/27/2023  8:45 AM Shawna Shallow, PT [facility]   3/29/2023  8:45 AM Shawna Shallow, PT [facility]   3/31/2023  8:45 AM Shawna Shallow, PT [facility]   4/26/2023 10:00 AM Lisa Rashid MD [facility]

## 2023-03-01 ENCOUNTER — HOSPITAL ENCOUNTER (OUTPATIENT)
Dept: PHYSICAL THERAPY | Age: 23
Setting detail: RECURRING SERIES
Discharge: HOME | End: 2023-03-04
Payer: COMMERCIAL

## 2023-03-01 PROCEDURE — 97110 THERAPEUTIC EXERCISES: CPT

## 2023-03-01 PROCEDURE — 97530 THERAPEUTIC ACTIVITIES: CPT

## 2023-03-01 PROCEDURE — 97016 VASOPNEUMATIC DEVICE THERAPY: CPT

## 2023-03-01 NOTE — PROGRESS NOTES
Nora Pelayo  : 2000  Primary: Celso [facility])  Secondary: Seun Alvarado @ [address]  [facility] [address]  Phone: [phone]  Fax: [phone] Plan Frequency: 2-3x per week    Plan of Care/Certification Expiration Date: 23      PT Visit Info:  Plan Frequency: 2-3x per week  Plan of Care/Certification Expiration Date: 23      Visit Count:  22    OUTPATIENT PHYSICAL THERAPY:OP NOTE TYPE: Progress Note and Treatment Note 3/1/2023       Episode  }Appt Desk             Treatment Diagnosis:  Difficulty in walking, Not elsewhere classified (R26.2)  Other abnormalities of gait and mobility (R26.89)  Medical/Referring Diagnosis:  Rupture of left Achilles tendon, subsequent encounter [S86.012D]  Acute post-operative pain [G89.18]  Referring Physician:  Chaz Dunbar MD MD Orders:  PT Eval and Treat   Date of Onset:  Onset Date: 22     Allergies:   Patient has no known allergies. Restrictions/Precautions:  Restrictions/Precautions: Surgical Protocols  No data recorded   Interventions Planned (Treatment may consist of any combination of the following):    Current Treatment Recommendations: Strengthening; ROM; Balance training; Functional mobility training; ADL/Self-care training; Gait training; Stair training; Neuromuscular re-education; Manual; Home exercise program; Modalities; Therapeutic activities     Subjective Comments: 4/10 Achilles soreness after running yesterday; no residual soreness that day.       Initial:}     0/10Post Session:        0/10  Medications Last Reviewed:  3/1/2023  Updated Objective Findings:  PN 23    OBJECTIVE   Observation  Incision: healed     Girth measurements (R/L in cm)  Figure 8                       50.5/51     ROM (R/L in °) AROM(PROM)  Ankle DF                     15/15  CKC ankle DF             48/40  Ankle PF                     55/50  Ankle eversion            30/30  Ankle inversion 10/10        Strength (R/L)   Ankle DF                     5/5  Ankle PF                     5/4+ (SL HR R 30+ / L 14)  Ankle eversion            5/4+  Ankle inversion           5 /4+     Palpation  No warmth, no pitting edema    Functional testing:  Anterior reach: R 68cm L 60.5 cm     Outcome Measure: Tool Used: FOOT AND ANKLE ABILITY MEASURE  Score:  Initial: 55 Most Recent: 70 (Date: 2/1/23  )      Tool Used: FAAM - Sport subscale  Score:  Initial: 0 Most Recent: 15 (Date: 2/27/23 )       Goals: (Goals have been discussed and agreed upon with patient.)  Short-Term Functional Goals: Time Frame: 2 weeks  Patient will be independent with HEP. (Complete)  Patient will decrease pain to < 2/10. (Complete)  Patient will decrease swelling to be symmetrical to contralateral side (complete)  Discharge Goals: Time Frame: 16 weeks  Patient will decrease ankle pain to 0/10 in order to complete full day of classes. (complete)  Patient will increase DF ROM to at least 15 in order to improve walking mechanics (ongoing)  Patient will increase ankle PF strength to 5/5 to improve ability to jump on L LE. (ongoing)  Treatment   TREATMENT:   THERAPEUTIC ACTIVITY: ( see below for minutes): Therapeutic activities per grid below to improve mobility, strength, balance and coordination. Required minimal visual, verbal, manual and tactile cues to improve independence and safety with daily activities . THERAPEUTIC EXERCISE: (see below for minutes): Exercises per grid below to improve mobility, strength, balance and coordination. Required minimal verbal and manual cues to promote proper body alignment, promote proper body posture and promote proper body mechanics. Progressed resistance, range, repetitions and complexity of movement as indicated.   MANUAL THERAPY: (see below for minutes): Joint mobilization and Soft tissue mobilization was utilized and necessary because of the patient's restricted joint motion, painful spasm, loss of articular motion and restricted motion of soft tissue. MODALITIES: (see below for minutes): to decrease pain   SELF CARE: (see below for minutes): Procedure(s) (per grid) utilized to improve and/or restore self-care/home management as related to dressing, bathing and grooming.  Required minimal verbal cueing to facilitate activities of daily living skills and compensatory activities    Date: 2/22/23  2/24/23  2/27/23  3/1/23    Modalities: 15 min 10 min 10 min 10 min   Gameready 15 min 10 min 10 min 10 min                 Therapeutic Exercise: 10 min TA  40 min TE 10 min TA  40 min TE 15 min TA  35 min TE 20 min TA  30 min TE    Soleus raise  3x10  87.5# Sidesteps  3 laps  blk Bike  5 min Bike  5 min    Toe walk  3 laps fwd  3 laps lat Monster walk  3 laps  blk Dynamic warm up Dynamic warm up:     SL calf raise  3x10 Soleus raise  3x10  100# DL calf raise  3x10  15# DL calf raise  3x10  35#    A skips  4 laps DL pogo hop  2x20 SL soleus raise  3x10   100# SL soleus raise  3x10  100#    Ladder drills-2 laps ea: 2 feet fwd, 2 feet lat, diagonals CMJ 3x5 Sled push  3 laps  100# Deadlift>calf raise  3x8  65#    Fwd jogging  1' jog/1' walk  X4  Ladder drills - 2 laps ea:  DL hop fwd, fast feet fwd, diagonals   Ladder drills - 2 laps ea:  2 ft in fwd, 1 ft in fwd, lateral shuffle, diagonals Sled push  3 laps  100#    Sled push  3 laps  100# TRX assist calf raise  3x10 DL  3x10 SL TRX assisted SL calf raise  3x10 Ladder drills - 2 laps ea:  2 ft in fwd, 1 ft in fwd, SL hop fwd, diagonals fwd    DL calf raise, TRX assist  3x10 Fwd joggin' jog/1' walk x2  2' jog/1' walk x1 SL rebounder toss  2x15 fwd, 2x15 lat ea way MOBO 2/4  3x30\"    MOBO 2/4 KB handoffs  3x10  SL calf raise in // bars, half foam under heel  3x10 Multi-reach cone taps  X30 hand taps  X30 foot taps Multi-reach cone taps  X30 hand    MOBO 2/4 holds  3x30\" MOBO 2/4 KB handoffs  3x15  DL fwd hop  2x10 Toe walk  3 laps fwd  3 laps lat  20#     Hip ABD at nadeem, RDL  3x10 B  Bounding jump in place  3x10                        Proprioceptive Activities:                            Manual Therapy:                     Functional Activities:                                       Treatment/Session Summary:    Treatment Assessment:  Continuing emphasis of toe extension/push off with therex today; fatiguing as appropriate. Discussed run/walk progressions with patient to complete independently between sessions. Communication/Consultation:  None today  Equipment provided today:  None  Recommendations/Intent for next treatment session: Next visit will focus on ROM, strengthening and neuromuscular re-education per Achilles repair protocol.     Total Treatment Billable Duration: 60 minutes  Time In: 0845  Time Out: 3122    Bobby Fernandez, PT       Charge Capture  }Post Session Pain  PT Visit Info  [employer] Portal  MD Guidelines  Scanned Media  Benefits  MyChart    Future Appointments   Date Time Provider Ruslan Jiménez   3/3/2023  8:45 AM Hong Fines, PT [facility]   3/13/2023  8:45 AM [place] Fines, PT [facility]   3/16/2023  8:45 AM Hong Fines, PT [facility]   3/17/2023  8:45 AM Hong Fines, PT [facility]   3/20/2023  8:45 AM Hong Fines, PT [facility]   3/22/2023  8:45 AM Hong Fines, PT [facility]   3/24/2023  8:45 AM [place] Fines, PT [facility]   3/27/2023  8:45 AM Hong Fines, PT [facility]   3/29/2023  8:45 AM Hong Fines, PT [facility]   3/31/2023  8:45 AM Hong Fines, PT [facility]   4/26/2023 10:00 AM Willian Hernandez MD [facility]

## 2023-03-03 ENCOUNTER — HOSPITAL ENCOUNTER (OUTPATIENT)
Dept: PHYSICAL THERAPY | Age: 23
Setting detail: RECURRING SERIES
Discharge: HOME | End: 2023-03-06
Payer: COMMERCIAL

## 2023-03-03 PROCEDURE — 97016 VASOPNEUMATIC DEVICE THERAPY: CPT

## 2023-03-03 PROCEDURE — 97530 THERAPEUTIC ACTIVITIES: CPT

## 2023-03-03 PROCEDURE — 97110 THERAPEUTIC EXERCISES: CPT

## 2023-03-03 NOTE — PROGRESS NOTES
Erich Lopez  : 2000  Primary: Conrad Hollis [facility])  Secondary: Seun Alvarado @ [address]  [facility] [address]  Phone: [phone]  Fax: [phone] Plan Frequency: 2-3x per week    Plan of Care/Certification Expiration Date: 23      PT Visit Info:  Plan Frequency: 2-3x per week  Plan of Care/Certification Expiration Date: 23      Visit Count:  23    OUTPATIENT PHYSICAL THERAPY:OP NOTE TYPE: Progress Note and Treatment Note 3/3/2023       Episode  }Appt Desk             Treatment Diagnosis:  Difficulty in walking, Not elsewhere classified (R26.2)  Other abnormalities of gait and mobility (R26.89)  Medical/Referring Diagnosis:  Rupture of left Achilles tendon, subsequent encounter [S86.012D]  Acute post-operative pain [G89.18]  Referring Physician:  Dennie Oak, MD MD Orders:  PT Eval and Treat   Date of Onset:  Onset Date: 22     Allergies:   Patient has no known allergies. Restrictions/Precautions:  Restrictions/Precautions: Surgical Protocols  No data recorded   Interventions Planned (Treatment may consist of any combination of the following):    Current Treatment Recommendations: Strengthening; ROM; Balance training; Functional mobility training; ADL/Self-care training; Gait training; Stair training; Neuromuscular re-education; Manual; Home exercise program; Modalities; Therapeutic activities     Subjective Comments: Has about 5/10 soreness immediately after jogging however no soreness later that day or into the next day. Coming straight from workout day.       Initial:}     0/10Post Session:        0/10  Medications Last Reviewed:  3/3/2023  Updated Objective Findings:  PN 23    OBJECTIVE   Observation  Incision: healed     Girth measurements (R/L in cm)  Figure 8                       50.5/51     ROM (R/L in °) AROM(PROM)  Ankle DF                     15/15  CKC ankle DF             48/40  Ankle PF 55/50  Ankle eversion            30/30  Ankle inversion           10/10        Strength (R/L)   Ankle DF                     5/5  Ankle PF                     5/4+ (SL HR R 30+ / L 14)  Ankle eversion            5/4+  Ankle inversion           5 /4+     Palpation  No warmth, no pitting edema    Functional testing:  Anterior reach: R 68cm L 60.5 cm     Outcome Measure: Tool Used: FOOT AND ANKLE ABILITY MEASURE  Score:  Initial: 55 Most Recent: 70 (Date: 2/1/23  )      Tool Used: FAAM - Sport subscale  Score:  Initial: 0 Most Recent: 15 (Date: 2/27/23 )       Goals: (Goals have been discussed and agreed upon with patient.)  Short-Term Functional Goals: Time Frame: 2 weeks  Patient will be independent with HEP. (Complete)  Patient will decrease pain to < 2/10. (Complete)  Patient will decrease swelling to be symmetrical to contralateral side (complete)  Discharge Goals: Time Frame: 16 weeks  Patient will decrease ankle pain to 0/10 in order to complete full day of classes. (complete)  Patient will increase DF ROM to at least 15 in order to improve walking mechanics (ongoing)  Patient will increase ankle PF strength to 5/5 to improve ability to jump on L LE. (ongoing)  Treatment   TREATMENT:   THERAPEUTIC ACTIVITY: ( see below for minutes): Therapeutic activities per grid below to improve mobility, strength, balance and coordination. Required minimal visual, verbal, manual and tactile cues to improve independence and safety with daily activities . THERAPEUTIC EXERCISE: (see below for minutes): Exercises per grid below to improve mobility, strength, balance and coordination. Required minimal verbal and manual cues to promote proper body alignment, promote proper body posture and promote proper body mechanics. Progressed resistance, range, repetitions and complexity of movement as indicated.   MANUAL THERAPY: (see below for minutes): Joint mobilization and Soft tissue mobilization was utilized and necessary because of the patient's restricted joint motion, painful spasm, loss of articular motion and restricted motion of soft tissue. MODALITIES: (see below for minutes): to decrease pain   SELF CARE: (see below for minutes): Procedure(s) (per grid) utilized to improve and/or restore self-care/home management as related to dressing, bathing and grooming.  Required minimal verbal cueing to facilitate activities of daily living skills and compensatory activities    Date: 2/27/23  3/1/23  3/3/23    Modalities: 10 min 10 min 15 min   Gameready 10 min 10 min 15 min               Therapeutic Exercise: 15 min TA  35 min TE 20 min TA  30 min TE 10 min TA  35 min TE    Bike  5 min Bike  5 min Leg press calf raise:  3x10 DL 80#  3x10 SL  40#    Dynamic warm up Dynamic warm up:  Soleus raise  3x10  100#    DL calf raise  3x10  15# DL calf raise  3x10  35# Toe walk  3 laps fwd and lat  20#    SL soleus raise  3x10   100# SL soleus raise  3x10  100# SL stance with ball kick  5x30    Sled push  3 laps  100# Deadlift>calf raise  3x8  65# Sled push  3 laps  120#    Ladder drills - 2 laps ea:  2 ft in fwd, 1 ft in fwd, lateral shuffle, diagonals Sled push  3 laps  100# SL calf raise isometric at wall  3x30\"    TRX assisted SL calf raise  3x10 Ladder drills - 2 laps ea:  2 ft in fwd, 1 ft in fwd, SL hop fwd, diagonals fwd DL CMJ  3x5    SL rebounder toss  2x15 fwd, 2x15 lat ea way MOBO 2/4  3x30\" Stair walking on toes  2x10     Multi-reach cone taps  X30 hand taps  X30 foot taps Multi-reach cone taps  X30 hand     DL fwd hop  2x10 Toe walk  3 laps fwd  3 laps lat  20#      Bounding jump in place  3x10                      Proprioceptive Activities:                        Manual Therapy:                  Functional Activities:                                  Treatment/Session Summary:    Treatment Assessment:  Gradual improvements in ability to isomerically stay on toes during toe walking and stair activities however continues to have difficulty secondary to strength impairments. Communication/Consultation:  None today  Equipment provided today:  None  Recommendations/Intent for next treatment session: Next visit will focus on ROM, strengthening and neuromuscular re-education per Achilles repair protocol.     Total Treatment Billable Duration: 60 minutes  Time In: 0845  Time Out: 0945    VALENTIN OSWALD, PT       Charge Capture  }Post Session Pain  PT Visit Info  [employer] Portal  MD Guidelines  Scanned Media  Benefits  MyChart    Future Appointments   Date Time Provider Ruslan Jiménez   3/13/2023  8:45 AM Dakotah Miramontes, PT [facility]   3/16/2023  8:45 AM Dakotah Miramontes, PT [facility]   3/17/2023  8:45 AM Dakotah Miramontes, PT [facility]   3/20/2023  8:45 AM Dakotah Miramontes, PT [facility]   3/22/2023  8:45 AM Dakotah Miramontes, PT [facility]   3/24/2023  8:45 AM Dakotah Miramontes, PT [facility]   3/27/2023  8:45 AM Dakotah Miramontes, PT [facility]   3/29/2023  8:45 AM Dakotah Miramontes, PT [facility]   3/31/2023  8:45 AM Dakotah Miramontes, PT [facility]   4/26/2023 10:00 AM Demetria Garrido MD [facility]

## 2023-03-06 ENCOUNTER — APPOINTMENT (OUTPATIENT)
Dept: PHYSICAL THERAPY | Age: 23
End: 2023-03-06
Payer: COMMERCIAL

## 2023-03-08 ENCOUNTER — APPOINTMENT (OUTPATIENT)
Dept: PHYSICAL THERAPY | Age: 23
End: 2023-03-08
Payer: COMMERCIAL

## 2023-03-10 ENCOUNTER — APPOINTMENT (OUTPATIENT)
Dept: PHYSICAL THERAPY | Age: 23
End: 2023-03-10
Payer: COMMERCIAL

## 2023-03-13 ENCOUNTER — HOSPITAL ENCOUNTER (OUTPATIENT)
Dept: PHYSICAL THERAPY | Age: 23
Setting detail: RECURRING SERIES
Discharge: HOME | End: 2023-03-16
Payer: COMMERCIAL

## 2023-03-13 PROCEDURE — 97016 VASOPNEUMATIC DEVICE THERAPY: CPT

## 2023-03-13 PROCEDURE — 97530 THERAPEUTIC ACTIVITIES: CPT

## 2023-03-13 PROCEDURE — 97110 THERAPEUTIC EXERCISES: CPT

## 2023-03-13 NOTE — PROGRESS NOTES
Karo Hendrickson  : 2000  Primary: [employer] (Cheri ALBARADO)  Secondary: BMI BENEFITS [facility] @ [place] [address] [place] [address]  Phone: [phone]  Fax: [phone] Plan Frequency: 2-3x per week    Plan of Care/Certification Expiration Date: 23      PT Visit Info:  Plan Frequency: 2-3x per week  Plan of Care/Certification Expiration Date: 23      Visit Count:  24    OUTPATIENT PHYSICAL THERAPY:OP NOTE TYPE: Treatment Note 3/13/2023       Episode  }Appt Desk             Treatment Diagnosis:  Difficulty in walking, Not elsewhere classified (R26.2)  Other abnormalities of gait and mobility (R26.89)  Medical/Referring Diagnosis:  Rupture of left Achilles tendon, subsequent encounter [S86.012D]  Acute post-operative pain [G89.18]  Referring Physician:  Cody Tipton III, MD MD Orders:  PT Eval and Treat   Date of Onset:  Onset Date: 22     Allergies:   Patient has no known allergies.  Restrictions/Precautions:  Restrictions/Precautions: Surgical Protocols  No data recorded   Interventions Planned (Treatment may consist of any combination of the following):    Current Treatment Recommendations: Strengthening; ROM; Balance training; Functional mobility training; ADL/Self-care training; Gait training; Stair training; Neuromuscular re-education; Manual; Home exercise program; Modalities; Therapeutic activities     Subjective Comments: Felt good over spring break, ran a couple times without  much soreness.      Initial:}     0/10Post Session:        0/10  Medications Last Reviewed:  3/13/2023  Updated Objective Findings:  PN 23    OBJECTIVE   Observation  Incision: healed     Girth measurements (R/L in cm)  Figure 8                       50.5/51     ROM (R/L in °) AROM(PROM)  Ankle DF                     15/15  CKC ankle DF             48/40  Ankle PF                     55/50  Ankle eversion            30/30  Ankle inversion           10/10        Strength  (R/L)   Ankle DF                     5/5  Ankle PF                     5/4+ (SL HR R 30+ / L 14)  Ankle eversion            5/4+  Ankle inversion           5 /4+     Palpation  No warmth, no pitting edema    Functional testing:  Anterior reach: R 68cm L 60.5 cm     Outcome Measure: Tool Used: FOOT AND ANKLE ABILITY MEASURE  Score:  Initial: 55 Most Recent: 70 (Date: 2/1/23  )      Tool Used: FAAM - Sport subscale  Score:  Initial: 0 Most Recent: 15 (Date: 2/27/23 )       Goals: (Goals have been discussed and agreed upon with patient.)  Short-Term Functional Goals: Time Frame: 2 weeks  Patient will be independent with HEP. (Complete)  Patient will decrease pain to < 2/10. (Complete)  Patient will decrease swelling to be symmetrical to contralateral side (complete)  Discharge Goals: Time Frame: 16 weeks  Patient will decrease ankle pain to 0/10 in order to complete full day of classes. (complete)  Patient will increase DF ROM to at least 15 in order to improve walking mechanics (ongoing)  Patient will increase ankle PF strength to 5/5 to improve ability to jump on L LE. (ongoing)  Treatment   TREATMENT:   THERAPEUTIC ACTIVITY: ( see below for minutes): Therapeutic activities per grid below to improve mobility, strength, balance and coordination. Required minimal visual, verbal, manual and tactile cues to improve independence and safety with daily activities . THERAPEUTIC EXERCISE: (see below for minutes): Exercises per grid below to improve mobility, strength, balance and coordination. Required minimal verbal and manual cues to promote proper body alignment, promote proper body posture and promote proper body mechanics. Progressed resistance, range, repetitions and complexity of movement as indicated.   MANUAL THERAPY: (see below for minutes): Joint mobilization and Soft tissue mobilization was utilized and necessary because of the patient's restricted joint motion, painful spasm, loss of articular motion and restricted motion of soft tissue. MODALITIES: (see below for minutes): to decrease pain   SELF CARE: (see below for minutes): Procedure(s) (per grid) utilized to improve and/or restore self-care/home management as related to dressing, bathing and grooming. Required minimal verbal cueing to facilitate activities of daily living skills and compensatory activities    Date: 3/1/23  3/3/23  3/13/23    Modalities: 10 min 15 min 15 min   Gameready 10 min 15 min 15 min                Therapeutic Exercise: 20 min TA  30 min TE 10 min TA  35 min TE 20 min TA  30 min TE    Bike  5 min Leg press calf raise:  3x10 DL 80#  3x10 SL  40# Bike  3 min    Dynamic warm up:  Soleus raise  3x10  100# Dynamic warm up    DL calf raise  3x10  35# Toe walk  3 laps fwd and lat  20# DL calf raise  3x10  SL calf raise  2x10    SL soleus raise  3x10  100# SL stance with ball kick  5x30 Leg press calf raise:  3x10 DL 80#  3x10 SL  40#    Deadlift>calf raise  3x8  65# Sled push  3 laps  120# Toe walk  3 laps fwd and lat  15#    Sled push  3 laps  100# SL calf raise isometric at wall  3x30\" Toe walk up/down stairs  5 laps    Ladder drills - 2 laps ea:  2 ft in fwd, 1 ft in fwd, SL hop fwd, diagonals fwd DL CMJ  3x5 Step tap jump 4\"   3x20    MOBO 2/4  3x30\" Stair walking on toes  2x10  DL pogo 2x20    Multi-reach cone taps  X30 hand  Ladder drills:2 laps ea:  2 ft in fwd, 1 ft in fwd, DL  hop fwd, diagonals fwd/bckwd, lateral shuffle    Toe walk  3 laps fwd  3 laps lat  20#  SL HR isometric at wall  3x15\" knee bent  3x15\" knee straight    Bounding jump in place  3x10                       Proprioceptive Activities:                        Manual Therapy:                  Functional Activities:                                  Treatment/Session Summary:    Treatment Assessment:  Improvements in SL heel raise height today; continued difficulty with SL push off high higher level activities.    Communication/Consultation:  None today  Equipment provided today:  None  Recommendations/Intent for next treatment session: Next visit will focus on ROM, strengthening and neuromuscular re-education per Achilles repair protocol.     Total Treatment Billable Duration: 65 minutes  Time In: 0845  Time Out: 4663    Keon Field PT       Charge Capture  }Post Session Pain  PT Visit Info  Med[employer] Portal  MD Guidelines  Scanned Media  Benefits  MyChart    Future Appointments   Date Time Provider Ruslan Jiménez   3/16/2023  8:45 AM Colletta Pat, PT [facility]   3/17/2023  8:45 AM Colletta Pat, PT [facility]   3/20/2023  8:45 AM Colletta Pat, PT [facility]   3/22/2023  8:45 AM Colletta Pat, PT [facility]   3/24/2023  8:45 AM Colletta Pat, PT [facility]   3/27/2023  8:45 AM Colletta Pat, PT [facility]   3/29/2023  8:45 AM Colletta Pat, PT [facility]   3/31/2023  8:45 AM Colletta Pat, PT [facility]   4/26/2023 10:00 AM Adin White MD [facility]

## 2023-03-15 ENCOUNTER — HOSPITAL ENCOUNTER (OUTPATIENT)
Dept: PHYSICAL THERAPY | Age: 23
Setting detail: RECURRING SERIES
Discharge: HOME | End: 2023-03-18
Payer: COMMERCIAL

## 2023-03-15 PROCEDURE — 97530 THERAPEUTIC ACTIVITIES: CPT

## 2023-03-15 PROCEDURE — 97110 THERAPEUTIC EXERCISES: CPT

## 2023-03-15 NOTE — PROGRESS NOTES
Dominic Arrington  : 2000  Primary: [place] [facility])  Secondary: Seun Alvarado @ [address] [facility] [address]  Phone: [phone]  Fax: [phone] Plan Frequency: 2-3x per week    Plan of Care/Certification Expiration Date: 23      PT Visit Info:  Plan Frequency: 2-3x per week  Plan of Care/Certification Expiration Date: 23      Visit Count:  25    OUTPATIENT PHYSICAL THERAPY:OP NOTE TYPE: Treatment Note 3/15/2023       Episode  }Appt Desk             Treatment Diagnosis:  Difficulty in walking, Not elsewhere classified (R26.2)  Other abnormalities of gait and mobility (R26.89)  Medical/Referring Diagnosis:  Rupture of left Achilles tendon, subsequent encounter [S86.012D]  Acute post-operative pain [G89.18]  Referring Physician:  Dayton Mcrae MD MD Orders:  PT Eval and Treat   Date of Onset:  Onset Date: 22     Allergies:   Patient has no known allergies. Restrictions/Precautions:  Restrictions/Precautions: Surgical Protocols  No data recorded   Interventions Planned (Treatment may consist of any combination of the following):    Current Treatment Recommendations: Strengthening; ROM; Balance training; Functional mobility training; ADL/Self-care training; Gait training; Stair training; Neuromuscular re-education; Manual; Home exercise program; Modalities; Therapeutic activities     Subjective Comments: Min calf soreness after last visit.       Initial:}     0/10Post Session:        0/10  Medications Last Reviewed:  3/15/2023  Updated Objective Findings:  PN 23    OBJECTIVE   Observation  Incision: healed     Girth measurements (R/L in cm)  Figure 8                       50.5/51     ROM (R/L in °) AROM(PROM)  Ankle DF                     15/15  CKC ankle DF             48/40  Ankle PF                     55/50  Ankle eversion            30/30  Ankle inversion           10/10        Strength (R/L)   Ankle DF 5/5  Ankle PF                     5/4+ (SL HR R 30+ / L 14)  Ankle eversion            5/4+  Ankle inversion           5 /4+     Palpation  No warmth, no pitting edema    Functional testing:  Anterior reach: R 68cm L 60.5 cm     Outcome Measure: Tool Used: FOOT AND ANKLE ABILITY MEASURE  Score:  Initial: 55 Most Recent: 70 (Date: 2/1/23  )      Tool Used: FAAM - Sport subscale  Score:  Initial: 0 Most Recent: 15 (Date: 2/27/23 )       Goals: (Goals have been discussed and agreed upon with patient.)  Short-Term Functional Goals: Time Frame: 2 weeks  Patient will be independent with HEP. (Complete)  Patient will decrease pain to < 2/10. (Complete)  Patient will decrease swelling to be symmetrical to contralateral side (complete)  Discharge Goals: Time Frame: 16 weeks  Patient will decrease ankle pain to 0/10 in order to complete full day of classes. (complete)  Patient will increase DF ROM to at least 15 in order to improve walking mechanics (ongoing)  Patient will increase ankle PF strength to 5/5 to improve ability to jump on L LE. (ongoing)  Treatment   TREATMENT:   THERAPEUTIC ACTIVITY: ( see below for minutes): Therapeutic activities per grid below to improve mobility, strength, balance and coordination. Required minimal visual, verbal, manual and tactile cues to improve independence and safety with daily activities . THERAPEUTIC EXERCISE: (see below for minutes): Exercises per grid below to improve mobility, strength, balance and coordination. Required minimal verbal and manual cues to promote proper body alignment, promote proper body posture and promote proper body mechanics. Progressed resistance, range, repetitions and complexity of movement as indicated. MANUAL THERAPY: (see below for minutes): Joint mobilization and Soft tissue mobilization was utilized and necessary because of the patient's restricted joint motion, painful spasm, loss of articular motion and restricted motion of soft tissue. MODALITIES: (see below for minutes): to decrease pain   SELF CARE: (see below for minutes): Procedure(s) (per grid) utilized to improve and/or restore self-care/home management as related to dressing, bathing and grooming.  Required minimal verbal cueing to facilitate activities of daily living skills and compensatory activities    Date: 3/1/23  3/3/23  3/13/23  3/15/23    Modalities: 10 min 15 min 15 min    Gameready 10 min 15 min 15 min                   Therapeutic Exercise: 20 min TA  30 min TE 10 min TA  35 min TE 20 min TA  30 min TE 15 min TE  30 minTA    Bike  5 min Leg press calf raise:  3x10 DL 80#  3x10 SL  40# Bike  3 min Bike  5 min    Dynamic warm up:  Soleus raise  3x10  100# Dynamic warm up Dynamic warm up: knee hug, hamstring, fwd lunge    DL calf raise  3x10  35# Toe walk  3 laps fwd and lat  20# DL calf raise  3x10  SL calf raise  2x10 DL calf raise  3x10 35#    SL soleus raise  3x10  100# SL stance with ball kick  5x30 Leg press calf raise:  3x10 DL 80#  3x10 SL  40# Toe walk fwd  3 laps  15#    Deadlift>calf raise  3x8  65# Sled push  3 laps  120# Toe walk  3 laps fwd and lat  15# Plyometric 2 laps ea:  -A skips  -Lateral shuffle  -Karaoke  -DL fwd hop  -SL hop      Sled push  3 laps  100# SL calf raise isometric at wall  3x30\" Toe walk up/down stairs  5 laps Alt jumping at wall  3x15\"    Ladder drills - 2 laps ea:  2 ft in fwd, 1 ft in fwd, SL hop fwd, diagonals fwd DL CMJ  3x5 Step tap jump 4\"   3x20 Sled push/pull  140#  Fwd push, lateral pull  4 laps    MOBO 2/4  3x30\" Stair walking on toes  2x10  DL pogo 2x20 DL box jump  1x5 7\"  1x5 14\"  2x5 21\"    Multi-reach cone taps  X30 hand  Ladder drills:2 laps ea:  2 ft in fwd, 1 ft in fwd, DL  hop fwd, diagonals fwd/bckwd, lateral shuffle Staggered stance, front foot on 6\" box calf raise  3x10 B    Toe walk  3 laps fwd  3 laps lat  20#  SL HR isometric at wall  3x15\" knee bent  3x15\" knee straight MOBO 2/4 KB body clock  2x10 ea CW/CCW Bounding jump in place  3x10                           Proprioceptive Activities:                            Manual Therapy:                     Functional Activities:                                       Treatment/Session Summary:    Treatment Assessment:  Continuing emphasis of toe off and full PF with activities with varying activities; improvements seen today however difficulty and soreness as she becomes fatigued. Communication/Consultation:  None today  Equipment provided today:  None  Recommendations/Intent for next treatment session: Next visit will focus on ROM, strengthening and neuromuscular re-education per Achilles repair protocol.     Total Treatment Billable Duration: 45 minutes  Time In: 0930  Time Out: 1015    VALENTIN OSWALD, PT       Charge Capture  }Post Session Pain  PT Visit Info  [facility] Portal  MD Guidelines  Scanned Media  Benefits  MyChart    Future Appointments   Date Time Provider Ruslan Kotharii   3/17/2023  8:45 AM Dunia Loja, PT [facility]   3/20/2023  8:45 AM Dunia Loja, PT [facility]   3/22/2023  8:45 AM Dunia Silveirak, PT [facility]   3/24/2023  8:45 AM Dunia Silveirak, PT [facility]   3/27/2023  8:45 AM Dunia Silveirak, PT [facility]   3/29/2023  8:45 AM Dunia Silveirak, PT [facility]   3/31/2023  8:45 AM Dunia Silveirak, PT [facility]   4/26/2023 10:00 AM [facility]

## 2023-03-17 ENCOUNTER — HOSPITAL ENCOUNTER (OUTPATIENT)
Dept: PHYSICAL THERAPY | Age: 23
Setting detail: RECURRING SERIES
Discharge: HOME | End: 2023-03-20
Payer: COMMERCIAL

## 2023-03-17 PROCEDURE — 97016 VASOPNEUMATIC DEVICE THERAPY: CPT

## 2023-03-17 PROCEDURE — 97110 THERAPEUTIC EXERCISES: CPT

## 2023-03-17 PROCEDURE — 97530 THERAPEUTIC ACTIVITIES: CPT

## 2023-03-17 NOTE — PROGRESS NOTES
because of the patient's restricted joint motion, painful spasm, loss of articular motion and restricted motion of soft tissue. MODALITIES: (see below for minutes): to decrease pain   SELF CARE: (see below for minutes): Procedure(s) (per grid) utilized to improve and/or restore self-care/home management as related to dressing, bathing and grooming.  Required minimal verbal cueing to facilitate activities of daily living skills and compensatory activities    Date: 3/13/23  3/15/23  3/17/23    Modalities: 15 min  15 min   Gameready 15 min   15 min               Therapeutic Exercise: 20 min TA  30 min TE 15 min TE  30 minTA 10 min TA  35 min TE    Bike  3 min Bike  5 min Bike  5 min    Dynamic warm up Dynamic warm up: knee hug, hamstring, fwd lunge Dynamic warm up: knee hug, hamstring, fwd/bckwd lunge    DL calf raise  3x10  SL calf raise  2x10 DL calf raise  3x10 35# DL calf raise  3x10 35#    Leg press calf raise:  3x10 DL 80#  3x10 SL  40# Toe walk fwd  3 laps  15# L calf raise, heel elevated on wedge  3x10    Toe walk  3 laps fwd and lat  15# Plyometric 2 laps ea:  -A skips  -Lateral shuffle  -Karaoke  -DL fwd hop  -SL hop   Toe walk fwd  3x10    Toe walk up/down stairs  5 laps Alt jumping at wall  3x15\" DL HR, SL eccentric  3x10     Step tap jump 4\"   3x20 Sled push/pull  140#  Fwd push, lateral pull  4 laps DL box drop  2x10  10\" box    DL pogo 2x20 DL box jump  1x5 7\"  1x5 14\"  2x5 21\" Staggered stance HR, front on 4\" box  3x10 B    Ladder drills:2 laps ea:  2 ft in fwd, 1 ft in fwd, DL  hop fwd, diagonals fwd/bckwd, lateral shuffle Staggered stance, front foot on 6\" box calf raise  3x10 B Soleus raise  3x10  112.5#    SL HR isometric at wall  3x15\" knee bent  3x15\" knee straight MOBO 2/4 KB body clock  2x10 ea CW/CCW  SL pallof press  3x10  grn                           Proprioceptive Activities:                        Manual Therapy:                  Functional Activities:

## 2023-03-20 ENCOUNTER — HOSPITAL ENCOUNTER (OUTPATIENT)
Dept: PHYSICAL THERAPY | Age: 23
Setting detail: RECURRING SERIES
Discharge: HOME | End: 2023-03-23
Payer: COMMERCIAL

## 2023-03-20 PROCEDURE — 97016 VASOPNEUMATIC DEVICE THERAPY: CPT

## 2023-03-20 PROCEDURE — 97110 THERAPEUTIC EXERCISES: CPT

## 2023-03-20 PROCEDURE — 97530 THERAPEUTIC ACTIVITIES: CPT

## 2023-03-20 NOTE — PROGRESS NOTES
Nixon Radha  : 2000  Primary: Jaron Hollis [facility])  Secondary: Seun Alvarado @ [address] [facility] [address]  Phone: [phone]  Fax: [phone] Plan Frequency: 2-3x per week    Plan of Care/Certification Expiration Date: 23      PT Visit Info:  Plan Frequency: 2-3x per week  Plan of Care/Certification Expiration Date: 23      Visit Count:  27    OUTPATIENT PHYSICAL THERAPY:OP NOTE TYPE: Treatment Note 3/20/2023       Episode  }Appt Desk             Treatment Diagnosis:  Difficulty in walking, Not elsewhere classified (R26.2)  Other abnormalities of gait and mobility (R26.89)  Medical/Referring Diagnosis:  Rupture of left Achilles tendon, subsequent encounter [S86.012D]  Acute post-operative pain [G89.18]  Referring Physician:  Giana Lara MD MD Orders:  PT Eval and Treat   Date of Onset:  Onset Date: 22     Allergies:   Patient has no known allergies. Restrictions/Precautions:  Restrictions/Precautions: Surgical Protocols  No data recorded   Interventions Planned (Treatment may consist of any combination of the following):    Current Treatment Recommendations: Strengthening; ROM; Balance training; Functional mobility training; ADL/Self-care training; Gait training; Stair training; Neuromuscular re-education; Manual; Home exercise program; Modalities; Therapeutic activities     Subjective Comments: Feeling a lot better this week; did have a little more Achilles pain with running this morning, completely alleviated following. Did not do a good warm-up before running and was outside in cold weather as well.       Initial:}     0/10Post Session:        0/10  Medications Last Reviewed:  3/20/2023  Updated Objective Findings:  PN 23    OBJECTIVE   Observation  Incision: healed     Girth measurements (R/L in cm)  Figure 8                       50.5/51     ROM (R/L in °) AROM(PROM)  Ankle DF                     15/15  CKC ankle
normal...

## 2023-03-22 ENCOUNTER — HOSPITAL ENCOUNTER (OUTPATIENT)
Dept: PHYSICAL THERAPY | Age: 23
Setting detail: RECURRING SERIES
Discharge: HOME | End: 2023-03-25
Payer: COMMERCIAL

## 2023-03-22 PROCEDURE — 97530 THERAPEUTIC ACTIVITIES: CPT

## 2023-03-22 PROCEDURE — 97016 VASOPNEUMATIC DEVICE THERAPY: CPT

## 2023-03-22 PROCEDURE — 97110 THERAPEUTIC EXERCISES: CPT

## 2023-03-22 NOTE — PROGRESS NOTES
progresses towards phase IV rehab. Communication/Consultation:  None today  Equipment provided today:  None  Recommendations/Intent for next treatment session: Next visit will focus on ROM, strengthening and neuromuscular re-education per Achilles repair protocol.     Total Treatment Billable Duration: 60 minutes  Time In: 0845  Time Out: 4111    Ophelia Hilliard, PT       Charge Capture  }Post Session Pain  PT Visit Info  [facility] Portal  MD Guidelines  Scanned Media  Benefits  MyChart    Future Appointments   Date Time Provider Ruslan Jiménez   3/24/2023  8:45 AM Morenita Banker, PT [facility]   3/27/2023  8:45 AM Morenita Banker, PT [facility]   3/29/2023  8:45 AM Morenita Banker, PT [facility]   3/31/2023  8:45 AM Morenita Banker, PT [facility]   4/26/2023 10:00 AM Estuardo Cohn III, MD [facility]   4/28/2023  8:45 AM Morenita Banker, PT [facility]

## 2023-03-24 ENCOUNTER — HOSPITAL ENCOUNTER (OUTPATIENT)
Dept: PHYSICAL THERAPY | Age: 23
Setting detail: RECURRING SERIES
Discharge: HOME | End: 2023-03-27
Payer: COMMERCIAL

## 2023-03-24 PROCEDURE — 97016 VASOPNEUMATIC DEVICE THERAPY: CPT

## 2023-03-24 PROCEDURE — 97530 THERAPEUTIC ACTIVITIES: CPT

## 2023-03-24 PROCEDURE — 97110 THERAPEUTIC EXERCISES: CPT

## 2023-03-24 NOTE — PROGRESS NOTES
Suzy Kan  : 2000  Primary: Charm Goldmann [facility])  Secondary: Seun Alvarado @ [address] [facility] [address]  Phone: [phone]  Fax: [phone] Plan Frequency: 2-3x per week    Plan of Care/Certification Expiration Date: 23      PT Visit Info:  Plan Frequency: 2-3x per week  Plan of Care/Certification Expiration Date: 23      Visit Count:  29    OUTPATIENT PHYSICAL THERAPY:OP NOTE TYPE: Treatment Note 3/24/2023       Episode  }Appt Desk             Treatment Diagnosis:  Difficulty in walking, Not elsewhere classified (R26.2)  Other abnormalities of gait and mobility (R26.89)  Medical/Referring Diagnosis:  Rupture of left Achilles tendon, subsequent encounter [S86.012D]  Acute post-operative pain [G89.18]  Referring Physician:  Ernestina Braun MD MD Orders:  PT Eval and Treat   Date of Onset:  Onset Date: 22     Allergies:   Patient has no known allergies. Restrictions/Precautions:  Restrictions/Precautions: Surgical Protocols  No data recorded   Interventions Planned (Treatment may consist of any combination of the following):    Current Treatment Recommendations: Strengthening; ROM; Balance training; Functional mobility training; ADL/Self-care training; Gait training; Stair training; Neuromuscular re-education; Manual; Home exercise program; Modalities; Therapeutic activities     Subjective Comments: Sore until Thursday morning after last visit, ran without pain on Wednesday, 4x3 min intervals.       Initial:}     0/10Post Session:        0/10  Medications Last Reviewed:  3/24/2023  Updated Objective Findings:  PN 23    OBJECTIVE   Observation  Incision: healed     Girth measurements (R/L in cm)  Figure 8                       50.5/51     ROM (R/L in °) AROM(PROM)  Ankle DF                     15/15  CKC ankle DF             48/40  Ankle PF                     55/50  Ankle eversion            30/30  Ankle inversion

## 2023-03-27 ENCOUNTER — HOSPITAL ENCOUNTER (OUTPATIENT)
Dept: PHYSICAL THERAPY | Age: 23
Setting detail: RECURRING SERIES
Discharge: HOME | End: 2023-03-30
Payer: COMMERCIAL

## 2023-03-27 PROCEDURE — 97016 VASOPNEUMATIC DEVICE THERAPY: CPT

## 2023-03-27 PROCEDURE — 97530 THERAPEUTIC ACTIVITIES: CPT

## 2023-03-27 NOTE — PROGRESS NOTES
range, repetitions and complexity of movement as indicated. MANUAL THERAPY: (see below for minutes): Joint mobilization and Soft tissue mobilization was utilized and necessary because of the patient's restricted joint motion, painful spasm, loss of articular motion and restricted motion of soft tissue. MODALITIES: (see below for minutes): to decrease pain   SELF CARE: (see below for minutes): Procedure(s) (per grid) utilized to improve and/or restore self-care/home management as related to dressing, bathing and grooming.  Required minimal verbal cueing to facilitate activities of daily living skills and compensatory activities    Date: 3/22/23  3/24/23  3/27/23    Modalities: 15 min 15 min 15 min   Gameready 15 min 15 min 15 min               Therapeutic Exercise: 20 min TA  25 min TE 25 min TA  20 min TE 40 min TA  5 min TE     Bike  5 min     Dynamic warm up: knee hug, hamstring, fwd/bckwd lunge Dynamic warm up: knee hug, hamstring, fwd/bckwd lunge     DL calf raise, toes on wedge  3x10 DL calf raise, toes on wedge  3x10 DL calf raise, toes on wedge  3x10    Sl calf raise, toes on wedge  3x10 SL calf raise, toes on wedge  3x10 SL calf raise to failure x1    DL calf raise  3x10 35# DL calf raise on wedge  3x10   35# Toe walk  2 laps fwd  2 laps lat  15#    Soleus raise  3x10  125# Toe walk  1 lap fwd  1 lap lat Hop testing, see above    Calf raise DL up, SL down  3x10  Soleus raise  3x10  125# Fwd jog/back pedal  1/2 baseline  x5    Fwd skipping  2 laps of 15m Staggered stance fwd/backward jumps  2x10 Fwd bounding  5x half basketball court     Lateral shuffle into back peddle  4 laps DL fwd jump  2 laps of 10m DL hop turns 2 laps half basketball court    Step up with only forefoot on box  3x10  20\" box SL hops:  2x5 SL  1x5 triple hop  2x 6m  2x 6m crossover     Ladder DLhop twists  2 laps   MOBO 2/4 body rotations  [address]     Reverse bosu lunge into march  3x10                       Proprioceptive Activities:

## 2023-03-29 ENCOUNTER — HOSPITAL ENCOUNTER (OUTPATIENT)
Dept: PHYSICAL THERAPY | Age: 23
Setting detail: RECURRING SERIES
Discharge: HOME | End: 2023-04-01
Payer: COMMERCIAL

## 2023-03-29 PROCEDURE — 97110 THERAPEUTIC EXERCISES: CPT

## 2023-03-29 PROCEDURE — 97530 THERAPEUTIC ACTIVITIES: CPT

## 2023-03-29 PROCEDURE — 97016 VASOPNEUMATIC DEVICE THERAPY: CPT

## 2023-03-29 NOTE — PROGRESS NOTES
Functional Activities:                                  Treatment/Session Summary:    Treatment Assessment: Focusing treatment on SL strengthening and balance along with DL into split stance plyometrics. Patient demonstrating increased heel height off ground with plyometrics with above therex. Communication/Consultation:  None today  Equipment provided today:  None  Recommendations/Intent for next treatment session: Next visit will focus on ROM, strengthening and neuromuscular re-education per Achilles repair protocol.     Total Treatment Billable Duration: 65 minutes  Time In: 1919  Time Out: 0640    VALENTIN OSWALD, PT       Charge Capture  }Post Session Pain  PT Visit Info  [facility] Portal  MD Guidelines  Scanned Media  Benefits  MyChart    Future Appointments   Date Time Provider Ruslan Jessy   3/31/2023  8:45 AM Willadean Macadamia, PT [facility]   4/3/2023  8:45 AM Willadean Macadamia, PT [facility]   4/5/2023  8:45 AM Willadean Macadamia, PT [facility]   4/7/2023  8:45 AM Willadean Macadamia, PT [facility]   4/10/2023  8:45 AM Willadean Macadamia, PT [facility]   4/11/2023  8:45 AM Willadean Macadamia, PT [facility]   4/14/2023  8:45 AM Willadean Macadamia, PT [facility]   4/17/2023  8:45 AM Willadean Macadamia, PT [facility]   4/19/2023  8:45 AM Willadean Macadamia, PT [facility]   4/21/2023  8:45 AM Willadean Macadamia, PT [facility]   4/24/2023  8:45 AM Willadean Macadamia, PT [facility]   4/26/2023 10:00 AM Sandy Alexander III, MD [facility]   4/27/2023  8:45 AM Willadean Macadamia, PT [facility]   4/28/2023  8:45 AM Toy Narayanan, PT [facility]   5/1/2023  8:45 AM Willadean Macadamia, PT [facility]   5/3/2023  8:45 AM Willadean Macadamia, PT [facility]   5/5/2023  8:45 AM Willadean Macagrahamia, PT [facility]   5/8/2023  8:45 AM Willadecolleen Guillenia, PT [facility]   5/10/2023  8:45 AM Willadean Macagrahamia, PT [facility]   5/12/2023  8:45 AM Willadecolleen Guillenia, PT [facility]   5/15/2023  8:45 AM Willcarol Lovell, PT [facility]   5/18/2023  8:45 AM Vitaly Villasenor

## 2023-03-31 ENCOUNTER — HOSPITAL ENCOUNTER (OUTPATIENT)
Dept: PHYSICAL THERAPY | Age: 23
Setting detail: RECURRING SERIES
End: 2023-03-31
Payer: COMMERCIAL

## 2023-03-31 PROCEDURE — 97110 THERAPEUTIC EXERCISES: CPT

## 2023-03-31 PROCEDURE — 97016 VASOPNEUMATIC DEVICE THERAPY: CPT

## 2023-03-31 PROCEDURE — 97530 THERAPEUTIC ACTIVITIES: CPT

## 2023-03-31 NOTE — PROGRESS NOTES
55/50  Ankle eversion            30/30  Ankle inversion           10/10        Strength (R/L)   Ankle DF                     5/5  Ankle PF                     5/4+ (SL HR R 30+ / L 20)  Ankle eversion            5/5  Ankle inversion           5 /5     Palpation  No warmth, no pitting edema    Functional testing:  Single hop (in)  94.75/73.75 (78%)  Triple hop (in)  239.25/216.5 (90.5%)  6m hop (avg seconds) 2.22/2.72 (81.6%)  Crossover (avg seconds) 2.66/2.72 (97.8%)        Outcome Measure: Tool Used: FOOT AND ANKLE ABILITY MEASURE  Score:  Initial: 55 Most Recent: 76 (Date:3/27/23   )      Tool Used: FAAM - Sport subscale  Score:  Initial: 0 Most Recent: 13 (Date: 3/27/23  )       Goals: (Goals have been discussed and agreed upon with patient.)  Short-Term Functional Goals: Time Frame: 2 weeks  Patient will be independent with HEP. (Complete)  Patient will decrease pain to < 2/10. (Complete)  Patient will decrease swelling to be symmetrical to contralateral side (complete)  Discharge Goals: Time Frame: 16 weeks  Patient will decrease ankle pain to 0/10 in order to complete full day of classes. (complete)  Patient will increase DF ROM to at least 15 in order to improve walking mechanics (ongoing)  Patient will increase ankle PF strength to 5/5 to improve ability to jump on L LE. (ongoing)  Treatment   TREATMENT:   THERAPEUTIC ACTIVITY: ( see below for minutes): Therapeutic activities per grid below to improve mobility, strength, balance and coordination. Required minimal visual, verbal, manual and tactile cues to improve independence and safety with daily activities . THERAPEUTIC EXERCISE: (see below for minutes): Exercises per grid below to improve mobility, strength, balance and coordination. Required minimal verbal and manual cues to promote proper body alignment, promote proper body posture and promote proper body mechanics.  Progressed resistance, range, repetitions and complexity of movement as hop turns 2 laps half basketball court Treadmill pushes  3x30\" DL box jump into DL box drop>DL fwd jump  3x5  20\" box    SL hops:  2x5 SL  1x5 triple hop  2x 6m  2x 6m crossover  Wall triple extension  3x5 Ladder drills: Fast feet into run  4 laps  Diagonal fwd into jog 4 laps      MOBO 2/4 body rotations  20 [address]  Wall alt hopping  2x10 Fwd bungee jogging in place  3x30\"      Air ex SL stance multi-directional cone taps  3x10       MOBO 2/4 pallof  2x10 ea direction  grn                  Proprioceptive Activities:                            Manual Therapy:                     Functional Activities:                                       Treatment/Session Summary:    Treatment Assessment: Patient able to maintain heel off ground more effectively with all therex today; does require consistent motivational/positive cues throughout session secondary to fear avoidance. Communication/Consultation:  None today  Equipment provided today:  None  Recommendations/Intent for next treatment session: Next visit will focus on ROM, strengthening and neuromuscular re-education per Achilles repair protocol.     Total Treatment Billable Duration: 55 minutes  Time In: 5243  Time Out: 0940    VALENTIN OSWALD, PT       Charge Capture  }Post Session Pain  PT Visit Info  [facility] Portal  MD Guidelines  Scanned Media  Benefits  MyChart    Future Appointments   Date Time Provider Ruslan Jiménez   4/3/2023  8:45 AM Avril Díaz, PT [facility]   4/5/2023  8:45 AM Avril Díaz, PT [facility]   4/7/2023  8:45 AM Avril Díaz, PT [facility]   4/10/2023  8:45 AM Avril Díaz, PT [facility]   4/11/2023  8:45 AM Avril Díaz, PT [facility]   4/14/2023  8:45 AM Avril Díaz, PT [facility]   4/17/2023  8:45 AM Avril Díaz, PT [facility]   4/19/2023  8:45 AM Avril Díaz, PT [facility]   4/21/2023  8:45 AM Avril Díaz, PT [facility]   4/24/2023  8:45 AM Avril Díaz, PT [facility]   4/26/2023 10:00 AM Mojgan Burleson

## 2023-04-03 ENCOUNTER — HOSPITAL ENCOUNTER (OUTPATIENT)
Dept: PHYSICAL THERAPY | Age: 23
Setting detail: RECURRING SERIES
Discharge: HOME | End: 2023-04-06
Payer: COMMERCIAL

## 2023-04-03 PROCEDURE — 97110 THERAPEUTIC EXERCISES: CPT

## 2023-04-03 PROCEDURE — 97140 MANUAL THERAPY 1/> REGIONS: CPT

## 2023-04-03 PROCEDURE — 97530 THERAPEUTIC ACTIVITIES: CPT

## 2023-04-03 NOTE — PROGRESS NOTES
Emma Rowell  : 2000  Primary: Joseluis Hollis [facility])  Secondary: Seun Alvarado @ [address] [facility] [address]  Phone: [phone]  Fax: [phone] Plan Frequency: 2-3x per week    Plan of Care/Certification Expiration Date: 23      PT Visit Info:  Plan Frequency: 2-3x per week  Plan of Care/Certification Expiration Date: 23      Visit Count:  33    OUTPATIENT PHYSICAL THERAPY:OP NOTE TYPE: Treatment Note 4/3/2023       Episode  }Appt Desk             Treatment Diagnosis:  Difficulty in walking, Not elsewhere classified (R26.2)  Other abnormalities of gait and mobility (R26.89)  Medical/Referring Diagnosis:  Rupture of left Achilles tendon, subsequent encounter [S86.012D]  Acute post-operative pain [G89.18]  Referring Physician:  Charlotte Rodrigez MD MD Orders:  PT Eval and Treat   Date of Onset:  Onset Date: 22     Allergies:   Patient has no known allergies. Restrictions/Precautions:  Restrictions/Precautions: Surgical Protocols  No data recorded   Interventions Planned (Treatment may consist of any combination of the following):    Current Treatment Recommendations: Strengthening; ROM; Balance training; Functional mobility training; ADL/Self-care training; Gait training; Stair training; Neuromuscular re-education; Manual; Home exercise program; Modalities; Therapeutic activities     Subjective Comments: Was sore for about 2.5 days after last visit.    Initial:}     0/10Post Session:        0/10  Medications Last Reviewed:  4/3/2023  Updated Objective Findings:  PN 3/27/23    OBJECTIVE   Observation  Incision: healed     Girth measurements (R/L in cm)  Figure 8                       50.5/51     ROM (R/L in °) AROM(PROM)  Ankle DF                     15/15  CKC ankle DF             50/55 (knee flexed)  CKC ankle DF   45/50  (knee extended)  Ankle PF                     55/50  Ankle eversion            30/30  Ankle inversion

## 2023-04-04 ENCOUNTER — APPOINTMENT (OUTPATIENT)
Dept: PHYSICAL THERAPY | Age: 23
End: 2023-04-04
Payer: COMMERCIAL

## 2023-04-05 ENCOUNTER — APPOINTMENT (OUTPATIENT)
Dept: PHYSICAL THERAPY | Age: 23
End: 2023-04-05
Payer: COMMERCIAL

## 2023-04-05 ENCOUNTER — HOSPITAL ENCOUNTER (OUTPATIENT)
Dept: PHYSICAL THERAPY | Age: 23
Setting detail: RECURRING SERIES
Discharge: HOME | End: 2023-04-08
Payer: COMMERCIAL

## 2023-04-05 PROCEDURE — 97110 THERAPEUTIC EXERCISES: CPT

## 2023-04-05 PROCEDURE — 97140 MANUAL THERAPY 1/> REGIONS: CPT

## 2023-04-05 PROCEDURE — 97530 THERAPEUTIC ACTIVITIES: CPT

## 2023-04-05 NOTE — PROGRESS NOTES
in/out     Fwd bungee jogging in place  3x30\" SL RDL on MOBO  3x10                              Proprioceptive Activities:                        Manual Therapy:   10 min      Lumbar CPA  Gr III  5 min      STM gastroc  5 min   Functional Activities:                                  Treatment/Session Summary:    Treatment Assessment: Continuing regressions of therex today due to increased soreness from running yesterday; discussed with patient HEP while she is away for break. Communication/Consultation:  None today  Equipment provided today:  None  Recommendations/Intent for next treatment session: Next visit will focus on ROM, strengthening and neuromuscular re-education per Achilles repair protocol.     Total Treatment Billable Duration: 60 minutes  Time In: 0845  Time Out: 0737    Melissa Hayes PT       Charge Capture  }Post Session Pain  PT Visit Info  [employer] Portal  MD Guidelines  Scanned Media  Benefits  [employer]    Future Appointments   Date Time Provider Ruslan Jiménez   4/7/2023  8:45 AM Floy Yony, PT [facility]   4/10/2023  8:45 AM Floy Yony, PT [facility]   4/12/2023  4:15 PM Floy Yony, PT [facility]   4/14/2023  8:45 AM Floy Yony, PT [facility]   4/17/2023  8:45 AM Floy Yony, PT [facility]   4/19/2023  8:45 AM Floy Yony, PT [facility]   4/21/2023  8:45 AM Floy Yony, PT [facility]   4/24/2023  8:45 AM Floy Yony, PT [facility]   4/26/2023  8:45 AM Floy Yony, PT [facility]   4/26/2023 10:00 AM Micheal Burdick III, MD [facility]   4/28/2023  8:45 AM Ree Brunner, PT [facility]   5/1/2023  8:45 AM Floy Yony, PT [facility]   5/3/2023  8:45 AM Floy Yony, PT [facility]   5/5/2023  8:45 AM Floy Yony, PT [facility]   5/8/2023  8:45 AM Floy Yony, PT [facility]   5/10/2023  8:45 AM Floy Yony, PT [facility]   5/12/2023  8:45 AM Litzy Bhakta, PT [facility]   5/15/2023  8:45 AM Litzy Bhakta, PT [facility]

## 2023-04-06 ENCOUNTER — APPOINTMENT (OUTPATIENT)
Dept: PHYSICAL THERAPY | Age: 23
End: 2023-04-06
Payer: COMMERCIAL

## 2023-04-07 ENCOUNTER — APPOINTMENT (OUTPATIENT)
Dept: PHYSICAL THERAPY | Age: 23
End: 2023-04-07
Payer: COMMERCIAL

## 2023-04-07 ENCOUNTER — HOSPITAL ENCOUNTER (OUTPATIENT)
Dept: PHYSICAL THERAPY | Age: 23
Setting detail: RECURRING SERIES
End: 2023-04-07
Payer: COMMERCIAL

## 2023-04-11 ENCOUNTER — APPOINTMENT (OUTPATIENT)
Dept: PHYSICAL THERAPY | Age: 23
End: 2023-04-11
Payer: COMMERCIAL

## 2023-04-13 ENCOUNTER — APPOINTMENT (OUTPATIENT)
Dept: PHYSICAL THERAPY | Age: 23
End: 2023-04-13
Payer: COMMERCIAL

## 2023-04-14 ENCOUNTER — HOSPITAL ENCOUNTER (OUTPATIENT)
Dept: PHYSICAL THERAPY | Age: 23
Setting detail: RECURRING SERIES
Discharge: HOME | End: 2023-04-17
Payer: COMMERCIAL

## 2023-04-14 ENCOUNTER — APPOINTMENT (OUTPATIENT)
Dept: PHYSICAL THERAPY | Age: 23
End: 2023-04-14
Payer: COMMERCIAL

## 2023-04-14 PROCEDURE — 97530 THERAPEUTIC ACTIVITIES: CPT

## 2023-04-14 PROCEDURE — 97140 MANUAL THERAPY 1/> REGIONS: CPT

## 2023-04-14 PROCEDURE — 97110 THERAPEUTIC EXERCISES: CPT

## 2023-04-17 ENCOUNTER — HOSPITAL ENCOUNTER (OUTPATIENT)
Dept: PHYSICAL THERAPY | Age: 23
Setting detail: RECURRING SERIES
Discharge: HOME | End: 2023-04-20
Payer: COMMERCIAL

## 2023-04-17 PROCEDURE — 97530 THERAPEUTIC ACTIVITIES: CPT

## 2023-04-17 PROCEDURE — 97140 MANUAL THERAPY 1/> REGIONS: CPT

## 2023-04-17 PROCEDURE — 97110 THERAPEUTIC EXERCISES: CPT

## 2023-04-17 PROCEDURE — 97016 VASOPNEUMATIC DEVICE THERAPY: CPT

## 2023-04-17 NOTE — PROGRESS NOTES
30/30  Ankle inversion           10/10        Strength (R/L)   Ankle DF                     5/5  Ankle PF                     5/4+ (SL HR R 30+ / L 20)  Ankle eversion            5/5  Ankle inversion           5 /5     Palpation  No warmth, no pitting edema    Functional testing:  Single hop (in)  94.75/73.75 (78%)  Triple hop (in)  239.25/216.5 (90.5%)  6m hop (avg seconds) 2.22/2.72 (81.6%)  Crossover (avg seconds) 2.66/2.72 (97.8%)        Outcome Measure: Tool Used: FOOT AND ANKLE ABILITY MEASURE  Score:  Initial: 55 Most Recent: 76 (Date:3/27/23   )      Tool Used: FAAM - Sport subscale  Score:  Initial: 0 Most Recent: 13 (Date: 3/27/23  )       Goals: (Goals have been discussed and agreed upon with patient.)  Short-Term Functional Goals: Time Frame: 2 weeks  Patient will be independent with HEP. (Complete)  Patient will decrease pain to < 2/10. (Complete)  Patient will decrease swelling to be symmetrical to contralateral side (complete)  Discharge Goals: Time Frame: 16 weeks  Patient will decrease ankle pain to 0/10 in order to complete full day of classes. (complete)  Patient will increase DF ROM to at least 15 in order to improve walking mechanics (ongoing)  Patient will increase ankle PF strength to 5/5 to improve ability to jump on L LE. (ongoing)  Treatment   TREATMENT:   THERAPEUTIC ACTIVITY: ( see below for minutes): Therapeutic activities per grid below to improve mobility, strength, balance and coordination. Required minimal visual, verbal, manual and tactile cues to improve independence and safety with daily activities . THERAPEUTIC EXERCISE: (see below for minutes): Exercises per grid below to improve mobility, strength, balance and coordination. Required minimal verbal and manual cues to promote proper body alignment, promote proper body posture and promote proper body mechanics. Progressed resistance, range, repetitions and complexity of movement as indicated.   MANUAL THERAPY: (see below for

## 2023-04-18 ENCOUNTER — APPOINTMENT (OUTPATIENT)
Dept: PHYSICAL THERAPY | Age: 23
End: 2023-04-18
Payer: COMMERCIAL

## 2023-04-19 ENCOUNTER — HOSPITAL ENCOUNTER (OUTPATIENT)
Dept: PHYSICAL THERAPY | Age: 23
Setting detail: RECURRING SERIES
Discharge: HOME | End: 2023-04-22
Payer: COMMERCIAL

## 2023-04-19 PROCEDURE — 97110 THERAPEUTIC EXERCISES: CPT

## 2023-04-19 PROCEDURE — 97140 MANUAL THERAPY 1/> REGIONS: CPT

## 2023-04-19 PROCEDURE — 97530 THERAPEUTIC ACTIVITIES: CPT

## 2023-04-19 NOTE — PROGRESS NOTES
Rubin Melendez  : 2000  Primary: [facility])  Secondary: Seun Alvarado @ [facility] [address] [place] [id number]  Phone: [phone]  Fax: [phone] Plan Frequency: 2-3x per week    Plan of Care/Certification Expiration Date: 23      PT Visit Info:  Plan Frequency: 2-3x per week  Plan of Care/Certification Expiration Date: 23      Visit Count:  38    OUTPATIENT PHYSICAL THERAPY:OP NOTE TYPE: Treatment Note 2023       Episode  }Appt Desk             Treatment Diagnosis:  Difficulty in walking, Not elsewhere classified (R26.2)  Other abnormalities of gait and mobility (R26.89)  Medical/Referring Diagnosis:  Rupture of left Achilles tendon, subsequent encounter [S86.012D]  Acute post-operative pain [G89.18]  Referring Physician:  Marj Luna MD MD Orders:  PT Eval and Treat   Date of Onset:  Onset Date: 22     Allergies:   Patient has no known allergies. Restrictions/Precautions:  Restrictions/Precautions: Surgical Protocols  No data recorded   Interventions Planned (Treatment may consist of any combination of the following):    Current Treatment Recommendations: Strengthening; ROM; Balance training; Functional mobility training; ADL/Self-care training; Gait training; Stair training; Neuromuscular re-education; Manual; Home exercise program; Modalities; Therapeutic activities     Subjective Comments: Normal soreness after last visit, improves throughout the next day.      Initial:}     0/10Post Session:        0/10  Medications Last Reviewed:  2023  Updated Objective Findings:  PN 3/27/23    OBJECTIVE   Observation  Incision: healed     Girth measurements (R/L in cm)  Figure 8                       50.5/51     ROM (R/L in °) AROM(PROM)  Ankle DF                     15/15  CKC ankle DF             50/55 (knee flexed)  CKC ankle DF   45/50  (knee extended)  Ankle PF                     55/50  Ankle eversion

## 2023-04-20 ENCOUNTER — APPOINTMENT (OUTPATIENT)
Dept: PHYSICAL THERAPY | Age: 23
End: 2023-04-20
Payer: COMMERCIAL

## 2023-04-21 ENCOUNTER — APPOINTMENT (OUTPATIENT)
Dept: PHYSICAL THERAPY | Age: 23
End: 2023-04-21
Payer: COMMERCIAL

## 2023-04-21 ENCOUNTER — HOSPITAL ENCOUNTER (OUTPATIENT)
Dept: PHYSICAL THERAPY | Age: 23
Setting detail: RECURRING SERIES
Discharge: HOME | End: 2023-04-24
Payer: COMMERCIAL

## 2023-04-21 PROCEDURE — 97016 VASOPNEUMATIC DEVICE THERAPY: CPT

## 2023-04-21 PROCEDURE — 97530 THERAPEUTIC ACTIVITIES: CPT

## 2023-04-21 PROCEDURE — 97110 THERAPEUTIC EXERCISES: CPT

## 2023-04-24 ENCOUNTER — HOSPITAL ENCOUNTER (OUTPATIENT)
Dept: PHYSICAL THERAPY | Age: 23
Setting detail: RECURRING SERIES
Discharge: HOME | End: 2023-04-27
Payer: COMMERCIAL

## 2023-04-24 PROCEDURE — 97016 VASOPNEUMATIC DEVICE THERAPY: CPT

## 2023-04-24 PROCEDURE — 97530 THERAPEUTIC ACTIVITIES: CPT

## 2023-04-24 PROCEDURE — 97110 THERAPEUTIC EXERCISES: CPT

## 2023-04-24 NOTE — PROGRESS NOTES
30/30  Ankle inversion           10/10        Strength (R/L)   Ankle DF                     5/5  Ankle PF                     5/4+ (SL HR R 30+ / L 20)  Ankle eversion            5/5  Ankle inversion           5 /5     Palpation  No warmth, no pitting edema    Functional testing:  Single hop (in)  94.75/73.75 (78%)  Triple hop (in)  239.25/216.5 (90.5%)  6m hop (avg seconds) 2.22/2.72 (81.6%)  Crossover (avg seconds) 2.66/2.72 (97.8%)      Hop Testing 4/24/23   Single hop (in)  70/66.25 (94%)  Triple hop (in)  212.75/200.5 (94%)  6m hop (avg seconds) 1.95/2.07 (94%)  Crossover (avg seconds) 2.42/2.51 (96%)    Outcome Measure: Tool Used: FOOT AND ANKLE ABILITY MEASURE  Score:  Initial: 55 Most Recent: 77 (Date: 4/24/23    )      Tool Used: FAAM - Sport subscale  Score:  Initial: 0 Most Recent: 14 (Date: 4/24/23   )       Goals: (Goals have been discussed and agreed upon with patient.)  Short-Term Functional Goals: Time Frame: 2 weeks  Patient will be independent with HEP. (Complete)  Patient will decrease pain to < 2/10. (Complete)  Patient will decrease swelling to be symmetrical to contralateral side (complete)  Discharge Goals: Time Frame: 16 weeks  Patient will decrease ankle pain to 0/10 in order to complete full day of classes. (complete)  Patient will increase DF ROM to at least 15 in order to improve walking mechanics (complete)  Patient will increase ankle PF strength to 5/5 to improve ability to jump on L LE. (ongoing)  Treatment   TREATMENT:   THERAPEUTIC ACTIVITY: ( see below for minutes): Therapeutic activities per grid below to improve mobility, strength, balance and coordination. Required minimal visual, verbal, manual and tactile cues to improve independence and safety with daily activities . THERAPEUTIC EXERCISE: (see below for minutes): Exercises per grid below to improve mobility, strength, balance and coordination.  Required minimal verbal and manual cues to promote proper body

## 2023-04-25 ENCOUNTER — APPOINTMENT (OUTPATIENT)
Dept: PHYSICAL THERAPY | Age: 23
End: 2023-04-25
Payer: COMMERCIAL

## 2023-04-26 ENCOUNTER — OFFICE VISIT (OUTPATIENT)
Dept: ORTHOPEDIC SURGERY | Age: 23
End: 2023-04-26
Payer: COMMERCIAL

## 2023-04-26 ENCOUNTER — HOSPITAL ENCOUNTER (OUTPATIENT)
Dept: PHYSICAL THERAPY | Age: 23
Setting detail: RECURRING SERIES
Discharge: HOME | End: 2023-04-29
Payer: COMMERCIAL

## 2023-04-26 DIAGNOSIS — S86.012D RUPTURE OF LEFT ACHILLES TENDON, SUBSEQUENT ENCOUNTER: Primary | ICD-10-CM

## 2023-04-26 PROCEDURE — 97530 THERAPEUTIC ACTIVITIES: CPT

## 2023-04-26 PROCEDURE — 97016 VASOPNEUMATIC DEVICE THERAPY: CPT

## 2023-04-26 PROCEDURE — 97110 THERAPEUTIC EXERCISES: CPT

## 2023-04-26 PROCEDURE — 99213 OFFICE O/P EST LOW 20 MIN: CPT | Performed by: ORTHOPAEDIC SURGERY

## 2023-04-26 NOTE — PROGRESS NOTES
Yesikaduane Tsai  : 2000  Primary: [facility])  Secondary: Seun Alvarado @ [facility] [address] [place] [id number]  Phone: [phone]  Fax: [phone] Plan Frequency: 2-3x per week    Plan of Care/Certification Expiration Date: 23      PT Visit Info:  Plan Frequency: 2-3x per week  Plan of Care/Certification Expiration Date: 23      Visit Count:  41    OUTPATIENT PHYSICAL THERAPY:OP NOTE TYPE: Treatment Note 2023       Episode  }Appt Desk             Treatment Diagnosis:  Difficulty in walking, Not elsewhere classified (R26.2)  Other abnormalities of gait and mobility (R26.89)  Medical/Referring Diagnosis:  Rupture of left Achilles tendon, subsequent encounter [S86.012D]  Acute post-operative pain [G89.18]  Referring Physician:  Jamshid Aaron MD MD Orders:  PT Eval and Treat   Date of Onset:  Onset Date: 22     Allergies:   Patient has no known allergies. Restrictions/Precautions:  Restrictions/Precautions: Surgical Protocols  No data recorded   Interventions Planned (Treatment may consist of any combination of the following):    Current Treatment Recommendations: Strengthening; ROM; Balance training; Functional mobility training; ADL/Self-care training; Gait training; Stair training; Neuromuscular re-education; Manual; Home exercise program; Modalities; Therapeutic activities     Subjective Comments: Ran 2x10 min yesterday while feeling good. Sees MD for f/u right after PT today.      Initial:}     0/10Post Session:        0/10  Medications Last Reviewed:  2023  Updated Objective Findings:  PN 3/27/23    OBJECTIVE   Observation  Incision: healed     Girth measurements (R/L in cm)  Figure 8                       50.5/51     ROM (R/L in °) AROM(PROM)  Ankle DF                     15/15  CKC ankle DF             50/55 (knee flexed)  CKC ankle DF   45/50  (knee extended)  Ankle PF                     55/50  Ankle eversion

## 2023-04-26 NOTE — PROGRESS NOTES
Name: Emma Rowell  YOB: 2000  Gender: female  MRN: [id number]    Summary:   Left Achilles tear       CC: Ankle Pain (Ankle Pain (Procedure Performed:Left achilles primary reconstruction - Left Date of Procedure: 11/1/2022)       HPI: Emma Rowell is a 25 y.o. female who presents with Ankle Pain (Ankle Pain (Procedure Performed:Left achilles primary reconstruction - Left Date of Procedure: 11/1/2022)  . This patient presents back to the office today about 5-1/2 months out from her left Achilles tendon reconstruction. She is making good progress and started sport specific training. She is nearly done with her physical therapy. History was obtained by Patient     ROS/Meds/PSH/PMH/FH/SH: I personally reviewed the patients standard intake form. Below are the pertinents    Tobacco:  reports that she has never smoked. She has never used smokeless tobacco.  Diabetes: None      Physical Examination:    The repair is intact. She has good range of motion. She does continue to have some calf atrophy on the left. She can do a single-leg toe rise on the side without much difficulty now. Imaging:   No imaging reviewed           Carloz Rea III, MD           Assessment:   Left Achilles tendon reconstruction    Treatment Plan:   4 This is a chronic illness/condition with exacerbation and progression  Treatment at this time: Time with no intervention  Studies ordered: NO XR needed @ Next Visit    Weight-bearing status: WBAT        Return to work/work restrictions: none  No medications given    She will continue advance her activities of sport specific training and return in 3 months before she starts back in the fall season.

## 2023-04-27 ENCOUNTER — APPOINTMENT (OUTPATIENT)
Dept: PHYSICAL THERAPY | Age: 23
End: 2023-04-27
Payer: COMMERCIAL

## 2023-04-28 ENCOUNTER — HOSPITAL ENCOUNTER (OUTPATIENT)
Dept: PHYSICAL THERAPY | Age: 23
Setting detail: RECURRING SERIES
Discharge: HOME | End: 2023-05-01
Payer: COMMERCIAL

## 2023-04-28 PROCEDURE — 97016 VASOPNEUMATIC DEVICE THERAPY: CPT

## 2023-04-28 PROCEDURE — 97110 THERAPEUTIC EXERCISES: CPT

## 2023-04-28 PROCEDURE — 97530 THERAPEUTIC ACTIVITIES: CPT

## 2024-04-10 ENCOUNTER — APPOINTMENT (RX ONLY)
Dept: URBAN - METROPOLITAN AREA CLINIC 45 | Facility: CLINIC | Age: 24
Setting detail: DERMATOLOGY
End: 2024-04-10

## 2024-04-10 DIAGNOSIS — L70.0 ACNE VULGARIS: ICD-10-CM | Status: WELL CONTROLLED

## 2024-04-10 DIAGNOSIS — L30.0 NUMMULAR DERMATITIS: ICD-10-CM | Status: INADEQUATELY CONTROLLED

## 2024-04-10 PROBLEM — L30.9 DERMATITIS, UNSPECIFIED: Status: ACTIVE | Noted: 2024-04-10

## 2024-04-10 PROCEDURE — ? COUNSELING

## 2024-04-10 PROCEDURE — ? MEDICATION COUNSELING

## 2024-04-10 PROCEDURE — ? PRESCRIPTION

## 2024-04-10 PROCEDURE — 99213 OFFICE O/P EST LOW 20 MIN: CPT

## 2024-04-10 PROCEDURE — ? KOH PREP

## 2024-04-10 RX ORDER — HYDROQUINONE 4 %
CREAM (GRAM) TOPICAL
Qty: 30 | Refills: 2 | Status: ERX | COMMUNITY
Start: 2024-04-10

## 2024-04-10 RX ORDER — CLOBETASOL PROPIONATE 0.5 MG/G
OINTMENT TOPICAL BID
Qty: 60 | Refills: 1 | Status: ERX | COMMUNITY
Start: 2024-04-10

## 2024-04-10 RX ORDER — AZELAIC ACID 0.15 G/G
GEL TOPICAL
Qty: 50 | Refills: 8 | Status: ERX

## 2024-04-10 RX ORDER — TRETIONIN 0.5 MG/G
CREAM TOPICAL
Qty: 45 | Refills: 4 | Status: ERX

## 2024-04-10 RX ADMIN — Medication: at 00:00

## 2024-04-10 RX ADMIN — CLOBETASOL PROPIONATE: 0.5 OINTMENT TOPICAL at 00:00

## 2024-04-10 ASSESSMENT — LOCATION SIMPLE DESCRIPTION DERM
LOCATION SIMPLE: LEFT PRETIBIAL REGION
LOCATION SIMPLE: LEFT CHEEK

## 2024-04-10 ASSESSMENT — LOCATION ZONE DERM
LOCATION ZONE: LEG
LOCATION ZONE: FACE

## 2024-04-10 ASSESSMENT — LOCATION DETAILED DESCRIPTION DERM
LOCATION DETAILED: LEFT INFERIOR CENTRAL MALAR CHEEK
LOCATION DETAILED: LEFT PROXIMAL PRETIBIAL REGION

## 2024-04-10 NOTE — PROCEDURE: MEDICATION COUNSELING
Prednisone Pregnancy And Lactation Text: This medication is Pregnancy Category C and it isn't know if it is safe during pregnancy. This medication is excreted in breast milk.
Rinvoq Pregnancy And Lactation Text: Based on animal studies, Rinvoq may cause embryo-fetal harm when administered to pregnant women.  The medication should not be used in pregnancy.  Breastfeeding is not recommended during treatment and for 6 days after the last dose.
Drysol Pregnancy And Lactation Text: This medication is considered safe during pregnancy and breast feeding.
Hydroxychloroquine Pregnancy And Lactation Text: This medication has been shown to cause fetal harm but it isn't assigned a Pregnancy Risk Category. There are small amounts excreted in breast milk.
Colchicine Counseling:  Patient counseled regarding adverse effects including but not limited to stomach upset (nausea, vomiting, stomach pain, or diarrhea).  Patient instructed to limit alcohol consumption while taking this medication.  Colchicine may reduce blood counts especially with prolonged use.  The patient understands that monitoring of kidney function and blood counts may be required, especially at baseline. The patient verbalized understanding of the proper use and possible adverse effects of colchicine.  All of the patient's questions and concerns were addressed.
Siliq Counseling:  I discussed with the patient the risks of Siliq including but not limited to new or worsening depression, suicidal thoughts and behavior, immunosuppression, malignancy, posterior leukoencephalopathy syndrome, and serious infections.  The patient understands that monitoring is required including a PPD at baseline and must alert us or the primary physician if symptoms of infection or other concerning signs are noted. There is also a special program designed to monitor depression which is required with Siliq.
Benzoyl Peroxide Counseling: Patient counseled that medicine may cause skin irritation and bleach clothing.  In the event of skin irritation, the patient was advised to reduce the amount of the drug applied or use it less frequently.   The patient verbalized understanding of the proper use and possible adverse effects of benzoyl peroxide.  All of the patient's questions and concerns were addressed.
Zyclara Counseling:  I discussed with the patient the risks of imiquimod including but not limited to erythema, scaling, itching, weeping, crusting, and pain.  Patient understands that the inflammatory response to imiquimod is variable from person to person and was educated regarded proper titration schedule.  If flu-like symptoms develop, patient knows to discontinue the medication and contact us.
Clindamycin Pregnancy And Lactation Text: This medication can be used in pregnancy if certain situations. Clindamycin is also present in breast milk.
Detail Level: Simple
Terbinafine Pregnancy And Lactation Text: This medication is Pregnancy Category B and is considered safe during pregnancy. It is also excreted in breast milk and breast feeding isn't recommended.
Bexarotene Pregnancy And Lactation Text: This medication is Pregnancy Category X and should not be given to women who are pregnant or may become pregnant. This medication should not be used if you are breast feeding.
Topical Ketoconazole Pregnancy And Lactation Text: This medication is Pregnancy Category B and is considered safe during pregnancy. It is unknown if it is excreted in breast milk.
Picato Pregnancy And Lactation Text: This medication is Pregnancy Category C. It is unknown if this medication is excreted in breast milk.
Fluconazole Counseling:  Patient counseled regarding adverse effects of fluconazole including but not limited to headache, diarrhea, nausea, upset stomach, liver function test abnormalities, taste disturbance, and stomach pain.  There is a rare possibility of liver failure that can occur when taking fluconazole.  The patient understands that monitoring of LFTs and kidney function test may be required, especially at baseline. The patient verbalized understanding of the proper use and possible adverse effects of fluconazole.  All of the patient's questions and concerns were addressed.
Stelara Pregnancy And Lactation Text: This medication is Pregnancy Category B and is considered safe during pregnancy. It is unknown if this medication is excreted in breast milk.
Soolantra Counseling: I discussed with the patients the risks of topial Soolantra. This is a medicine which decreases the number of mites and inflammation in the skin. You experience burning, stinging, eye irritation or allergic reactions.  Please call our office if you develop any problems from using this medication.
Klisyri Counseling:  I discussed with the patient the risks of Klisyri including but not limited to erythema, scaling, itching, weeping, crusting, and pain.
Cellcept Counseling:  I discussed with the patient the risks of mycophenolate mofetil including but not limited to infection/immunosuppression, GI upset, hypokalemia, hypercholesterolemia, bone marrow suppression, lymphoproliferative disorders, malignancy, GI ulceration/bleed/perforation, colitis, interstitial lung disease, kidney failure, progressive multifocal leukoencephalopathy, and birth defects.  The patient understands that monitoring is required including a baseline creatinine and regular CBC testing. In addition, patient must alert us immediately if symptoms of infection or other concerning signs are noted.
Cimzia Pregnancy And Lactation Text: This medication crosses the placenta but can be considered safe in certain situations. Cimzia may be excreted in breast milk.
Hyrimoz Counseling:  I discussed with the patient the risks of adalimumab including but not limited to myelosuppression, immunosuppression, autoimmune hepatitis, demyelinating diseases, lymphoma, and serious infections.  The patient understands that monitoring is required including a PPD at baseline and must alert us or the primary physician if symptoms of infection or other concerning signs are noted.
Winlevi Counseling:  I discussed with the patient the risks of topical clascoterone including but not limited to erythema, scaling, itching, and stinging. Patient voiced their understanding.
Albendazole Pregnancy And Lactation Text: This medication is Pregnancy Category C and it isn't known if it is safe during pregnancy. It is also excreted in breast milk.
Quinolones Counseling:  I discussed with the patient the risks of fluoroquinolones including but not limited to GI upset, allergic reaction, drug rash, diarrhea, dizziness, photosensitivity, yeast infections, liver function test abnormalities, tendonitis/tendon rupture.
Isotretinoin Counseling: Patient should get monthly blood tests, not donate blood, not drive at night if vision affected, not share medication, and not undergo elective surgery for 6 months after tx completed. Side effects reviewed, pt to contact office should one occur.
Sski Pregnancy And Lactation Text: This medication is Pregnancy Category D and isn't considered safe during pregnancy. It is excreted in breast milk.
Libtayo Counseling- I discussed with the patient the risks of Libtayo including but not limited to nausea, vomiting, diarrhea, and bone or muscle pain.  The patient verbalized understanding of the proper use and possible adverse effects of Libtayo.  All of the patient's questions and concerns were addressed.
Oral Minoxidil Counseling- I discussed with the patient the risks of oral minoxidil including but not limited to shortness of breath, swelling of the feet or ankles, dizziness, lightheadedness, unwanted hair growth and allergic reaction.  The patient verbalized understanding of the proper use and possible adverse effects of oral minoxidil.  All of the patient's questions and concerns were addressed.
Colchicine Pregnancy And Lactation Text: This medication is Pregnancy Category C and isn't considered safe during pregnancy. It is excreted in breast milk.
Finasteride Female Counseling: Finasteride Counseling:  I discussed with the patient the risks of use of finasteride including but not limited to decreased libido and sexual dysfunction. Explained the teratogenic nature of the medication and stressed the importance of not getting pregnant during treatment. All of the patient's questions and concerns were addressed.
Protopic Counseling: Patient may experience a mild burning sensation during topical application. Protopic is not approved in children less than 2 years of age. There have been case reports of hematologic and skin malignancies in patients using topical calcineurin inhibitors although causality is questionable.
Soolantra Pregnancy And Lactation Text: This medication is Pregnancy Category C. This medication is considered safe during breast feeding.
Low Dose Naltrexone Counseling- I discussed with the patient the potential risks and side effects of low dose naltrexone including but not limited to: more vivid dreams, headaches, nausea, vomiting, abdominal pain, fatigue, dizziness, and anxiety.
Fluconazole Pregnancy And Lactation Text: This medication is Pregnancy Category C and it isn't know if it is safe during pregnancy. It is also excreted in breast milk.
Elidel Counseling: Patient may experience a mild burning sensation during topical application. Elidel is not approved in children less than 2 years of age. There have been case reports of hematologic and skin malignancies in patients using topical calcineurin inhibitors although causality is questionable.
Klisyri Pregnancy And Lactation Text: It is unknown if this medication can harm a developing fetus or if it is excreted in breast milk.
Doxycycline Counseling:  Patient counseled regarding possible photosensitivity and increased risk for sunburn.  Patient instructed to avoid sunlight, if possible.  When exposed to sunlight, patients should wear protective clothing, sunglasses, and sunscreen.  The patient was instructed to call the office immediately if the following severe adverse effects occur:  hearing changes, easy bruising/bleeding, severe headache, or vision changes.  The patient verbalized understanding of the proper use and possible adverse effects of doxycycline.  All of the patient's questions and concerns were addressed.
Finasteride Pregnancy And Lactation Text: This medication is absolutely contraindicated during pregnancy. It is unknown if it is excreted in breast milk.
Benzoyl Peroxide Pregnancy And Lactation Text: This medication is Pregnancy Category C. It is unknown if benzoyl peroxide is excreted in breast milk.
Include Pregnancy/Lactation Warning?: No
Cosentyx Counseling:  I discussed with the patient the risks of Cosentyx including but not limited to worsening of Crohn's disease, immunosuppression, allergic reactions and infections.  The patient understands that monitoring is required including a PPD at baseline and must alert us or the primary physician if symptoms of infection or other concerning signs are noted.
Winlevi Pregnancy And Lactation Text: This medication is considered safe during pregnancy and breastfeeding.
Cellcept Pregnancy And Lactation Text: This medication is Pregnancy Category D and isn't considered safe during pregnancy. It is unknown if this medication is excreted in breast milk.
Topical Metronidazole Counseling: Metronidazole is a topical antibiotic medication. You may experience burning, stinging, redness, or allergic reactions.  Please call our office if you develop any problems from using this medication.
Sotyktu Counseling:  I discussed the most common side effects of Sotyktu including: common cold, sore throat, sinus infections, cold sores, canker sores, folliculitis, and acne.  I also discussed more serious side effects of Sotyktu including but not limited to: serious allergic reactions; increased risk for infections such as TB; cancers such as lymphomas; rhabdomyolysis and elevated CPK; and elevated triglycerides and liver enzymes. 
Taltz Counseling: I discussed with the patient the risks of ixekizumab including but not limited to immunosuppression, serious infections, worsening of inflammatory bowel disease and drug reactions.  The patient understands that monitoring is required including a PPD at baseline and must alert us or the primary physician if symptoms of infection or other concerning signs are noted.
Thalidomide Counseling: I discussed with the patient the risks of thalidomide including but not limited to birth defects, anxiety, weakness, chest pain, dizziness, cough and severe allergy.
Ivermectin Counseling:  Patient instructed to take medication on an empty stomach with a full glass of water.  Patient informed of potential adverse effects including but not limited to nausea, diarrhea, dizziness, itching, and swelling of the extremities or lymph nodes.  The patient verbalized understanding of the proper use and possible adverse effects of ivermectin.  All of the patient's questions and concerns were addressed.
Isotretinoin Pregnancy And Lactation Text: This medication is Pregnancy Category X and is considered extremely dangerous during pregnancy. It is unknown if it is excreted in breast milk.
Dapsone Counseling: I discussed with the patient the risks of dapsone including but not limited to hemolytic anemia, agranulocytosis, rashes, methemoglobinemia, kidney failure, peripheral neuropathy, headaches, GI upset, and liver toxicity.  Patients who start dapsone require monitoring including baseline LFTs and weekly CBCs for the first month, then every month thereafter.  The patient verbalized understanding of the proper use and possible adverse effects of dapsone.  All of the patient's questions and concerns were addressed.
Oral Minoxidil Pregnancy And Lactation Text: This medication should only be used when clearly needed if you are pregnant, attempting to become pregnant or breast feeding.
Doxycycline Pregnancy And Lactation Text: This medication is Pregnancy Category D and not consider safe during pregnancy. It is also excreted in breast milk but is considered safe for shorter treatment courses.
Cimetidine Counseling:  I discussed with the patient the risks of Cimetidine including but not limited to gynecomastia, headache, diarrhea, nausea, drowsiness, arrhythmias, pancreatitis, skin rashes, psychosis, bone marrow suppression and kidney toxicity.
Carac Counseling:  I discussed with the patient the risks of Carac including but not limited to erythema, scaling, itching, weeping, crusting, and pain.
Libtayo Pregnancy And Lactation Text: This medication is contraindicated in pregnancy and when breast feeding.
Cibinqo Counseling: I discussed with the patient the risks of Cibinqo therapy including but not limited to common cold, nausea, headache, cold sores, increased blood CPK levels, dizziness, UTIs, fatigue, acne, and vomitting. Live vaccines should be avoided.  This medication has been linked to serious infections; higher rate of mortality; malignancy and lymphoproliferative disorders; major adverse cardiovascular events; thrombosis; thrombocytopenia and lymphopenia; lipid elevations; and retinal detachment.
Topical Retinoid counseling:  Patient advised to apply a pea-sized amount only at bedtime and wait 30 minutes after washing their face before applying.  If too drying, patient may add a non-comedogenic moisturizer. The patient verbalized understanding of the proper use and possible adverse effects of retinoids.  All of the patient's questions and concerns were addressed.
Topical Metronidazole Pregnancy And Lactation Text: This medication is Pregnancy Category B and considered safe during pregnancy.  It is also considered safe to use while breastfeeding.
Taltz Pregnancy And Lactation Text: The risk during pregnancy and breastfeeding is uncertain with this medication.
Minoxidil Counseling: Minoxidil is a topical medication which can increase blood flow where it is applied. It is uncertain how this medication increases hair growth. Side effects are uncommon and include stinging and allergic reactions.
Protopic Pregnancy And Lactation Text: This medication is Pregnancy Category C. It is unknown if this medication is excreted in breast milk when applied topically.
Otezla Counseling: The side effects of Otezla were discussed with the patient, including but not limited to worsening or new depression, weight loss, diarrhea, nausea, upper respiratory tract infection, and headache. Patient instructed to call the office should any adverse effect occur.  The patient verbalized understanding of the proper use and possible adverse effects of Otezla.  All the patient's questions and concerns were addressed.
Griseofulvin Counseling:  I discussed with the patient the risks of griseofulvin including but not limited to photosensitivity, cytopenia, liver damage, nausea/vomiting and severe allergy.  The patient understands that this medication is best absorbed when taken with a fatty meal (e.g., ice cream or french fries).
Birth Control Pills Counseling: Birth Control Pill Counseling: I discussed with the patient the potential side effects of OCPs including but not limited to increased risk of stroke, heart attack, thrombophlebitis, deep venous thrombosis, hepatic adenomas, breast changes, GI upset, headaches, and depression.  The patient verbalized understanding of the proper use and possible adverse effects of OCPs. All of the patient's questions and concerns were addressed.
Rifampin Counseling: I discussed with the patient the risks of rifampin including but not limited to liver damage, kidney damage, red-orange body fluids, nausea/vomiting and severe allergy.
Cyclophosphamide Counseling:  I discussed with the patient the risks of cyclophosphamide including but not limited to hair loss, hormonal abnormalities, decreased fertility, abdominal pain, diarrhea, nausea and vomiting, bone marrow suppression and infection. The patient understands that monitoring is required while taking this medication.
Sotyktu Pregnancy And Lactation Text: There is insufficient data to evaluate whether or not Sotyktu is safe to use during pregnancy.   It is not known if Sotyktu passes into breast milk and whether or not it is safe to use when breastfeeding.  
Opioid Counseling: I discussed with the patient the potential side effects of opioids including but not limited to addiction, altered mental status, and depression. I stressed avoiding alcohol, benzodiazepines, muscle relaxants and sleep aids unless specifically okayed by a physician. The patient verbalized understanding of the proper use and possible adverse effects of opioids. All of the patient's questions and concerns were addressed. They were instructed to flush the remaining pills down the toilet if they did not need them for pain.
Low Dose Naltrexone Pregnancy And Lactation Text: Naltrexone is pregnancy category C.  There have been no adequate and well-controlled studies in pregnant women.  It should be used in pregnancy only if the potential benefit justifies the potential risk to the fetus.   Limited data indicates that naltrexone is minimally excreted into breastmilk.
VTAMA Counseling: I discussed with the patient that VTAMA is not for use in the eyes, mouth or mouth. They should call the office if they develop any signs of allergic reactions to VTAMA. The patient verbalized understanding of the proper use and possible adverse effects of VTAMA.  All of the patient's questions and concerns were addressed.
High Dose Vitamin A Counseling: Side effects reviewed, pt to contact office should one occur.
Odomzo Counseling- I discussed with the patient the risks of Odomzo including but not limited to nausea, vomiting, diarrhea, constipation, weight loss, changes in the sense of taste, decreased appetite, muscle spasms, and hair loss.  The patient verbalized understanding of the proper use and possible adverse effects of Odomzo.  All of the patient's questions and concerns were addressed.
Thalidomide Pregnancy And Lactation Text: This medication is Pregnancy Category X and is absolutely contraindicated during pregnancy. It is unknown if it is excreted in breast milk.
Carac Pregnancy And Lactation Text: This medication is Pregnancy Category X and contraindicated in pregnancy and in women who may become pregnant. It is unknown if this medication is excreted in breast milk.
Cyclophosphamide Pregnancy And Lactation Text: This medication is Pregnancy Category D and it isn't considered safe during pregnancy. This medication is excreted in breast milk.
Xeljanz Counseling: I discussed with the patient the risks of Xeljanz therapy including increased risk of infection, liver issues, headache, diarrhea, or cold symptoms. Live vaccines should be avoided. They were instructed to call if they have any problems.
Ilumya Counseling: I discussed with the patient the risks of tildrakizumab including but not limited to immunosuppression, malignancy, posterior leukoencephalopathy syndrome, and serious infections.  The patient understands that monitoring is required including a PPD at baseline and must alert us or the primary physician if symptoms of infection or other concerning signs are noted.
Minoxidil Pregnancy And Lactation Text: This medication has not been assigned a Pregnancy Risk Category but animal studies failed to show danger with the topical medication. It is unknown if the medication is excreted in breast milk.
Erythromycin Counseling:  I discussed with the patient the risks of erythromycin including but not limited to GI upset, allergic reaction, drug rash, diarrhea, increase in liver enzymes, and yeast infections.
Qbrexza Counseling:  I discussed with the patient the risks of Qbrexza including but not limited to headache, mydriasis, blurred vision, dry eyes, nasal dryness, dry mouth, dry throat, dry skin, urinary hesitation, and constipation.  Local skin reactions including erythema, burning, stinging, and itching can also occur.
Cibinqo Pregnancy And Lactation Text: It is unknown if this medication will adversely affect pregnancy or breast feeding.  You should not take this medication if you are currently pregnant or planning a pregnancy or while breastfeeding.
Eucrisa Counseling: Patient may experience a mild burning sensation during topical application. Eucrisa is not approved in children less than 2 years of age.
Dapsone Pregnancy And Lactation Text: This medication is Pregnancy Category C and is not considered safe during pregnancy or breast feeding.
Rifampin Pregnancy And Lactation Text: This medication is Pregnancy Category C and it isn't know if it is safe during pregnancy. It is also excreted in breast milk and should not be used if you are breast feeding.
Birth Control Pills Pregnancy And Lactation Text: This medication should be avoided if pregnant and for the first 30 days post-partum.
Opioid Pregnancy And Lactation Text: These medications can lead to premature delivery and should be avoided during pregnancy. These medications are also present in breast milk in small amounts.
Topical Steroids Counseling: I discussed with the patient that prolonged use of topical steroids can result in the increased appearance of superficial blood vessels (telangiectasias), lightening (hypopigmentation) and thinning of the skin (atrophy).  Patient understands to avoid using high potency steroids in skin folds, the groin or the face.  The patient verbalized understanding of the proper use and possible adverse effects of topical steroids.  All of the patient's questions and concerns were addressed.
Otezla Pregnancy And Lactation Text: This medication is Pregnancy Category C and it isn't known if it is safe during pregnancy. It is unknown if it is excreted in breast milk.
Griseofulvin Pregnancy And Lactation Text: This medication is Pregnancy Category X and is known to cause serious birth defects. It is unknown if this medication is excreted in breast milk but breast feeding should be avoided.
Tremfya Counseling: I discussed with the patient the risks of guselkumab including but not limited to immunosuppression, serious infections, worsening of inflammatory bowel disease and drug reactions.  The patient understands that monitoring is required including a PPD at baseline and must alert us or the primary physician if symptoms of infection or other concerning signs are noted.
Dupixent Counseling: I discussed with the patient the risks of dupilumab including but not limited to eye infection and irritation, cold sores, injection site reactions, worsening of asthma, allergic reactions and increased risk of parasitic infection.  Live vaccines should be avoided while taking dupilumab. Dupilumab will also interact with certain medications such as warfarin and cyclosporine. The patient understands that monitoring is required and they must alert us or the primary physician if symptoms of infection or other concerning signs are noted.
Vtama Pregnancy And Lactation Text: It is unknown if this medication can cause problems during pregnancy and breastfeeding.
Niacinamide Counseling: I recommended taking niacin or niacinamide, also know as vitamin B3, twice daily. Recent evidence suggests that taking vitamin B3 (500 mg twice daily) can reduce the risk of actinic keratoses and non-melanoma skin cancers. Side effects of vitamin B3 include flushing and headache.
Calcipotriene Counseling:  I discussed with the patient the risks of calcipotriene including but not limited to erythema, scaling, itching, and irritation.
Doxepin Counseling:  Patient advised that the medication is sedating and not to drive a car after taking this medication. Patient informed of potential adverse effects including but not limited to dry mouth, urinary retention, and blurry vision.  The patient verbalized understanding of the proper use and possible adverse effects of doxepin.  All of the patient's questions and concerns were addressed.
Cyclosporine Counseling:  I discussed with the patient the risks of cyclosporine including but not limited to hypertension, gingival hyperplasia,myelosuppression, immunosuppression, liver damage, kidney damage, neurotoxicity, lymphoma, and serious infections. The patient understands that monitoring is required including baseline blood pressure, CBC, CMP, lipid panel and uric acid, and then 1-2 times monthly CMP and blood pressure.
Xelclaudiaz Pregnancy And Lactation Text: This medication is Pregnancy Category D and is not considered safe during pregnancy.  The risk during breast feeding is also uncertain.
Litfulo Counseling: I discussed with the patient the risks of Litfulo therapy including but not limited to upper respiratory tract infections, shingles, cold sores, and nausea. Live vaccines should be avoided.  This medication has been linked to serious infections; higher rate of mortality; malignancy and lymphoproliferative disorders; major adverse cardiovascular events; thrombosis; gastrointestinal perforations; neutropenia; lymphopenia; anemia; liver enzyme elevations; and lipid elevations.
High Dose Vitamin A Pregnancy And Lactation Text: High dose vitamin A therapy is contraindicated during pregnancy and breast feeding.
Adbry Counseling: I discussed with the patient the risks of tralokinumab including but not limited to eye infection and irritation, cold sores, injection site reactions, worsening of asthma, allergic reactions and increased risk of parasitic infection.  Live vaccines should be avoided while taking tralokinumab. The patient understands that monitoring is required and they must alert us or the primary physician if symptoms of infection or other concerning signs are noted.
Qbrexza Pregnancy And Lactation Text: There is no available data on Qbrexza use in pregnant women.  There is no available data on Qbrexza use in lactation.
Tazorac Counseling:  Patient advised that medication is irritating and drying.  Patient may need to apply sparingly and wash off after an hour before eventually leaving it on overnight.  The patient verbalized understanding of the proper use and possible adverse effects of tazorac.  All of the patient's questions and concerns were addressed.
Itraconazole Counseling:  I discussed with the patient the risks of itraconazole including but not limited to liver damage, nausea/vomiting, neuropathy, and severe allergy.  The patient understands that this medication is best absorbed when taken with acidic beverages such as non-diet cola or ginger ale.  The patient understands that monitoring is required including baseline LFTs and repeat LFTs at intervals.  The patient understands that they are to contact us or the primary physician if concerning signs are noted.
Tranexamic Acid Counseling:  Patient advised of the small risk of bleeding problems with tranexamic acid. They were also instructed to call if they developed any nausea, vomiting or diarrhea. All of the patient's questions and concerns were addressed.
Azithromycin Counseling:  I discussed with the patient the risks of azithromycin including but not limited to GI upset, allergic reaction, drug rash, diarrhea, and yeast infections.
Gabapentin Counseling: I discussed with the patient the risks of gabapentin including but not limited to dizziness, somnolence, fatigue and ataxia.
Simponi Counseling:  I discussed with the patient the risks of golimumab including but not limited to myelosuppression, immunosuppression, autoimmune hepatitis, demyelinating diseases, lymphoma, and serious infections.  The patient understands that monitoring is required including a PPD at baseline and must alert us or the primary physician if symptoms of infection or other concerning signs are noted.
Erythromycin Pregnancy And Lactation Text: This medication is Pregnancy Category B and is considered safe during pregnancy. It is also excreted in breast milk.
Mirvaso Counseling: Mirvaso is a topical medication which can decrease superficial blood flow where applied. Side effects are uncommon and include stinging, redness and allergic reactions.
Oxybutynin Counseling:  I discussed with the patient the risks of oxybutynin including but not limited to skin rash, drowsiness, dry mouth, difficulty urinating, and blurred vision.
Sarecycline Counseling: Patient advised regarding possible photosensitivity and discoloration of the teeth, skin, lips, tongue and gums.  Patient instructed to avoid sunlight, if possible.  When exposed to sunlight, patients should wear protective clothing, sunglasses, and sunscreen.  The patient was instructed to call the office immediately if the following severe adverse effects occur:  hearing changes, easy bruising/bleeding, severe headache, or vision changes.  The patient verbalized understanding of the proper use and possible adverse effects of sarecycline.  All of the patient's questions and concerns were addressed.
Spironolactone Counseling: Patient advised regarding risks of diarrhea, abdominal pain, hyperkalemia, birth defects (for female patients), liver toxicity and renal toxicity. The patient may need blood work to monitor liver and kidney function and potassium levels while on therapy. The patient verbalized understanding of the proper use and possible adverse effects of spironolactone.  All of the patient's questions and concerns were addressed.
Dupixent Pregnancy And Lactation Text: This medication likely crosses the placenta but the risk for the fetus is uncertain. This medication is excreted in breast milk.
Topical Steroids Applications Pregnancy And Lactation Text: Most topical steroids are considered safe to use during pregnancy and lactation.  Any topical steroid applied to the breast or nipple should be washed off before breastfeeding.
Calcipotriene Pregnancy And Lactation Text: The use of this medication during pregnancy or lactation is not recommended as there is insufficient data.
Niacinamide Pregnancy And Lactation Text: These medications are considered safe during pregnancy.
Aklief counseling:  Patient advised to apply a pea-sized amount only at bedtime and wait 30 minutes after washing their face before applying.  If too drying, patient may add a non-comedogenic moisturizer.  The most commonly reported side effects including irritation, redness, scaling, dryness, stinging, burning, itching, and increased risk of sunburn.  The patient verbalized understanding of the proper use and possible adverse effects of retinoids.  All of the patient's questions and concerns were addressed.
Infliximab Counseling:  I discussed with the patient the risks of infliximab including but not limited to myelosuppression, immunosuppression, autoimmune hepatitis, demyelinating diseases, lymphoma, and serious infections.  The patient understands that monitoring is required including a PPD at baseline and must alert us or the primary physician if symptoms of infection or other concerning signs are noted.
Nsaids Counseling: NSAID Counseling: I discussed with the patient that NSAIDs should be taken with food. Prolonged use of NSAIDs can result in the development of stomach ulcers.  Patient advised to stop taking NSAIDs if abdominal pain occurs.  The patient verbalized understanding of the proper use and possible adverse effects of NSAIDs.  All of the patient's questions and concerns were addressed.
Hydroquinone Counseling:  Patient advised that medication may result in skin irritation, lightening (hypopigmentation), dryness, and burning.  In the event of skin irritation, the patient was advised to reduce the amount of the drug applied or use it less frequently.  Rarely, spots that are treated with hydroquinone can become darker (pseudoochronosis).  Should this occur, patient instructed to stop medication and call the office. The patient verbalized understanding of the proper use and possible adverse effects of hydroquinone.  All of the patient's questions and concerns were addressed.
Arava Counseling:  Patient counseled regarding adverse effects of Arava including but not limited to nausea, vomiting, abnormalities in liver function tests. Patients may develop mouth sores, rash, diarrhea, and abnormalities in blood counts. The patient understands that monitoring is required including LFTs and blood counts.  There is a rare possibility of scarring of the liver and lung problems that can occur when taking methotrexate. Persistent nausea, loss of appetite, pale stools, dark urine, cough, and shortness of breath should be reported immediately. Patient advised to discontinue Arava treatment and consult with a physician prior to attempting conception. The patient will have to undergo a treatment to eliminate Arava from the body prior to conception.
Doxepin Pregnancy And Lactation Text: This medication is Pregnancy Category C and it isn't known if it is safe during pregnancy. It is also excreted in breast milk and breast feeding isn't recommended.
Cantharidin Counseling:  I discussed with the patient the risks of Cantharidin including but not limited to pain, redness, burning, itching, and blistering.
Cantharidin Pregnancy And Lactation Text: This medication has not been proven safe during pregnancy. It is unknown if this medication is excreted in breast milk.
Litfulo Pregnancy And Lactation Text: Based on animal studies, Lifulo may cause embryo-fetal harm when administered to pregnant women.  The medication should not be used in pregnancy.  Breastfeeding is not recommended during treatment.
Topical Sulfur Applications Counseling: Topical Sulfur Counseling: Patient counseled that this medication may cause skin irritation or allergic reactions.  In the event of skin irritation, the patient was advised to reduce the amount of the drug applied or use it less frequently.   The patient verbalized understanding of the proper use and possible adverse effects of topical sulfur application.  All of the patient's questions and concerns were addressed.
Adbry Pregnancy And Lactation Text: It is unknown if this medication will adversely affect pregnancy or breast feeding.
Tazorac Pregnancy And Lactation Text: This medication is not safe during pregnancy. It is unknown if this medication is excreted in breast milk.
Metronidazole Counseling:  I discussed with the patient the risks of metronidazole including but not limited to seizures, nausea/vomiting, a metallic taste in the mouth, nausea/vomiting and severe allergy.
Xolair Counseling:  Patient informed of potential adverse effects including but not limited to fever, muscle aches, rash and allergic reactions.  The patient verbalized understanding of the proper use and possible adverse effects of Xolair.  All of the patient's questions and concerns were addressed.
Rhofade Counseling: Rhofade is a topical medication which can decrease superficial blood flow where applied. Side effects are uncommon and include stinging, redness and allergic reactions.
Tranexamic Acid Pregnancy And Lactation Text: It is unknown if this medication is safe during pregnancy or breast feeding.
Azithromycin Pregnancy And Lactation Text: This medication is considered safe during pregnancy and is also secreted in breast milk.
Mirvaso Pregnancy And Lactation Text: This medication has not been assigned a Pregnancy Risk Category. It is unknown if the medication is excreted in breast milk.
Enbrel Counseling:  I discussed with the patient the risks of etanercept including but not limited to myelosuppression, immunosuppression, autoimmune hepatitis, demyelinating diseases, lymphoma, and infections.  The patient understands that monitoring is required including a PPD at baseline and must alert us or the primary physician if symptoms of infection or other concerning signs are noted.
Dutasteride Male Counseling: Dustasteride Counseling:  I discussed with the patient the risks of use of dutasteride including but not limited to decreased libido, decreased ejaculate volume, and gynecomastia. Women who can become pregnant should not handle medication.  All of the patient's questions and concerns were addressed.
Acitretin Counseling:  I discussed with the patient the risks of acitretin including but not limited to hair loss, dry lips/skin/eyes, liver damage, hyperlipidemia, depression/suicidal ideation, photosensitivity.  Serious rare side effects can include but are not limited to pancreatitis, pseudotumor cerebri, bony changes, clot formation/stroke/heart attack.  Patient understands that alcohol is contraindicated since it can result in liver toxicity and significantly prolong the elimination of the drug by many years.
Olumiant Counseling: I discussed with the patient the risks of Olumiant therapy including but not limited to upper respiratory tract infections, shingles, cold sores, and nausea. Live vaccines should be avoided.  This medication has been linked to serious infections; higher rate of mortality; malignancy and lymphoproliferative disorders; major adverse cardiovascular events; thrombosis; gastrointestinal perforations; neutropenia; lymphopenia; anemia; liver enzyme elevations; and lipid elevations.
Sarecycline Pregnancy And Lactation Text: This medication is Pregnancy Category D and not consider safe during pregnancy. It is also excreted in breast milk.
Aklief Pregnancy And Lactation Text: It is unknown if this medication is safe to use during pregnancy.  It is unknown if this medication is excreted in breast milk.  Breastfeeding women should use the topical cream on the smallest area of the skin for the shortest time needed while breastfeeding.  Do not apply to nipple and areola.
Spironolactone Pregnancy And Lactation Text: This medication can cause feminization of the male fetus and should be avoided during pregnancy. The active metabolite is also found in breast milk.
Ketoconazole Counseling:   Patient counseled regarding improving absorption with orange juice.  Adverse effects include but are not limited to breast enlargement, headache, diarrhea, nausea, upset stomach, liver function test abnormalities, taste disturbance, and stomach pain.  There is a rare possibility of liver failure that can occur when taking ketoconazole. The patient understands that monitoring of LFTs may be required, especially at baseline. The patient verbalized understanding of the proper use and possible adverse effects of ketoconazole.  All of the patient's questions and concerns were addressed.
Xolair Pregnancy And Lactation Text: This medication is Pregnancy Category B and is considered safe during pregnancy. This medication is excreted in breast milk.
Topical Clindamycin Counseling: Patient counseled that this medication may cause skin irritation or allergic reactions.  In the event of skin irritation, the patient was advised to reduce the amount of the drug applied or use it less frequently.   The patient verbalized understanding of the proper use and possible adverse effects of clindamycin.  All of the patient's questions and concerns were addressed.
Skyrizi Counseling: I discussed with the patient the risks of risankizumab-rzaa including but not limited to immunosuppression, and serious infections.  The patient understands that monitoring is required including a PPD at baseline and must alert us or the primary physician if symptoms of infection or other concerning signs are noted.
Metronidazole Pregnancy And Lactation Text: This medication is Pregnancy Category B and considered safe during pregnancy.  It is also excreted in breast milk.
Opzelura Counseling:  I discussed with the patient the risks of Opzelura including but not limited to nasopharngitis, bronchitis, ear infection, eosinophila, hives, diarrhea, folliculitis, tonsillitis, and rhinorrhea.  Taken orally, this medication has been linked to serious infections; higher rate of mortality; malignancy and lymphoproliferative disorders; major adverse cardiovascular events; thrombosis; thrombocytopenia, anemia, and neutropenia; and lipid elevations.
Glycopyrrolate Counseling:  I discussed with the patient the risks of glycopyrrolate including but not limited to skin rash, drowsiness, dry mouth, difficulty urinating, and blurred vision.
Nsaids Pregnancy And Lactation Text: These medications are considered safe up to 30 weeks gestation. It is excreted in breast milk.
Hydroxyzine Counseling: Patient advised that the medication is sedating and not to drive a car after taking this medication.  Patient informed of potential adverse effects including but not limited to dry mouth, urinary retention, and blurry vision.  The patient verbalized understanding of the proper use and possible adverse effects of hydroxyzine.  All of the patient's questions and concerns were addressed.
5-Fu Counseling: 5-Fluorouracil Counseling:  I discussed with the patient the risks of 5-fluorouracil including but not limited to erythema, scaling, itching, weeping, crusting, and pain.
Valtrex Counseling: I discussed with the patient the risks of valacyclovir including but not limited to kidney damage, nausea, vomiting and severe allergy.  The patient understands that if the infection seems to be worsening or is not improving, they are to call.
Topical Sulfur Applications Pregnancy And Lactation Text: This medication is Pregnancy Category C and has an unknown safety profile during pregnancy. It is unknown if this topical medication is excreted in breast milk.
Tetracycline Counseling: Patient counseled regarding possible photosensitivity and increased risk for sunburn.  Patient instructed to avoid sunlight, if possible.  When exposed to sunlight, patients should wear protective clothing, sunglasses, and sunscreen.  The patient was instructed to call the office immediately if the following severe adverse effects occur:  hearing changes, easy bruising/bleeding, severe headache, or vision changes.  The patient verbalized understanding of the proper use and possible adverse effects of tetracycline.  All of the patient's questions and concerns were addressed. Patient understands to avoid pregnancy while on therapy due to potential birth defects.
Dutasteride Female Counseling: Dutasteride Counseling:  I discussed with the patient the risks of use of dutasteride including but not limited to decreased libido and sexual dysfunction. Explained the teratogenic nature of the medication and stressed the importance of not getting pregnant during treatment. All of the patient's questions and concerns were addressed.
Bactrim Counseling:  I discussed with the patient the risks of sulfa antibiotics including but not limited to GI upset, allergic reaction, drug rash, diarrhea, dizziness, photosensitivity, and yeast infections.  Rarely, more serious reactions can occur including but not limited to aplastic anemia, agranulocytosis, methemoglobinemia, blood dyscrasias, liver or kidney failure, lung infiltrates or desquamative/blistering drug rashes.
Methotrexate Counseling:  Patient counseled regarding adverse effects of methotrexate including but not limited to nausea, vomiting, abnormalities in liver function tests. Patients may develop mouth sores, rash, diarrhea, and abnormalities in blood counts. The patient understands that monitoring is required including LFT's and blood counts.  There is a rare possibility of scarring of the liver and lung problems that can occur when taking methotrexate. Persistent nausea, loss of appetite, pale stools, dark urine, cough, and shortness of breath should be reported immediately. Patient advised to discontinue methotrexate treatment at least three months before attempting to become pregnant.  I discussed the need for folate supplements while taking methotrexate.  These supplements can decrease side effects during methotrexate treatment. The patient verbalized understanding of the proper use and possible adverse effects of methotrexate.  All of the patient's questions and concerns were addressed.
Bimzelx Counseling:  I discussed with the patient the risks of Bimzelx including but not limited to depression, immunosuppression, allergic reactions and infections.  The patient understands that monitoring is required including a PPD at baseline and must alert us or the primary physician if symptoms of infection or other concerning signs are noted.
Rituxan Counseling:  I discussed with the patient the risks of Rituxan infusions. Side effects can include infusion reactions, severe drug rashes including mucocutaneous reactions, reactivation of latent hepatitis and other infections and rarely progressive multifocal leukoencephalopathy.  All of the patient's questions and concerns were addressed.
Acitretin Pregnancy And Lactation Text: This medication is Pregnancy Category X and should not be given to women who are pregnant or may become pregnant in the future. This medication is excreted in breast milk.
Zoryve Counseling:  I discussed with the patient that Zoryve is not for use in the eyes, mouth or vagina. The most commonly reported side effects include diarrhea, headache, insomnia, application site pain, upper respiratory tract infections, and urinary tract infections.  All of the patient's questions and concerns were addressed.
Cephalexin Pregnancy And Lactation Text: This medication is Pregnancy Category B and considered safe during pregnancy.  It is also excreted in breast milk but can be used safely for shorter doses.
Bactrim Pregnancy And Lactation Text: This medication is Pregnancy Category D and is known to cause fetal risk.  It is also excreted in breast milk.
Azathioprine Counseling:  I discussed with the patient the risks of azathioprine including but not limited to myelosuppression, immunosuppression, hepatotoxicity, lymphoma, and infections.  The patient understands that monitoring is required including baseline LFTs, Creatinine, possible TPMP genotyping and weekly CBCs for the first month and then every 2 weeks thereafter.  The patient verbalized understanding of the proper use and possible adverse effects of azathioprine.  All of the patient's questions and concerns were addressed.
Olumiant Pregnancy And Lactation Text: Based on animal studies, Olumiant may cause embryo-fetal harm when administered to pregnant women.  The medication should not be used in pregnancy.  Breastfeeding is not recommended during treatment.
Opzelura Pregnancy And Lactation Text: There is insufficient data to evaluate drug-associated risk for major birth defects, miscarriage, or other adverse maternal or fetal outcomes.  There is a pregnancy registry that monitors pregnancy outcomes in pregnant persons exposed to the medication during pregnancy.  It is unknown if this medication is excreted in breast milk.  Do not breastfeed during treatment and for about 4 weeks after the last dose.
Glycopyrrolate Pregnancy And Lactation Text: This medication is Pregnancy Category B and is considered safe during pregnancy. It is unknown if it is excreted breast milk.
Clofazimine Counseling:  I discussed with the patient the risks of clofazimine including but not limited to skin and eye pigmentation, liver damage, nausea/vomiting, gastrointestinal bleeding and allergy.
Propranolol Counseling:  I discussed with the patient the risks of propranolol including but not limited to low heart rate, low blood pressure, low blood sugar, restlessness and increased cold sensitivity. They should call the office if they experience any of these side effects.
Imiquimod Counseling:  I discussed with the patient the risks of imiquimod including but not limited to erythema, scaling, itching, weeping, crusting, and pain.  Patient understands that the inflammatory response to imiquimod is variable from person to person and was educated regarded proper titration schedule.  If flu-like symptoms develop, patient knows to discontinue the medication and contact us.
Azelaic Acid Counseling: Patient counseled that medicine may cause skin irritation and to avoid applying near the eyes.  In the event of skin irritation, the patient was advised to reduce the amount of the drug applied or use it less frequently.   The patient verbalized understanding of the proper use and possible adverse effects of azelaic acid.  All of the patient's questions and concerns were addressed.
Hydroxyzine Pregnancy And Lactation Text: This medication is not safe during pregnancy and should not be taken. It is also excreted in breast milk and breast feeding isn't recommended.
Dutasteride Pregnancy And Lactation Text: This medication is absolutely contraindicated in women, especially during pregnancy and breast feeding. Feminization of male fetuses is possible if taking while pregnant.
Minocycline Counseling: Patient advised regarding possible photosensitivity and discoloration of the teeth, skin, lips, tongue and gums.  Patient instructed to avoid sunlight, if possible.  When exposed to sunlight, patients should wear protective clothing, sunglasses, and sunscreen.  The patient was instructed to call the office immediately if the following severe adverse effects occur:  hearing changes, easy bruising/bleeding, severe headache, or vision changes.  The patient verbalized understanding of the proper use and possible adverse effects of minocycline.  All of the patient's questions and concerns were addressed.
Solaraze Counseling:  I discussed with the patient the risks of Solaraze including but not limited to erythema, scaling, itching, weeping, crusting, and pain.
Ketoconazole Pregnancy And Lactation Text: This medication is Pregnancy Category C and it isn't know if it is safe during pregnancy. It is also excreted in breast milk and breast feeding isn't recommended.
Valtrex Pregnancy And Lactation Text: this medication is Pregnancy Category B and is considered safe during pregnancy. This medication is not directly found in breast milk but it's metabolite acyclovir is present.
Methotrexate Pregnancy And Lactation Text: This medication is Pregnancy Category X and is known to cause fetal harm. This medication is excreted in breast milk.
Olanzapine Counseling- I discussed with the patient the common side effects of olanzapine including but are not limited to: lack of energy, dry mouth, increased appetite, sleepiness, tremor, constipation, dizziness, changes in behavior, or restlessness.  Explained that teenagers are more likely to experience headaches, abdominal pain, pain in the arms or legs, tiredness, and sleepiness.  Serious side effects include but are not limited: increased risk of death in elderly patients who are confused, have memory loss, or dementia-related psychosis; hyperglycemia; increased cholesterol and triglycerides; and weight gain.
Wartpeel Counseling:  I discussed with the patient the risks of Wartpeel including but not limited to erythema, scaling, itching, weeping, crusting, and pain.
Bimzelx Pregnancy And Lactation Text: This medication crosses the placenta and the safety is uncertain during pregnancy. It is unknown if this medication is present in breast milk.
Rinvoq Counseling: I discussed with the patient the risks of Rinvoq therapy including but not limited to upper respiratory tract infections, shingles, cold sores, bronchitis, nausea, cough, fever, acne, and headache. Live vaccines should be avoided.  This medication has been linked to serious infections; higher rate of mortality; malignancy and lymphoproliferative disorders; major adverse cardiovascular events; thrombosis; thrombocytopenia, anemia, and neutropenia; lipid elevations; liver enzyme elevations; and gastrointestinal perforations.
Drysol Counseling:  I discussed with the patient the risks of drysol/aluminum chloride including but not limited to skin rash, itching, irritation, burning.
Bexarotene Counseling:  I discussed with the patient the risks of bexarotene including but not limited to hair loss, dry lips/skin/eyes, liver abnormalities, hyperlipidemia, pancreatitis, depression/suicidal ideation, photosensitivity, drug rash/allergic reactions, hypothyroidism, anemia, leukopenia, infection, cataracts, and teratogenicity.  Patient understands that they will need regular blood tests to check lipid profile, liver function tests, white blood cell count, thyroid function tests and pregnancy test if applicable.
Erivedge Counseling- I discussed with the patient the risks of Erivedge including but not limited to nausea, vomiting, diarrhea, constipation, weight loss, changes in the sense of taste, decreased appetite, muscle spasms, and hair loss.  The patient verbalized understanding of the proper use and possible adverse effects of Erivedge.  All of the patient's questions and concerns were addressed.
Cephalexin Counseling: I counseled the patient regarding use of cephalexin as an antibiotic for prophylactic and/or therapeutic purposes. Cephalexin (commonly prescribed under brand name Keflex) is a cephalosporin antibiotic which is active against numerous classes of bacteria, including most skin bacteria. Side effects may include nausea, diarrhea, gastrointestinal upset, rash, hives, yeast infections, and in rare cases, hepatitis, kidney disease, seizures, fever, confusion, neurologic symptoms, and others. Patients with severe allergies to penicillin medications are cautioned that there is about a 10% incidence of cross-reactivity with cephalosporins. When possible, patients with penicillin allergies should use alternatives to cephalosporins for antibiotic therapy.
Prednisone Counseling:  I discussed with the patient the risks of prolonged use of prednisone including but not limited to weight gain, insomnia, osteoporosis, mood changes, diabetes, susceptibility to infection, glaucoma and high blood pressure.  In cases where prednisone use is prolonged, patients should be monitored with blood pressure checks, serum glucose levels and an eye exam.  Additionally, the patient may need to be placed on GI prophylaxis, PCP prophylaxis, and calcium and vitamin D supplementation and/or a bisphosphonate.  The patient verbalized understanding of the proper use and the possible adverse effects of prednisone.  All of the patient's questions and concerns were addressed.
Rituxan Pregnancy And Lactation Text: This medication is Pregnancy Category C and it isn't know if it is safe during pregnancy. It is unknown if this medication is excreted in breast milk but similar antibodies are known to be excreted.
Humira Counseling:  I discussed with the patient the risks of adalimumab including but not limited to myelosuppression, immunosuppression, autoimmune hepatitis, demyelinating diseases, lymphoma, and serious infections.  The patient understands that monitoring is required including a PPD at baseline and must alert us or the primary physician if symptoms of infection or other concerning signs are noted.
Picato Counseling:  I discussed with the patient the risks of Picato including but not limited to erythema, scaling, itching, weeping, crusting, and pain.
Propranolol Pregnancy And Lactation Text: This medication is Pregnancy Category C and it isn't known if it is safe during pregnancy. It is excreted in breast milk.
Clindamycin Counseling: I counseled the patient regarding use of clindamycin as an antibiotic for prophylactic and/or therapeutic purposes. Clindamycin is active against numerous classes of bacteria, including skin bacteria. Side effects may include nausea, diarrhea, gastrointestinal upset, rash, hives, yeast infections, and in rare cases, colitis.
Finasteride Male Counseling: Finasteride Counseling:  I discussed with the patient the risks of use of finasteride including but not limited to decreased libido, decreased ejaculate volume, gynecomastia, and depression. Women should not handle medication.  All of the patient's questions and concerns were addressed.
Topical Ketoconazole Counseling: Patient counseled that this medication may cause skin irritation or allergic reactions.  In the event of skin irritation, the patient was advised to reduce the amount of the drug applied or use it less frequently.   The patient verbalized understanding of the proper use and possible adverse effects of ketoconazole.  All of the patient's questions and concerns were addressed.
Cimzia Counseling:  I discussed with the patient the risks of Cimzia including but not limited to immunosuppression, allergic reactions and infections.  The patient understands that monitoring is required including a PPD at baseline and must alert us or the primary physician if symptoms of infection or other concerning signs are noted.
Solaraze Pregnancy And Lactation Text: This medication is Pregnancy Category B and is considered safe. There is some data to suggest avoiding during the third trimester. It is unknown if this medication is excreted in breast milk.
Terbinafine Counseling: Patient counseling regarding adverse effects of terbinafine including but not limited to headache, diarrhea, rash, upset stomach, liver function test abnormalities, itching, taste/smell disturbance, nausea, abdominal pain, and flatulence.  There is a rare possibility of liver failure that can occur when taking terbinafine.  The patient understands that a baseline LFT and kidney function test may be required. The patient verbalized understanding of the proper use and possible adverse effects of terbinafine.  All of the patient's questions and concerns were addressed.
SSKI Counseling:  I discussed with the patient the risks of SSKI including but not limited to thyroid abnormalities, metallic taste, GI upset, fever, headache, acne, arthralgias, paraesthesias, lymphadenopathy, easy bleeding, arrhythmias, and allergic reaction.
Stelara Counseling:  I discussed with the patient the risks of ustekinumab including but not limited to immunosuppression, malignancy, posterior leukoencephalopathy syndrome, and serious infections.  The patient understands that monitoring is required including a PPD at baseline and must alert us or the primary physician if symptoms of infection or other concerning signs are noted.
Hydroxychloroquine Counseling:  I discussed with the patient that a baseline ophthalmologic exam is needed at the start of therapy and every year thereafter while on therapy. A CBC may also be warranted for monitoring.  The side effects of this medication were discussed with the patient, including but not limited to agranulocytosis, aplastic anemia, seizures, rashes, retinopathy, and liver toxicity. Patient instructed to call the office should any adverse effect occur.  The patient verbalized understanding of the proper use and possible adverse effects of Plaquenil.  All the patient's questions and concerns were addressed.
Albendazole Counseling:  I discussed with the patient the risks of albendazole including but not limited to cytopenia, kidney damage, nausea/vomiting and severe allergy.  The patient understands that this medication is being used in an off-label manner.
Olanzapine Pregnancy And Lactation Text: This medication is pregnancy category C.   There are no adequate and well controlled trials with olanzapine in pregnant females.  Olanzapine should be used during pregnancy only if the potential benefit justifies the potential risk to the fetus.   In a study in lactating healthy women, olanzapine was excreted in breast milk.  It is recommended that women taking olanzapine should not breast feed.

## 2024-04-10 NOTE — PROCEDURE: KOH PREP
Detail Level: Detailed
Cpt Desired: 
Showing: no pathologic findings
Koh Intro Text (From The.....): A KOH prep was ordered and evaluated
Koh Procedure Text (Tissue Harvesting Technique): A 15-blade scalpel was used to scrape the skin. The skin scrapings were placed on a glass slide, covered with a coverslip and a KOH solution was applied.

## 2024-04-10 NOTE — HPI: RASH
What Type Of Note Output Would You Prefer (Optional)?: Bullet Format
How Severe Is Your Rash?: moderate
Is This A New Presentation, Or A Follow-Up?: Rash
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 C/o itchy

## 2024-04-10 NOTE — PROCEDURE: COUNSELING
Birth Control Pills Pregnancy And Lactation Text: This medication should be avoided if pregnant and for the first 30 days post-partum.
Doxycycline Pregnancy And Lactation Text: This medication is Pregnancy Category D and not consider safe during pregnancy. It is also excreted in breast milk but is considered safe for shorter treatment courses.
Topical Retinoid Pregnancy And Lactation Text: This medication is Pregnancy Category C. It is unknown if this medication is excreted in breast milk.
Aklief counseling:  Patient advised to apply a pea-sized amount only at bedtime and wait 30 minutes after washing their face before applying.  If too drying, patient may add a non-comedogenic moisturizer.  The most commonly reported side effects including irritation, redness, scaling, dryness, stinging, burning, itching, and increased risk of sunburn.  The patient verbalized understanding of the proper use and possible adverse effects of retinoids.  All of the patient's questions and concerns were addressed.
Azelaic Acid Counseling: Patient counseled that medicine may cause skin irritation and to avoid applying near the eyes.  In the event of skin irritation, the patient was advised to reduce the amount of the drug applied or use it less frequently.   The patient verbalized understanding of the proper use and possible adverse effects of azelaic acid.  All of the patient's questions and concerns were addressed.
Erythromycin Pregnancy And Lactation Text: This medication is Pregnancy Category B and is considered safe during pregnancy. It is also excreted in breast milk.
Dapsone Pregnancy And Lactation Text: This medication is Pregnancy Category C and is not considered safe during pregnancy or breast feeding.
Topical Sulfur Applications Pregnancy And Lactation Text: This medication is Pregnancy Category C and has an unknown safety profile during pregnancy. It is unknown if this topical medication is excreted in breast milk.
Spironolactone Counseling: Patient advised regarding risks of diarrhea, abdominal pain, hyperkalemia, birth defects (for female patients), liver toxicity and renal toxicity. The patient may need blood work to monitor liver and kidney function and potassium levels while on therapy. The patient verbalized understanding of the proper use and possible adverse effects of spironolactone.  All of the patient's questions and concerns were addressed.
Benzoyl Peroxide Pregnancy And Lactation Text: This medication is Pregnancy Category C. It is unknown if benzoyl peroxide is excreted in breast milk.
Bactrim Pregnancy And Lactation Text: This medication is Pregnancy Category D and is known to cause fetal risk.  It is also excreted in breast milk.
Tetracycline Counseling: Patient counseled regarding possible photosensitivity and increased risk for sunburn.  Patient instructed to avoid sunlight, if possible.  When exposed to sunlight, patients should wear protective clothing, sunglasses, and sunscreen.  The patient was instructed to call the office immediately if the following severe adverse effects occur:  hearing changes, easy bruising/bleeding, severe headache, or vision changes.  The patient verbalized understanding of the proper use and possible adverse effects of tetracycline.  All of the patient's questions and concerns were addressed. Patient understands to avoid pregnancy while on therapy due to potential birth defects.
Sarecycline Counseling: Patient advised regarding possible photosensitivity and discoloration of the teeth, skin, lips, tongue and gums.  Patient instructed to avoid sunlight, if possible.  When exposed to sunlight, patients should wear protective clothing, sunglasses, and sunscreen.  The patient was instructed to call the office immediately if the following severe adverse effects occur:  hearing changes, easy bruising/bleeding, severe headache, or vision changes.  The patient verbalized understanding of the proper use and possible adverse effects of sarecycline.  All of the patient's questions and concerns were addressed.
Azithromycin Pregnancy And Lactation Text: This medication is considered safe during pregnancy and is also secreted in breast milk.
High Dose Vitamin A Counseling: Side effects reviewed, pt to contact office should one occur.
Minocycline Counseling: Patient advised regarding possible photosensitivity and discoloration of the teeth, skin, lips, tongue and gums.  Patient instructed to avoid sunlight, if possible.  When exposed to sunlight, patients should wear protective clothing, sunglasses, and sunscreen.  The patient was instructed to call the office immediately if the following severe adverse effects occur:  hearing changes, easy bruising/bleeding, severe headache, or vision changes.  The patient verbalized understanding of the proper use and possible adverse effects of minocycline.  All of the patient's questions and concerns were addressed.
Azelaic Acid Pregnancy And Lactation Text: This medication is considered safe during pregnancy and breast feeding.
Topical Sulfur Applications Counseling: Topical Sulfur Counseling: Patient counseled that this medication may cause skin irritation or allergic reactions.  In the event of skin irritation, the patient was advised to reduce the amount of the drug applied or use it less frequently.   The patient verbalized understanding of the proper use and possible adverse effects of topical sulfur application.  All of the patient's questions and concerns were addressed.
Winlevi Counseling:  I discussed with the patient the risks of topical clascoterone including but not limited to erythema, scaling, itching, and stinging. Patient voiced their understanding.
Tazorac Counseling:  Patient advised that medication is irritating and drying.  Patient may need to apply sparingly and wash off after an hour before eventually leaving it on overnight.  The patient verbalized understanding of the proper use and possible adverse effects of tazorac.  All of the patient's questions and concerns were addressed.
Tetracycline Pregnancy And Lactation Text: This medication is Pregnancy Category D and not consider safe during pregnancy. It is also excreted in breast milk.
Dapsone Counseling: I discussed with the patient the risks of dapsone including but not limited to hemolytic anemia, agranulocytosis, rashes, methemoglobinemia, kidney failure, peripheral neuropathy, headaches, GI upset, and liver toxicity.  Patients who start dapsone require monitoring including baseline LFTs and weekly CBCs for the first month, then every month thereafter.  The patient verbalized understanding of the proper use and possible adverse effects of dapsone.  All of the patient's questions and concerns were addressed.
Topical Clindamycin Counseling: Patient counseled that this medication may cause skin irritation or allergic reactions.  In the event of skin irritation, the patient was advised to reduce the amount of the drug applied or use it less frequently.   The patient verbalized understanding of the proper use and possible adverse effects of clindamycin.  All of the patient's questions and concerns were addressed.
Erythromycin Counseling:  I discussed with the patient the risks of erythromycin including but not limited to GI upset, allergic reaction, drug rash, diarrhea, increase in liver enzymes, and yeast infections.
Aklief Pregnancy And Lactation Text: It is unknown if this medication is safe to use during pregnancy.  It is unknown if this medication is excreted in breast milk.  Breastfeeding women should use the topical cream on the smallest area of the skin for the shortest time needed while breastfeeding.  Do not apply to nipple and areola.
Isotretinoin Counseling: Patient should get monthly blood tests, not donate blood, not drive at night if vision affected, not share medication, and not undergo elective surgery for 6 months after tx completed. Side effects reviewed, pt to contact office should one occur.
Doxycycline Counseling:  Patient counseled regarding possible photosensitivity and increased risk for sunburn.  Patient instructed to avoid sunlight, if possible.  When exposed to sunlight, patients should wear protective clothing, sunglasses, and sunscreen.  The patient was instructed to call the office immediately if the following severe adverse effects occur:  hearing changes, easy bruising/bleeding, severe headache, or vision changes.  The patient verbalized understanding of the proper use and possible adverse effects of doxycycline.  All of the patient's questions and concerns were addressed.
Birth Control Pills Counseling: Birth Control Pill Counseling: I discussed with the patient the potential side effects of OCPs including but not limited to increased risk of stroke, heart attack, thrombophlebitis, deep venous thrombosis, hepatic adenomas, breast changes, GI upset, headaches, and depression.  The patient verbalized understanding of the proper use and possible adverse effects of OCPs. All of the patient's questions and concerns were addressed.
Benzoyl Peroxide Counseling: Patient counseled that medicine may cause skin irritation and bleach clothing.  In the event of skin irritation, the patient was advised to reduce the amount of the drug applied or use it less frequently.   The patient verbalized understanding of the proper use and possible adverse effects of benzoyl peroxide.  All of the patient's questions and concerns were addressed.
Topical Retinoid counseling:  Patient advised to apply a pea-sized amount only at bedtime and wait 30 minutes after washing their face before applying.  If too drying, patient may add a non-comedogenic moisturizer. The patient verbalized understanding of the proper use and possible adverse effects of retinoids.  All of the patient's questions and concerns were addressed.
Spironolactone Pregnancy And Lactation Text: This medication can cause feminization of the male fetus and should be avoided during pregnancy. The active metabolite is also found in breast milk.
Azithromycin Counseling:  I discussed with the patient the risks of azithromycin including but not limited to GI upset, allergic reaction, drug rash, diarrhea, and yeast infections.
Winlevi Pregnancy And Lactation Text: This medication is considered safe during pregnancy and breastfeeding.
Detail Level: Detailed
Use Enhanced Medication Counseling?: No
Bactrim Counseling:  I discussed with the patient the risks of sulfa antibiotics including but not limited to GI upset, allergic reaction, drug rash, diarrhea, dizziness, photosensitivity, and yeast infections.  Rarely, more serious reactions can occur including but not limited to aplastic anemia, agranulocytosis, methemoglobinemia, blood dyscrasias, liver or kidney failure, lung infiltrates or desquamative/blistering drug rashes.
Isotretinoin Pregnancy And Lactation Text: This medication is Pregnancy Category X and is considered extremely dangerous during pregnancy. It is unknown if it is excreted in breast milk.
Tazorac Pregnancy And Lactation Text: This medication is not safe during pregnancy. It is unknown if this medication is excreted in breast milk.
High Dose Vitamin A Pregnancy And Lactation Text: High dose vitamin A therapy is contraindicated during pregnancy and breast feeding.
Topical Clindamycin Pregnancy And Lactation Text: This medication is Pregnancy Category B and is considered safe during pregnancy. It is unknown if it is excreted in breast milk.

## 2024-05-01 ENCOUNTER — APPOINTMENT (RX ONLY)
Dept: URBAN - METROPOLITAN AREA CLINIC 45 | Facility: CLINIC | Age: 24
Setting detail: DERMATOLOGY
End: 2024-05-01

## 2024-05-01 DIAGNOSIS — L81.0 POSTINFLAMMATORY HYPERPIGMENTATION: ICD-10-CM

## 2024-05-01 DIAGNOSIS — L30.0 NUMMULAR DERMATITIS: ICD-10-CM | Status: IMPROVED

## 2024-05-01 PROBLEM — L30.9 DERMATITIS, UNSPECIFIED: Status: ACTIVE | Noted: 2024-05-01

## 2024-05-01 PROCEDURE — ? PRESCRIPTION MEDICATION MANAGEMENT

## 2024-05-01 PROCEDURE — ? MEDICATION COUNSELING

## 2024-05-01 PROCEDURE — 99213 OFFICE O/P EST LOW 20 MIN: CPT

## 2024-05-01 PROCEDURE — ? COUNSELING

## 2024-05-01 ASSESSMENT — LOCATION ZONE DERM: LOCATION ZONE: LEG

## 2024-05-01 ASSESSMENT — LOCATION SIMPLE DESCRIPTION DERM: LOCATION SIMPLE: LEFT PRETIBIAL REGION

## 2024-05-01 ASSESSMENT — LOCATION DETAILED DESCRIPTION DERM: LOCATION DETAILED: LEFT PROXIMAL PRETIBIAL REGION

## 2024-05-01 NOTE — PROCEDURE: MEDICATION COUNSELING
Doxepin Counseling:  Patient advised that the medication is sedating and not to drive a car after taking this medication. Patient informed of potential adverse effects including but not limited to dry mouth, urinary retention, and blurry vision.  The patient verbalized understanding of the proper use and possible adverse effects of doxepin.  All of the patient's questions and concerns were addressed.
Use Enhanced Medication Counseling?: No
Methotrexate Pregnancy And Lactation Text: This medication is Pregnancy Category X and is known to cause fetal harm. This medication is excreted in breast milk.
Adbry Counseling: I discussed with the patient the risks of tralokinumab including but not limited to eye infection and irritation, cold sores, injection site reactions, worsening of asthma, allergic reactions and increased risk of parasitic infection.  Live vaccines should be avoided while taking tralokinumab. The patient understands that monitoring is required and they must alert us or the primary physician if symptoms of infection or other concerning signs are noted.
Eucrisa Counseling: Patient may experience a mild burning sensation during topical application. Eucrisa is not approved in children less than 2 years of age.
Qbrexza Counseling:  I discussed with the patient the risks of Qbrexza including but not limited to headache, mydriasis, blurred vision, dry eyes, nasal dryness, dry mouth, dry throat, dry skin, urinary hesitation, and constipation.  Local skin reactions including erythema, burning, stinging, and itching can also occur.
Minoxidil Pregnancy And Lactation Text: This medication has not been assigned a Pregnancy Risk Category but animal studies failed to show danger with the topical medication. It is unknown if the medication is excreted in breast milk.
Hydroxychloroquine Counseling:  I discussed with the patient that a baseline ophthalmologic exam is needed at the start of therapy and every year thereafter while on therapy. A CBC may also be warranted for monitoring.  The side effects of this medication were discussed with the patient, including but not limited to agranulocytosis, aplastic anemia, seizures, rashes, retinopathy, and liver toxicity. Patient instructed to call the office should any adverse effect occur.  The patient verbalized understanding of the proper use and possible adverse effects of Plaquenil.  All the patient's questions and concerns were addressed.
Topical Steroids Counseling: I discussed with the patient that prolonged use of topical steroids can result in the increased appearance of superficial blood vessels (telangiectasias), lightening (hypopigmentation) and thinning of the skin (atrophy).  Patient understands to avoid using high potency steroids in skin folds, the groin or the face.  The patient verbalized understanding of the proper use and possible adverse effects of topical steroids.  All of the patient's questions and concerns were addressed.
Azithromycin Pregnancy And Lactation Text: This medication is considered safe during pregnancy and is also secreted in breast milk.
Topical Retinoid Pregnancy And Lactation Text: This medication is Pregnancy Category C. It is unknown if this medication is excreted in breast milk.
Clofazimine Pregnancy And Lactation Text: This medication is Pregnancy Category C and isn't considered safe during pregnancy. It is excreted in breast milk.
Erivedge Pregnancy And Lactation Text: This medication is Pregnancy Category X and is absolutely contraindicated during pregnancy. It is unknown if it is excreted in breast milk.
Stelara Pregnancy And Lactation Text: This medication is Pregnancy Category B and is considered safe during pregnancy. It is unknown if this medication is excreted in breast milk.
Zoryve Pregnancy And Lactation Text: It is unknown if this medication can cause problems during pregnancy and breastfeeding.
Rifampin Pregnancy And Lactation Text: This medication is Pregnancy Category C and it isn't know if it is safe during pregnancy. It is also excreted in breast milk and should not be used if you are breast feeding.
Carac Pregnancy And Lactation Text: This medication is Pregnancy Category X and contraindicated in pregnancy and in women who may become pregnant. It is unknown if this medication is excreted in breast milk.
Erythromycin Counseling:  I discussed with the patient the risks of erythromycin including but not limited to GI upset, allergic reaction, drug rash, diarrhea, increase in liver enzymes, and yeast infections.
Rituxan Counseling:  I discussed with the patient the risks of Rituxan infusions. Side effects can include infusion reactions, severe drug rashes including mucocutaneous reactions, reactivation of latent hepatitis and other infections and rarely progressive multifocal leukoencephalopathy.  All of the patient's questions and concerns were addressed.
Azathioprine Pregnancy And Lactation Text: This medication is Pregnancy Category D and isn't considered safe during pregnancy. It is unknown if this medication is excreted in breast milk.
Finasteride Male Counseling: Finasteride Counseling:  I discussed with the patient the risks of use of finasteride including but not limited to decreased libido, decreased ejaculate volume, gynecomastia, and depression. Women should not handle medication.  All of the patient's questions and concerns were addressed.
SSKI Counseling:  I discussed with the patient the risks of SSKI including but not limited to thyroid abnormalities, metallic taste, GI upset, fever, headache, acne, arthralgias, paraesthesias, lymphadenopathy, easy bleeding, arrhythmias, and allergic reaction.
Bactrim Counseling:  I discussed with the patient the risks of sulfa antibiotics including but not limited to GI upset, allergic reaction, drug rash, diarrhea, dizziness, photosensitivity, and yeast infections.  Rarely, more serious reactions can occur including but not limited to aplastic anemia, agranulocytosis, methemoglobinemia, blood dyscrasias, liver or kidney failure, lung infiltrates or desquamative/blistering drug rashes.
Dupixent Pregnancy And Lactation Text: This medication likely crosses the placenta but the risk for the fetus is uncertain. This medication is excreted in breast milk.
Olanzapine Pregnancy And Lactation Text: This medication is pregnancy category C.   There are no adequate and well controlled trials with olanzapine in pregnant females.  Olanzapine should be used during pregnancy only if the potential benefit justifies the potential risk to the fetus.   In a study in lactating healthy women, olanzapine was excreted in breast milk.  It is recommended that women taking olanzapine should not breast feed.
Itraconazole Counseling:  I discussed with the patient the risks of itraconazole including but not limited to liver damage, nausea/vomiting, neuropathy, and severe allergy.  The patient understands that this medication is best absorbed when taken with acidic beverages such as non-diet cola or ginger ale.  The patient understands that monitoring is required including baseline LFTs and repeat LFTs at intervals.  The patient understands that they are to contact us or the primary physician if concerning signs are noted.
Rinvoq Counseling: I discussed with the patient the risks of Rinvoq therapy including but not limited to upper respiratory tract infections, shingles, cold sores, bronchitis, nausea, cough, fever, acne, and headache. Live vaccines should be avoided.  This medication has been linked to serious infections; higher rate of mortality; malignancy and lymphoproliferative disorders; major adverse cardiovascular events; thrombosis; thrombocytopenia, anemia, and neutropenia; lipid elevations; liver enzyme elevations; and gastrointestinal perforations.
Cellcept Counseling:  I discussed with the patient the risks of mycophenolate mofetil including but not limited to infection/immunosuppression, GI upset, hypokalemia, hypercholesterolemia, bone marrow suppression, lymphoproliferative disorders, malignancy, GI ulceration/bleed/perforation, colitis, interstitial lung disease, kidney failure, progressive multifocal leukoencephalopathy, and birth defects.  The patient understands that monitoring is required including a baseline creatinine and regular CBC testing. In addition, patient must alert us immediately if symptoms of infection or other concerning signs are noted.
Adbry Pregnancy And Lactation Text: It is unknown if this medication will adversely affect pregnancy or breast feeding.
Colchicine Counseling:  Patient counseled regarding adverse effects including but not limited to stomach upset (nausea, vomiting, stomach pain, or diarrhea).  Patient instructed to limit alcohol consumption while taking this medication.  Colchicine may reduce blood counts especially with prolonged use.  The patient understands that monitoring of kidney function and blood counts may be required, especially at baseline. The patient verbalized understanding of the proper use and possible adverse effects of colchicine.  All of the patient's questions and concerns were addressed.
Libtayo Counseling- I discussed with the patient the risks of Libtayo including but not limited to nausea, vomiting, diarrhea, and bone or muscle pain.  The patient verbalized understanding of the proper use and possible adverse effects of Libtayo.  All of the patient's questions and concerns were addressed.
Topical Steroids Applications Pregnancy And Lactation Text: Most topical steroids are considered safe to use during pregnancy and lactation.  Any topical steroid applied to the breast or nipple should be washed off before breastfeeding.
Taltz Counseling: I discussed with the patient the risks of ixekizumab including but not limited to immunosuppression, serious infections, worsening of inflammatory bowel disease and drug reactions.  The patient understands that monitoring is required including a PPD at baseline and must alert us or the primary physician if symptoms of infection or other concerning signs are noted.
Doxepin Pregnancy And Lactation Text: This medication is Pregnancy Category C and it isn't known if it is safe during pregnancy. It is also excreted in breast milk and breast feeding isn't recommended.
Zyclara Counseling:  I discussed with the patient the risks of imiquimod including but not limited to erythema, scaling, itching, weeping, crusting, and pain.  Patient understands that the inflammatory response to imiquimod is variable from person to person and was educated regarded proper titration schedule.  If flu-like symptoms develop, patient knows to discontinue the medication and contact us.
Topical Sulfur Applications Pregnancy And Lactation Text: This medication is Pregnancy Category C and has an unknown safety profile during pregnancy. It is unknown if this topical medication is excreted in breast milk.
Rituxan Pregnancy And Lactation Text: This medication is Pregnancy Category C and it isn't know if it is safe during pregnancy. It is unknown if this medication is excreted in breast milk but similar antibodies are known to be excreted.
Acitretin Counseling:  I discussed with the patient the risks of acitretin including but not limited to hair loss, dry lips/skin/eyes, liver damage, hyperlipidemia, depression/suicidal ideation, photosensitivity.  Serious rare side effects can include but are not limited to pancreatitis, pseudotumor cerebri, bony changes, clot formation/stroke/heart attack.  Patient understands that alcohol is contraindicated since it can result in liver toxicity and significantly prolong the elimination of the drug by many years.
Calcipotriene Pregnancy And Lactation Text: The use of this medication during pregnancy or lactation is not recommended as there is insufficient data.
Oral Minoxidil Counseling- I discussed with the patient the risks of oral minoxidil including but not limited to shortness of breath, swelling of the feet or ankles, dizziness, lightheadedness, unwanted hair growth and allergic reaction.  The patient verbalized understanding of the proper use and possible adverse effects of oral minoxidil.  All of the patient's questions and concerns were addressed.
Aklief counseling:  Patient advised to apply a pea-sized amount only at bedtime and wait 30 minutes after washing their face before applying.  If too drying, patient may add a non-comedogenic moisturizer.  The most commonly reported side effects including irritation, redness, scaling, dryness, stinging, burning, itching, and increased risk of sunburn.  The patient verbalized understanding of the proper use and possible adverse effects of retinoids.  All of the patient's questions and concerns were addressed.
Enbrel Counseling:  I discussed with the patient the risks of etanercept including but not limited to myelosuppression, immunosuppression, autoimmune hepatitis, demyelinating diseases, lymphoma, and infections.  The patient understands that monitoring is required including a PPD at baseline and must alert us or the primary physician if symptoms of infection or other concerning signs are noted.
Sarecycline Counseling: Patient advised regarding possible photosensitivity and discoloration of the teeth, skin, lips, tongue and gums.  Patient instructed to avoid sunlight, if possible.  When exposed to sunlight, patients should wear protective clothing, sunglasses, and sunscreen.  The patient was instructed to call the office immediately if the following severe adverse effects occur:  hearing changes, easy bruising/bleeding, severe headache, or vision changes.  The patient verbalized understanding of the proper use and possible adverse effects of sarecycline.  All of the patient's questions and concerns were addressed.
Erythromycin Pregnancy And Lactation Text: This medication is Pregnancy Category B and is considered safe during pregnancy. It is also excreted in breast milk.
Calcipotriene Counseling:  I discussed with the patient the risks of calcipotriene including but not limited to erythema, scaling, itching, and irritation.
Prednisone Counseling:  I discussed with the patient the risks of prolonged use of prednisone including but not limited to weight gain, insomnia, osteoporosis, mood changes, diabetes, susceptibility to infection, glaucoma and high blood pressure.  In cases where prednisone use is prolonged, patients should be monitored with blood pressure checks, serum glucose levels and an eye exam.  Additionally, the patient may need to be placed on GI prophylaxis, PCP prophylaxis, and calcium and vitamin D supplementation and/or a bisphosphonate.  The patient verbalized understanding of the proper use and the possible adverse effects of prednisone.  All of the patient's questions and concerns were addressed.
Mirvaso Counseling: Mirvaso is a topical medication which can decrease superficial blood flow where applied. Side effects are uncommon and include stinging, redness and allergic reactions.
Tazorac Counseling:  Patient advised that medication is irritating and drying.  Patient may need to apply sparingly and wash off after an hour before eventually leaving it on overnight.  The patient verbalized understanding of the proper use and possible adverse effects of tazorac.  All of the patient's questions and concerns were addressed.
Hydroxychloroquine Pregnancy And Lactation Text: This medication has been shown to cause fetal harm but it isn't assigned a Pregnancy Risk Category. There are small amounts excreted in breast milk.
Qbrexza Pregnancy And Lactation Text: There is no available data on Qbrexza use in pregnant women.  There is no available data on Qbrexza use in lactation.
Metronidazole Counseling:  I discussed with the patient the risks of metronidazole including but not limited to seizures, nausea/vomiting, a metallic taste in the mouth, nausea/vomiting and severe allergy.
Mirvaso Pregnancy And Lactation Text: This medication has not been assigned a Pregnancy Risk Category. It is unknown if the medication is excreted in breast milk.
Detail Level: Simple
Wartpeel Counseling:  I discussed with the patient the risks of Wartpeel including but not limited to erythema, scaling, itching, weeping, crusting, and pain.
Finasteride Female Counseling: Finasteride Counseling:  I discussed with the patient the risks of use of finasteride including but not limited to decreased libido and sexual dysfunction. Explained the teratogenic nature of the medication and stressed the importance of not getting pregnant during treatment. All of the patient's questions and concerns were addressed.
Sski Pregnancy And Lactation Text: This medication is Pregnancy Category D and isn't considered safe during pregnancy. It is excreted in breast milk.
Itraconazole Pregnancy And Lactation Text: This medication is Pregnancy Category C and it isn't know if it is safe during pregnancy. It is also excreted in breast milk.
Low Dose Naltrexone Counseling- I discussed with the patient the potential risks and side effects of low dose naltrexone including but not limited to: more vivid dreams, headaches, nausea, vomiting, abdominal pain, fatigue, dizziness, and anxiety.
Siliq Counseling:  I discussed with the patient the risks of Siliq including but not limited to new or worsening depression, suicidal thoughts and behavior, immunosuppression, malignancy, posterior leukoencephalopathy syndrome, and serious infections.  The patient understands that monitoring is required including a PPD at baseline and must alert us or the primary physician if symptoms of infection or other concerning signs are noted. There is also a special program designed to monitor depression which is required with Siliq.
Acitretin Pregnancy And Lactation Text: This medication is Pregnancy Category X and should not be given to women who are pregnant or may become pregnant in the future. This medication is excreted in breast milk.
Finasteride Pregnancy And Lactation Text: This medication is absolutely contraindicated during pregnancy. It is unknown if it is excreted in breast milk.
Oral Minoxidil Pregnancy And Lactation Text: This medication should only be used when clearly needed if you are pregnant, attempting to become pregnant or breast feeding.
Topical Sulfur Applications Counseling: Topical Sulfur Counseling: Patient counseled that this medication may cause skin irritation or allergic reactions.  In the event of skin irritation, the patient was advised to reduce the amount of the drug applied or use it less frequently.   The patient verbalized understanding of the proper use and possible adverse effects of topical sulfur application.  All of the patient's questions and concerns were addressed.
Bactrim Pregnancy And Lactation Text: This medication is Pregnancy Category D and is known to cause fetal risk.  It is also excreted in breast milk.
Libtayo Pregnancy And Lactation Text: This medication is contraindicated in pregnancy and when breast feeding.
Rhofade Counseling: Rhofade is a topical medication which can decrease superficial blood flow where applied. Side effects are uncommon and include stinging, redness and allergic reactions.
Cantharidin Counseling:  I discussed with the patient the risks of Cantharidin including but not limited to pain, redness, burning, itching, and blistering.
Sarecycline Pregnancy And Lactation Text: This medication is Pregnancy Category D and not consider safe during pregnancy. It is also excreted in breast milk.
Tazorac Pregnancy And Lactation Text: This medication is not safe during pregnancy. It is unknown if this medication is excreted in breast milk.
Bimzelx Counseling:  I discussed with the patient the risks of Bimzelx including but not limited to depression, immunosuppression, allergic reactions and infections.  The patient understands that monitoring is required including a PPD at baseline and must alert us or the primary physician if symptoms of infection or other concerning signs are noted.
Prednisone Pregnancy And Lactation Text: This medication is Pregnancy Category C and it isn't know if it is safe during pregnancy. This medication is excreted in breast milk.
Hydroxyzine Counseling: Patient advised that the medication is sedating and not to drive a car after taking this medication.  Patient informed of potential adverse effects including but not limited to dry mouth, urinary retention, and blurry vision.  The patient verbalized understanding of the proper use and possible adverse effects of hydroxyzine.  All of the patient's questions and concerns were addressed.
Rinvoq Pregnancy And Lactation Text: Based on animal studies, Rinvoq may cause embryo-fetal harm when administered to pregnant women.  The medication should not be used in pregnancy.  Breastfeeding is not recommended during treatment and for 6 days after the last dose.
Hydroquinone Counseling:  Patient advised that medication may result in skin irritation, lightening (hypopigmentation), dryness, and burning.  In the event of skin irritation, the patient was advised to reduce the amount of the drug applied or use it less frequently.  Rarely, spots that are treated with hydroquinone can become darker (pseudoochronosis).  Should this occur, patient instructed to stop medication and call the office. The patient verbalized understanding of the proper use and possible adverse effects of hydroquinone.  All of the patient's questions and concerns were addressed.
Metronidazole Pregnancy And Lactation Text: This medication is Pregnancy Category B and considered safe during pregnancy.  It is also excreted in breast milk.
Cephalexin Counseling: I counseled the patient regarding use of cephalexin as an antibiotic for prophylactic and/or therapeutic purposes. Cephalexin (commonly prescribed under brand name Keflex) is a cephalosporin antibiotic which is active against numerous classes of bacteria, including most skin bacteria. Side effects may include nausea, diarrhea, gastrointestinal upset, rash, hives, yeast infections, and in rare cases, hepatitis, kidney disease, seizures, fever, confusion, neurologic symptoms, and others. Patients with severe allergies to penicillin medications are cautioned that there is about a 10% incidence of cross-reactivity with cephalosporins. When possible, patients with penicillin allergies should use alternatives to cephalosporins for antibiotic therapy.
5-Fu Counseling: 5-Fluorouracil Counseling:  I discussed with the patient the risks of 5-fluorouracil including but not limited to erythema, scaling, itching, weeping, crusting, and pain.
Tetracycline Counseling: Patient counseled regarding possible photosensitivity and increased risk for sunburn.  Patient instructed to avoid sunlight, if possible.  When exposed to sunlight, patients should wear protective clothing, sunglasses, and sunscreen.  The patient was instructed to call the office immediately if the following severe adverse effects occur:  hearing changes, easy bruising/bleeding, severe headache, or vision changes.  The patient verbalized understanding of the proper use and possible adverse effects of tetracycline.  All of the patient's questions and concerns were addressed. Patient understands to avoid pregnancy while on therapy due to potential birth defects.
Taltz Pregnancy And Lactation Text: The risk during pregnancy and breastfeeding is uncertain with this medication.
Aklief Pregnancy And Lactation Text: It is unknown if this medication is safe to use during pregnancy.  It is unknown if this medication is excreted in breast milk.  Breastfeeding women should use the topical cream on the smallest area of the skin for the shortest time needed while breastfeeding.  Do not apply to nipple and areola.
Opzelura Counseling:  I discussed with the patient the risks of Opzelura including but not limited to nasopharngitis, bronchitis, ear infection, eosinophila, hives, diarrhea, folliculitis, tonsillitis, and rhinorrhea.  Taken orally, this medication has been linked to serious infections; higher rate of mortality; malignancy and lymphoproliferative disorders; major adverse cardiovascular events; thrombosis; thrombocytopenia, anemia, and neutropenia; and lipid elevations.
Hydroxyzine Pregnancy And Lactation Text: This medication is not safe during pregnancy and should not be taken. It is also excreted in breast milk and breast feeding isn't recommended.
Topical Clindamycin Counseling: Patient counseled that this medication may cause skin irritation or allergic reactions.  In the event of skin irritation, the patient was advised to reduce the amount of the drug applied or use it less frequently.   The patient verbalized understanding of the proper use and possible adverse effects of clindamycin.  All of the patient's questions and concerns were addressed.
Sotyktu Counseling:  I discussed the most common side effects of Sotyktu including: common cold, sore throat, sinus infections, cold sores, canker sores, folliculitis, and acne.  I also discussed more serious side effects of Sotyktu including but not limited to: serious allergic reactions; increased risk for infections such as TB; cancers such as lymphomas; rhabdomyolysis and elevated CPK; and elevated triglycerides and liver enzymes. 
Ketoconazole Counseling:   Patient counseled regarding improving absorption with orange juice.  Adverse effects include but are not limited to breast enlargement, headache, diarrhea, nausea, upset stomach, liver function test abnormalities, taste disturbance, and stomach pain.  There is a rare possibility of liver failure that can occur when taking ketoconazole. The patient understands that monitoring of LFTs may be required, especially at baseline. The patient verbalized understanding of the proper use and possible adverse effects of ketoconazole.  All of the patient's questions and concerns were addressed.
Cantharidin Pregnancy And Lactation Text: This medication has not been proven safe during pregnancy. It is unknown if this medication is excreted in breast milk.
Thalidomide Counseling: I discussed with the patient the risks of thalidomide including but not limited to birth defects, anxiety, weakness, chest pain, dizziness, cough and severe allergy.
Bexarotene Counseling:  I discussed with the patient the risks of bexarotene including but not limited to hair loss, dry lips/skin/eyes, liver abnormalities, hyperlipidemia, pancreatitis, depression/suicidal ideation, photosensitivity, drug rash/allergic reactions, hypothyroidism, anemia, leukopenia, infection, cataracts, and teratogenicity.  Patient understands that they will need regular blood tests to check lipid profile, liver function tests, white blood cell count, thyroid function tests and pregnancy test if applicable.
Ketoconazole Pregnancy And Lactation Text: This medication is Pregnancy Category C and it isn't know if it is safe during pregnancy. It is also excreted in breast milk and breast feeding isn't recommended.
Humira Counseling:  I discussed with the patient the risks of adalimumab including but not limited to myelosuppression, immunosuppression, autoimmune hepatitis, demyelinating diseases, lymphoma, and serious infections.  The patient understands that monitoring is required including a PPD at baseline and must alert us or the primary physician if symptoms of infection or other concerning signs are noted.
Otezla Counseling: The side effects of Otezla were discussed with the patient, including but not limited to worsening or new depression, weight loss, diarrhea, nausea, upper respiratory tract infection, and headache. Patient instructed to call the office should any adverse effect occur.  The patient verbalized understanding of the proper use and possible adverse effects of Otezla.  All the patient's questions and concerns were addressed.
Cibinqo Counseling: I discussed with the patient the risks of Cibinqo therapy including but not limited to common cold, nausea, headache, cold sores, increased blood CPK levels, dizziness, UTIs, fatigue, acne, and vomitting. Live vaccines should be avoided.  This medication has been linked to serious infections; higher rate of mortality; malignancy and lymphoproliferative disorders; major adverse cardiovascular events; thrombosis; thrombocytopenia and lymphopenia; lipid elevations; and retinal detachment.
Odomzo Counseling- I discussed with the patient the risks of Odomzo including but not limited to nausea, vomiting, diarrhea, constipation, weight loss, changes in the sense of taste, decreased appetite, muscle spasms, and hair loss.  The patient verbalized understanding of the proper use and possible adverse effects of Odomzo.  All of the patient's questions and concerns were addressed.
Dapsone Counseling: I discussed with the patient the risks of dapsone including but not limited to hemolytic anemia, agranulocytosis, rashes, methemoglobinemia, kidney failure, peripheral neuropathy, headaches, GI upset, and liver toxicity.  Patients who start dapsone require monitoring including baseline LFTs and weekly CBCs for the first month, then every month thereafter.  The patient verbalized understanding of the proper use and possible adverse effects of dapsone.  All of the patient's questions and concerns were addressed.
Birth Control Pills Counseling: Birth Control Pill Counseling: I discussed with the patient the potential side effects of OCPs including but not limited to increased risk of stroke, heart attack, thrombophlebitis, deep venous thrombosis, hepatic adenomas, breast changes, GI upset, headaches, and depression.  The patient verbalized understanding of the proper use and possible adverse effects of OCPs. All of the patient's questions and concerns were addressed.
Opioid Counseling: I discussed with the patient the potential side effects of opioids including but not limited to addiction, altered mental status, and depression. I stressed avoiding alcohol, benzodiazepines, muscle relaxants and sleep aids unless specifically okayed by a physician. The patient verbalized understanding of the proper use and possible adverse effects of opioids. All of the patient's questions and concerns were addressed. They were instructed to flush the remaining pills down the toilet if they did not need them for pain.
Low Dose Naltrexone Pregnancy And Lactation Text: Naltrexone is pregnancy category C.  There have been no adequate and well-controlled studies in pregnant women.  It should be used in pregnancy only if the potential benefit justifies the potential risk to the fetus.   Limited data indicates that naltrexone is minimally excreted into breastmilk.
Tremfya Counseling: I discussed with the patient the risks of guselkumab including but not limited to immunosuppression, serious infections, worsening of inflammatory bowel disease and drug reactions.  The patient understands that monitoring is required including a PPD at baseline and must alert us or the primary physician if symptoms of infection or other concerning signs are noted.
Bimzelx Pregnancy And Lactation Text: This medication crosses the placenta and the safety is uncertain during pregnancy. It is unknown if this medication is present in breast milk.
Imiquimod Counseling:  I discussed with the patient the risks of imiquimod including but not limited to erythema, scaling, itching, weeping, crusting, and pain.  Patient understands that the inflammatory response to imiquimod is variable from person to person and was educated regarded proper titration schedule.  If flu-like symptoms develop, patient knows to discontinue the medication and contact us.
Solaraze Counseling:  I discussed with the patient the risks of Solaraze including but not limited to erythema, scaling, itching, weeping, crusting, and pain.
Sotyktu Pregnancy And Lactation Text: There is insufficient data to evaluate whether or not Sotyktu is safe to use during pregnancy.   It is not known if Sotyktu passes into breast milk and whether or not it is safe to use when breastfeeding.  
Opzelura Pregnancy And Lactation Text: There is insufficient data to evaluate drug-associated risk for major birth defects, miscarriage, or other adverse maternal or fetal outcomes.  There is a pregnancy registry that monitors pregnancy outcomes in pregnant persons exposed to the medication during pregnancy.  It is unknown if this medication is excreted in breast milk.  Do not breastfeed during treatment and for about 4 weeks after the last dose.
Dapsone Pregnancy And Lactation Text: This medication is Pregnancy Category C and is not considered safe during pregnancy or breast feeding.
Topical Clindamycin Pregnancy And Lactation Text: This medication is Pregnancy Category B and is considered safe during pregnancy. It is unknown if it is excreted in breast milk.
Cyclophosphamide Counseling:  I discussed with the patient the risks of cyclophosphamide including but not limited to hair loss, hormonal abnormalities, decreased fertility, abdominal pain, diarrhea, nausea and vomiting, bone marrow suppression and infection. The patient understands that monitoring is required while taking this medication.
Cephalexin Pregnancy And Lactation Text: This medication is Pregnancy Category B and considered safe during pregnancy.  It is also excreted in breast milk but can be used safely for shorter doses.
Simponi Counseling:  I discussed with the patient the risks of golimumab including but not limited to myelosuppression, immunosuppression, autoimmune hepatitis, demyelinating diseases, lymphoma, and serious infections.  The patient understands that monitoring is required including a PPD at baseline and must alert us or the primary physician if symptoms of infection or other concerning signs are noted.
Winlevi Counseling:  I discussed with the patient the risks of topical clascoterone including but not limited to erythema, scaling, itching, and stinging. Patient voiced their understanding.
Minocycline Counseling: Patient advised regarding possible photosensitivity and discoloration of the teeth, skin, lips, tongue and gums.  Patient instructed to avoid sunlight, if possible.  When exposed to sunlight, patients should wear protective clothing, sunglasses, and sunscreen.  The patient was instructed to call the office immediately if the following severe adverse effects occur:  hearing changes, easy bruising/bleeding, severe headache, or vision changes.  The patient verbalized understanding of the proper use and possible adverse effects of minocycline.  All of the patient's questions and concerns were addressed.
Azelaic Acid Counseling: Patient counseled that medicine may cause skin irritation and to avoid applying near the eyes.  In the event of skin irritation, the patient was advised to reduce the amount of the drug applied or use it less frequently.   The patient verbalized understanding of the proper use and possible adverse effects of azelaic acid.  All of the patient's questions and concerns were addressed.
Opioid Pregnancy And Lactation Text: These medications can lead to premature delivery and should be avoided during pregnancy. These medications are also present in breast milk in small amounts.
Birth Control Pills Pregnancy And Lactation Text: This medication should be avoided if pregnant and for the first 30 days post-partum.
Otezla Pregnancy And Lactation Text: This medication is Pregnancy Category C and it isn't known if it is safe during pregnancy. It is unknown if it is excreted in breast milk.
Cyclophosphamide Pregnancy And Lactation Text: This medication is Pregnancy Category D and it isn't considered safe during pregnancy. This medication is excreted in breast milk.
Cibinqo Pregnancy And Lactation Text: It is unknown if this medication will adversely affect pregnancy or breast feeding.  You should not take this medication if you are currently pregnant or planning a pregnancy or while breastfeeding.
Albendazole Counseling:  I discussed with the patient the risks of albendazole including but not limited to cytopenia, kidney damage, nausea/vomiting and severe allergy.  The patient understands that this medication is being used in an off-label manner.
Terbinafine Counseling: Patient counseling regarding adverse effects of terbinafine including but not limited to headache, diarrhea, rash, upset stomach, liver function test abnormalities, itching, taste/smell disturbance, nausea, abdominal pain, and flatulence.  There is a rare possibility of liver failure that can occur when taking terbinafine.  The patient understands that a baseline LFT and kidney function test may be required. The patient verbalized understanding of the proper use and possible adverse effects of terbinafine.  All of the patient's questions and concerns were addressed.
Clindamycin Counseling: I counseled the patient regarding use of clindamycin as an antibiotic for prophylactic and/or therapeutic purposes. Clindamycin is active against numerous classes of bacteria, including skin bacteria. Side effects may include nausea, diarrhea, gastrointestinal upset, rash, hives, yeast infections, and in rare cases, colitis.
Niacinamide Counseling: I recommended taking niacin or niacinamide, also know as vitamin B3, twice daily. Recent evidence suggests that taking vitamin B3 (500 mg twice daily) can reduce the risk of actinic keratoses and non-melanoma skin cancers. Side effects of vitamin B3 include flushing and headache.
Cimzia Counseling:  I discussed with the patient the risks of Cimzia including but not limited to immunosuppression, allergic reactions and infections.  The patient understands that monitoring is required including a PPD at baseline and must alert us or the primary physician if symptoms of infection or other concerning signs are noted.
Bexarotene Pregnancy And Lactation Text: This medication is Pregnancy Category X and should not be given to women who are pregnant or may become pregnant. This medication should not be used if you are breast feeding.
Drysol Counseling:  I discussed with the patient the risks of drysol/aluminum chloride including but not limited to skin rash, itching, irritation, burning.
Xeljanz Counseling: I discussed with the patient the risks of Xeljanz therapy including increased risk of infection, liver issues, headache, diarrhea, or cold symptoms. Live vaccines should be avoided. They were instructed to call if they have any problems.
Winlevi Pregnancy And Lactation Text: This medication is considered safe during pregnancy and breastfeeding.
Niacinamide Pregnancy And Lactation Text: These medications are considered safe during pregnancy.
Cimzia Pregnancy And Lactation Text: This medication crosses the placenta but can be considered safe in certain situations. Cimzia may be excreted in breast milk.
Hyrimoz Counseling:  I discussed with the patient the risks of adalimumab including but not limited to myelosuppression, immunosuppression, autoimmune hepatitis, demyelinating diseases, lymphoma, and serious infections.  The patient understands that monitoring is required including a PPD at baseline and must alert us or the primary physician if symptoms of infection or other concerning signs are noted.
Tranexamic Acid Counseling:  Patient advised of the small risk of bleeding problems with tranexamic acid. They were also instructed to call if they developed any nausea, vomiting or diarrhea. All of the patient's questions and concerns were addressed.
Solaraze Pregnancy And Lactation Text: This medication is Pregnancy Category B and is considered safe. There is some data to suggest avoiding during the third trimester. It is unknown if this medication is excreted in breast milk.
Picato Counseling:  I discussed with the patient the risks of Picato including but not limited to erythema, scaling, itching, weeping, crusting, and pain.
Topical Ketoconazole Counseling: Patient counseled that this medication may cause skin irritation or allergic reactions.  In the event of skin irritation, the patient was advised to reduce the amount of the drug applied or use it less frequently.   The patient verbalized understanding of the proper use and possible adverse effects of ketoconazole.  All of the patient's questions and concerns were addressed.
Azelaic Acid Pregnancy And Lactation Text: This medication is considered safe during pregnancy and breast feeding.
Gabapentin Counseling: I discussed with the patient the risks of gabapentin including but not limited to dizziness, somnolence, fatigue and ataxia.
Albendazole Pregnancy And Lactation Text: This medication is Pregnancy Category C and it isn't known if it is safe during pregnancy. It is also excreted in breast milk.
Tranexamic Acid Pregnancy And Lactation Text: It is unknown if this medication is safe during pregnancy or breast feeding.
Isotretinoin Counseling: Patient should get monthly blood tests, not donate blood, not drive at night if vision affected, not share medication, and not undergo elective surgery for 6 months after tx completed. Side effects reviewed, pt to contact office should one occur.
Fluconazole Counseling:  Patient counseled regarding adverse effects of fluconazole including but not limited to headache, diarrhea, nausea, upset stomach, liver function test abnormalities, taste disturbance, and stomach pain.  There is a rare possibility of liver failure that can occur when taking fluconazole.  The patient understands that monitoring of LFTs and kidney function test may be required, especially at baseline. The patient verbalized understanding of the proper use and possible adverse effects of fluconazole.  All of the patient's questions and concerns were addressed.
Spironolactone Counseling: Patient advised regarding risks of diarrhea, abdominal pain, hyperkalemia, birth defects (for female patients), liver toxicity and renal toxicity. The patient may need blood work to monitor liver and kidney function and potassium levels while on therapy. The patient verbalized understanding of the proper use and possible adverse effects of spironolactone.  All of the patient's questions and concerns were addressed.
Oxybutynin Counseling:  I discussed with the patient the risks of oxybutynin including but not limited to skin rash, drowsiness, dry mouth, difficulty urinating, and blurred vision.
Cyclosporine Counseling:  I discussed with the patient the risks of cyclosporine including but not limited to hypertension, gingival hyperplasia,myelosuppression, immunosuppression, liver damage, kidney damage, neurotoxicity, lymphoma, and serious infections. The patient understands that monitoring is required including baseline blood pressure, CBC, CMP, lipid panel and uric acid, and then 1-2 times monthly CMP and blood pressure.
Terbinafine Pregnancy And Lactation Text: This medication is Pregnancy Category B and is considered safe during pregnancy. It is also excreted in breast milk and breast feeding isn't recommended.
Xolair Counseling:  Patient informed of potential adverse effects including but not limited to fever, muscle aches, rash and allergic reactions.  The patient verbalized understanding of the proper use and possible adverse effects of Xolair.  All of the patient's questions and concerns were addressed.
Litfulo Counseling: I discussed with the patient the risks of Litfulo therapy including but not limited to upper respiratory tract infections, shingles, cold sores, and nausea. Live vaccines should be avoided.  This medication has been linked to serious infections; higher rate of mortality; malignancy and lymphoproliferative disorders; major adverse cardiovascular events; thrombosis; gastrointestinal perforations; neutropenia; lymphopenia; anemia; liver enzyme elevations; and lipid elevations.
Nsaids Counseling: NSAID Counseling: I discussed with the patient that NSAIDs should be taken with food. Prolonged use of NSAIDs can result in the development of stomach ulcers.  Patient advised to stop taking NSAIDs if abdominal pain occurs.  The patient verbalized understanding of the proper use and possible adverse effects of NSAIDs.  All of the patient's questions and concerns were addressed.
Arava Counseling:  Patient counseled regarding adverse effects of Arava including but not limited to nausea, vomiting, abnormalities in liver function tests. Patients may develop mouth sores, rash, diarrhea, and abnormalities in blood counts. The patient understands that monitoring is required including LFTs and blood counts.  There is a rare possibility of scarring of the liver and lung problems that can occur when taking methotrexate. Persistent nausea, loss of appetite, pale stools, dark urine, cough, and shortness of breath should be reported immediately. Patient advised to discontinue Arava treatment and consult with a physician prior to attempting conception. The patient will have to undergo a treatment to eliminate Arava from the body prior to conception.
VTAMA Counseling: I discussed with the patient that VTAMA is not for use in the eyes, mouth or mouth. They should call the office if they develop any signs of allergic reactions to VTAMA. The patient verbalized understanding of the proper use and possible adverse effects of VTAMA.  All of the patient's questions and concerns were addressed.
Skyrizi Counseling: I discussed with the patient the risks of risankizumab-rzaa including but not limited to immunosuppression, and serious infections.  The patient understands that monitoring is required including a PPD at baseline and must alert us or the primary physician if symptoms of infection or other concerning signs are noted.
Isotretinoin Pregnancy And Lactation Text: This medication is Pregnancy Category X and is considered extremely dangerous during pregnancy. It is unknown if it is excreted in breast milk.
Cosentyx Counseling:  I discussed with the patient the risks of Cosentyx including but not limited to worsening of Crohn's disease, immunosuppression, allergic reactions and infections.  The patient understands that monitoring is required including a PPD at baseline and must alert us or the primary physician if symptoms of infection or other concerning signs are noted.
Benzoyl Peroxide Counseling: Patient counseled that medicine may cause skin irritation and bleach clothing.  In the event of skin irritation, the patient was advised to reduce the amount of the drug applied or use it less frequently.   The patient verbalized understanding of the proper use and possible adverse effects of benzoyl peroxide.  All of the patient's questions and concerns were addressed.
Quinolones Counseling:  I discussed with the patient the risks of fluoroquinolones including but not limited to GI upset, allergic reaction, drug rash, diarrhea, dizziness, photosensitivity, yeast infections, liver function test abnormalities, tendonitis/tendon rupture.
Clindamycin Pregnancy And Lactation Text: This medication can be used in pregnancy if certain situations. Clindamycin is also present in breast milk.
Soolantra Counseling: I discussed with the patients the risks of topial Soolantra. This is a medicine which decreases the number of mites and inflammation in the skin. You experience burning, stinging, eye irritation or allergic reactions.  Please call our office if you develop any problems from using this medication.
Klisyri Counseling:  I discussed with the patient the risks of Klisyri including but not limited to erythema, scaling, itching, weeping, crusting, and pain.
Dutasteride Male Counseling: Dustasteride Counseling:  I discussed with the patient the risks of use of dutasteride including but not limited to decreased libido, decreased ejaculate volume, and gynecomastia. Women who can become pregnant should not handle medication.  All of the patient's questions and concerns were addressed.
Doxycycline Counseling:  Patient counseled regarding possible photosensitivity and increased risk for sunburn.  Patient instructed to avoid sunlight, if possible.  When exposed to sunlight, patients should wear protective clothing, sunglasses, and sunscreen.  The patient was instructed to call the office immediately if the following severe adverse effects occur:  hearing changes, easy bruising/bleeding, severe headache, or vision changes.  The patient verbalized understanding of the proper use and possible adverse effects of doxycycline.  All of the patient's questions and concerns were addressed.
Elidel Counseling: Patient may experience a mild burning sensation during topical application. Elidel is not approved in children less than 2 years of age. There have been case reports of hematologic and skin malignancies in patients using topical calcineurin inhibitors although causality is questionable.
Xolair Pregnancy And Lactation Text: This medication is Pregnancy Category B and is considered safe during pregnancy. This medication is excreted in breast milk.
Benzoyl Peroxide Pregnancy And Lactation Text: This medication is Pregnancy Category C. It is unknown if benzoyl peroxide is excreted in breast milk.
Valtrex Counseling: I discussed with the patient the risks of valacyclovir including but not limited to kidney damage, nausea, vomiting and severe allergy.  The patient understands that if the infection seems to be worsening or is not improving, they are to call.
Klisyri Pregnancy And Lactation Text: It is unknown if this medication can harm a developing fetus or if it is excreted in breast milk.
Cimetidine Counseling:  I discussed with the patient the risks of Cimetidine including but not limited to gynecomastia, headache, diarrhea, nausea, drowsiness, arrhythmias, pancreatitis, skin rashes, psychosis, bone marrow suppression and kidney toxicity.
Ivermectin Counseling:  Patient instructed to take medication on an empty stomach with a full glass of water.  Patient informed of potential adverse effects including but not limited to nausea, diarrhea, dizziness, itching, and swelling of the extremities or lymph nodes.  The patient verbalized understanding of the proper use and possible adverse effects of ivermectin.  All of the patient's questions and concerns were addressed.
Litfulo Pregnancy And Lactation Text: Based on animal studies, Lifulo may cause embryo-fetal harm when administered to pregnant women.  The medication should not be used in pregnancy.  Breastfeeding is not recommended during treatment.
Xelclaudiaz Pregnancy And Lactation Text: This medication is Pregnancy Category D and is not considered safe during pregnancy.  The risk during breast feeding is also uncertain.
Infliximab Counseling:  I discussed with the patient the risks of infliximab including but not limited to myelosuppression, immunosuppression, autoimmune hepatitis, demyelinating diseases, lymphoma, and serious infections.  The patient understands that monitoring is required including a PPD at baseline and must alert us or the primary physician if symptoms of infection or other concerning signs are noted.
Spironolactone Pregnancy And Lactation Text: This medication can cause feminization of the male fetus and should be avoided during pregnancy. The active metabolite is also found in breast milk.
High Dose Vitamin A Counseling: Side effects reviewed, pt to contact office should one occur.
Nsaids Pregnancy And Lactation Text: These medications are considered safe up to 30 weeks gestation. It is excreted in breast milk.
Ilumya Counseling: I discussed with the patient the risks of tildrakizumab including but not limited to immunosuppression, malignancy, posterior leukoencephalopathy syndrome, and serious infections.  The patient understands that monitoring is required including a PPD at baseline and must alert us or the primary physician if symptoms of infection or other concerning signs are noted.
Dutasteride Female Counseling: Dutasteride Counseling:  I discussed with the patient the risks of use of dutasteride including but not limited to decreased libido and sexual dysfunction. Explained the teratogenic nature of the medication and stressed the importance of not getting pregnant during treatment. All of the patient's questions and concerns were addressed.
Topical Metronidazole Counseling: Metronidazole is a topical antibiotic medication. You may experience burning, stinging, redness, or allergic reactions.  Please call our office if you develop any problems from using this medication.
Glycopyrrolate Counseling:  I discussed with the patient the risks of glycopyrrolate including but not limited to skin rash, drowsiness, dry mouth, difficulty urinating, and blurred vision.
Protopic Counseling: Patient may experience a mild burning sensation during topical application. Protopic is not approved in children less than 2 years of age. There have been case reports of hematologic and skin malignancies in patients using topical calcineurin inhibitors although causality is questionable.
Soolantra Pregnancy And Lactation Text: This medication is Pregnancy Category C. This medication is considered safe during breast feeding.
Protopic Pregnancy And Lactation Text: This medication is Pregnancy Category C. It is unknown if this medication is excreted in breast milk when applied topically.
Valtrex Pregnancy And Lactation Text: this medication is Pregnancy Category B and is considered safe during pregnancy. This medication is not directly found in breast milk but it's metabolite acyclovir is present.
Topical Retinoid counseling:  Patient advised to apply a pea-sized amount only at bedtime and wait 30 minutes after washing their face before applying.  If too drying, patient may add a non-comedogenic moisturizer. The patient verbalized understanding of the proper use and possible adverse effects of retinoids.  All of the patient's questions and concerns were addressed.
Glycopyrrolate Pregnancy And Lactation Text: This medication is Pregnancy Category B and is considered safe during pregnancy. It is unknown if it is excreted breast milk.
Carac Counseling:  I discussed with the patient the risks of Carac including but not limited to erythema, scaling, itching, weeping, crusting, and pain.
Azathioprine Counseling:  I discussed with the patient the risks of azathioprine including but not limited to myelosuppression, immunosuppression, hepatotoxicity, lymphoma, and infections.  The patient understands that monitoring is required including baseline LFTs, Creatinine, possible TPMP genotyping and weekly CBCs for the first month and then every 2 weeks thereafter.  The patient verbalized understanding of the proper use and possible adverse effects of azathioprine.  All of the patient's questions and concerns were addressed.
Rifampin Counseling: I discussed with the patient the risks of rifampin including but not limited to liver damage, kidney damage, red-orange body fluids, nausea/vomiting and severe allergy.
Minoxidil Counseling: Minoxidil is a topical medication which can increase blood flow where it is applied. It is uncertain how this medication increases hair growth. Side effects are uncommon and include stinging and allergic reactions.
Methotrexate Counseling:  Patient counseled regarding adverse effects of methotrexate including but not limited to nausea, vomiting, abnormalities in liver function tests. Patients may develop mouth sores, rash, diarrhea, and abnormalities in blood counts. The patient understands that monitoring is required including LFT's and blood counts.  There is a rare possibility of scarring of the liver and lung problems that can occur when taking methotrexate. Persistent nausea, loss of appetite, pale stools, dark urine, cough, and shortness of breath should be reported immediately. Patient advised to discontinue methotrexate treatment at least three months before attempting to become pregnant.  I discussed the need for folate supplements while taking methotrexate.  These supplements can decrease side effects during methotrexate treatment. The patient verbalized understanding of the proper use and possible adverse effects of methotrexate.  All of the patient's questions and concerns were addressed.
Topical Metronidazole Pregnancy And Lactation Text: This medication is Pregnancy Category B and considered safe during pregnancy.  It is also considered safe to use while breastfeeding.
Stelara Counseling:  I discussed with the patient the risks of ustekinumab including but not limited to immunosuppression, malignancy, posterior leukoencephalopathy syndrome, and serious infections.  The patient understands that monitoring is required including a PPD at baseline and must alert us or the primary physician if symptoms of infection or other concerning signs are noted.
Propranolol Counseling:  I discussed with the patient the risks of propranolol including but not limited to low heart rate, low blood pressure, low blood sugar, restlessness and increased cold sensitivity. They should call the office if they experience any of these side effects.
Griseofulvin Counseling:  I discussed with the patient the risks of griseofulvin including but not limited to photosensitivity, cytopenia, liver damage, nausea/vomiting and severe allergy.  The patient understands that this medication is best absorbed when taken with a fatty meal (e.g., ice cream or french fries).
Olumiant Counseling: I discussed with the patient the risks of Olumiant therapy including but not limited to upper respiratory tract infections, shingles, cold sores, and nausea. Live vaccines should be avoided.  This medication has been linked to serious infections; higher rate of mortality; malignancy and lymphoproliferative disorders; major adverse cardiovascular events; thrombosis; gastrointestinal perforations; neutropenia; lymphopenia; anemia; liver enzyme elevations; and lipid elevations.
Doxycycline Pregnancy And Lactation Text: This medication is Pregnancy Category D and not consider safe during pregnancy. It is also excreted in breast milk but is considered safe for shorter treatment courses.
Azithromycin Counseling:  I discussed with the patient the risks of azithromycin including but not limited to GI upset, allergic reaction, drug rash, diarrhea, and yeast infections.
Clofazimine Counseling:  I discussed with the patient the risks of clofazimine including but not limited to skin and eye pigmentation, liver damage, nausea/vomiting, gastrointestinal bleeding and allergy.
Erivedge Counseling- I discussed with the patient the risks of Erivedge including but not limited to nausea, vomiting, diarrhea, constipation, weight loss, changes in the sense of taste, decreased appetite, muscle spasms, and hair loss.  The patient verbalized understanding of the proper use and possible adverse effects of Erivedge.  All of the patient's questions and concerns were addressed.
Zoryve Counseling:  I discussed with the patient that Zoryve is not for use in the eyes, mouth or vagina. The most commonly reported side effects include diarrhea, headache, insomnia, application site pain, upper respiratory tract infections, and urinary tract infections.  All of the patient's questions and concerns were addressed.
High Dose Vitamin A Pregnancy And Lactation Text: High dose vitamin A therapy is contraindicated during pregnancy and breast feeding.
Dutasteride Pregnancy And Lactation Text: This medication is absolutely contraindicated in women, especially during pregnancy and breast feeding. Feminization of male fetuses is possible if taking while pregnant.
Olanzapine Counseling- I discussed with the patient the common side effects of olanzapine including but are not limited to: lack of energy, dry mouth, increased appetite, sleepiness, tremor, constipation, dizziness, changes in behavior, or restlessness.  Explained that teenagers are more likely to experience headaches, abdominal pain, pain in the arms or legs, tiredness, and sleepiness.  Serious side effects include but are not limited: increased risk of death in elderly patients who are confused, have memory loss, or dementia-related psychosis; hyperglycemia; increased cholesterol and triglycerides; and weight gain.
Griseofulvin Pregnancy And Lactation Text: This medication is Pregnancy Category X and is known to cause serious birth defects. It is unknown if this medication is excreted in breast milk but breast feeding should be avoided.
Propranolol Pregnancy And Lactation Text: This medication is Pregnancy Category C and it isn't known if it is safe during pregnancy. It is excreted in breast milk.
Olumiant Pregnancy And Lactation Text: Based on animal studies, Olumiant may cause embryo-fetal harm when administered to pregnant women.  The medication should not be used in pregnancy.  Breastfeeding is not recommended during treatment.
Dupixent Counseling: I discussed with the patient the risks of dupilumab including but not limited to eye infection and irritation, cold sores, injection site reactions, worsening of asthma, allergic reactions and increased risk of parasitic infection.  Live vaccines should be avoided while taking dupilumab. Dupilumab will also interact with certain medications such as warfarin and cyclosporine. The patient understands that monitoring is required and they must alert us or the primary physician if symptoms of infection or other concerning signs are noted.

## 2024-05-01 NOTE — PROCEDURE: PRESCRIPTION MEDICATION MANAGEMENT
Continue Regimen: clobetasol 0.05 % topical ointment Bid\\nQuantity: 60.0 g  Days Supply: 30\\nSig: Apply to aa of rash on body bid x 2 weeks then prn with flares.
Plan: Advised patient to continue Clobetasol x2-4 weeks.
Render In Strict Bullet Format?: No
Detail Level: Zone

## 2025-03-03 ENCOUNTER — APPOINTMENT (OUTPATIENT)
Dept: URBAN - METROPOLITAN AREA CLINIC 45 | Facility: CLINIC | Age: 25
Setting detail: DERMATOLOGY
End: 2025-03-03

## 2025-03-03 DIAGNOSIS — L70.8 OTHER ACNE: ICD-10-CM | Status: INADEQUATELY CONTROLLED

## 2025-03-03 PROCEDURE — 99214 OFFICE O/P EST MOD 30 MIN: CPT

## 2025-03-03 PROCEDURE — ? MEDICATION COUNSELING

## 2025-03-03 PROCEDURE — ? FULL BODY SKIN EXAM - DECLINED

## 2025-03-03 PROCEDURE — ? PRESCRIPTION MEDICATION MANAGEMENT

## 2025-03-03 PROCEDURE — ? COUNSELING

## 2025-03-03 PROCEDURE — ? PRESCRIPTION

## 2025-03-03 RX ORDER — SPIRONOLACTONE 50 MG/1
TABLET, FILM COATED ORAL BID
Qty: 90 | Refills: 3 | Status: ERX | COMMUNITY
Start: 2025-03-03

## 2025-03-03 RX ORDER — HYDROQUINONE 4 %
CREAM (GRAM) TOPICAL
Qty: 30 | Refills: 0 | Status: ERX | COMMUNITY
Start: 2025-03-03

## 2025-03-03 RX ORDER — TRETIONIN 0.5 MG/G
CREAM TOPICAL
Qty: 45 | Refills: 3 | Status: ERX

## 2025-03-03 RX ORDER — AZELAIC ACID 0.15 G/G
GEL TOPICAL
Qty: 50 | Refills: 11 | Status: ERX

## 2025-03-03 RX ADMIN — Medication: at 00:00

## 2025-03-03 RX ADMIN — SPIRONOLACTONE: 50 TABLET, FILM COATED ORAL at 00:00

## 2025-03-03 ASSESSMENT — LOCATION DETAILED DESCRIPTION DERM
LOCATION DETAILED: RIGHT INFERIOR MEDIAL MALAR CHEEK
LOCATION DETAILED: LEFT INFERIOR CENTRAL MALAR CHEEK
LOCATION DETAILED: LEFT LOWER CUTANEOUS LIP

## 2025-03-03 ASSESSMENT — LOCATION ZONE DERM
LOCATION ZONE: LIP
LOCATION ZONE: FACE

## 2025-03-03 ASSESSMENT — LOCATION SIMPLE DESCRIPTION DERM
LOCATION SIMPLE: RIGHT CHEEK
LOCATION SIMPLE: LEFT CHEEK
LOCATION SIMPLE: LEFT LIP

## 2025-03-03 NOTE — PROCEDURE: MEDICATION COUNSELING
Carac Pregnancy And Lactation Text: This medication is Pregnancy Category X and contraindicated in pregnancy and in women who may become pregnant. It is unknown if this medication is excreted in breast milk.
Litfulo Counseling: I discussed with the patient the risks of Litfulo therapy including but not limited to upper respiratory tract infections, shingles, cold sores, and nausea. Live vaccines should be avoided.  This medication has been linked to serious infections; higher rate of mortality; malignancy and lymphoproliferative disorders; major adverse cardiovascular events; thrombosis; gastrointestinal perforations; neutropenia; lymphopenia; anemia; liver enzyme elevations; and lipid elevations.
Nemluvio Pregnancy And Lactation Text: It is not known if Nemluvio causes fetal harm or is present in breast milk. Please proceed with caution if patients who are pregnant or breastfeeding.
Cyclophosphamide Pregnancy And Lactation Text: This medication is Pregnancy Category D and it isn't considered safe during pregnancy. This medication is excreted in breast milk.
Zepbound Counseling: I reviewed the possible side effects including: thyroid tumors, kidney disease, gallbladder disease, abdominal pain, constipation, diarrhea, nausea, vomiting and pancreatitis. Do not take this medication if you have a history or family history of multiple endocrine neoplasia syndrome type 2. Side effects reviewed, pt to contact office should one occur.
Rhofade Counseling: Rhofade is a topical medication which can decrease superficial blood flow where applied. Side effects are uncommon and include stinging, redness and allergic reactions.
Colchicine Counseling:  Patient counseled regarding adverse effects including but not limited to stomach upset (nausea, vomiting, stomach pain, or diarrhea).  Patient instructed to limit alcohol consumption while taking this medication.  Colchicine may reduce blood counts especially with prolonged use.  The patient understands that monitoring of kidney function and blood counts may be required, especially at baseline. The patient verbalized understanding of the proper use and possible adverse effects of colchicine.  All of the patient's questions and concerns were addressed.
Bactrim Counseling:  I discussed with the patient the risks of sulfa antibiotics including but not limited to GI upset, allergic reaction, drug rash, diarrhea, dizziness, photosensitivity, and yeast infections.  Rarely, more serious reactions can occur including but not limited to aplastic anemia, agranulocytosis, methemoglobinemia, blood dyscrasias, liver or kidney failure, lung infiltrates or desquamative/blistering drug rashes.
Hydroxyzine Counseling: Patient advised that the medication is sedating and not to drive a car after taking this medication.  Patient informed of potential adverse effects including but not limited to dry mouth, urinary retention, and blurry vision.  The patient verbalized understanding of the proper use and possible adverse effects of hydroxyzine.  All of the patient's questions and concerns were addressed.
Ketoconazole Counseling:   Patient counseled regarding improving absorption with orange juice.  Adverse effects include but are not limited to breast enlargement, headache, diarrhea, nausea, upset stomach, liver function test abnormalities, taste disturbance, and stomach pain.  There is a rare possibility of liver failure that can occur when taking ketoconazole. The patient understands that monitoring of LFTs may be required, especially at baseline. The patient verbalized understanding of the proper use and possible adverse effects of ketoconazole.  All of the patient's questions and concerns were addressed.
Dutasteride Male Counseling: Dustasteride Counseling:  I discussed with the patient the risks of use of dutasteride including but not limited to decreased libido, decreased ejaculate volume, and gynecomastia. Women who can become pregnant should not handle medication.  All of the patient's questions and concerns were addressed.
SSKI Counseling:  I discussed with the patient the risks of SSKI including but not limited to thyroid abnormalities, metallic taste, GI upset, fever, headache, acne, arthralgias, paraesthesias, lymphadenopathy, easy bleeding, arrhythmias, and allergic reaction.
Topical Sulfur Applications Pregnancy And Lactation Text: This medication is Pregnancy Category C and has an unknown safety profile during pregnancy. It is unknown if this topical medication is excreted in breast milk.
Cimzia Pregnancy And Lactation Text: This medication crosses the placenta but can be considered safe in certain situations. Cimzia may be excreted in breast milk.
Xolair Counseling:  Patient informed of potential adverse effects including but not limited to fever, muscle aches, rash and allergic reactions.  The patient verbalized understanding of the proper use and possible adverse effects of Xolair.  All of the patient's questions and concerns were addressed.
Sotyktu Pregnancy And Lactation Text: There is insufficient data to evaluate whether or not Sotyktu is safe to use during pregnancy.   It is not known if Sotyktu passes into breast milk and whether or not it is safe to use when breastfeeding.  
Rituxan Counseling:  I discussed with the patient the risks of Rituxan infusions. Side effects can include infusion reactions, severe drug rashes including mucocutaneous reactions, reactivation of latent hepatitis and other infections and rarely progressive multifocal leukoencephalopathy.  All of the patient's questions and concerns were addressed.
Sarecycline Pregnancy And Lactation Text: This medication is Pregnancy Category D and not consider safe during pregnancy. It is also excreted in breast milk.
Rhofade Pregnancy And Lactation Text: This medication has not been assigned a Pregnancy Risk Category. It is unknown if the medication is excreted in breast milk.
Xolair Pregnancy And Lactation Text: This medication is Pregnancy Category B and is considered safe during pregnancy. This medication is excreted in breast milk.
Colchicine Pregnancy And Lactation Text: This medication is Pregnancy Category C and isn't considered safe during pregnancy. It is excreted in breast milk.
Hydroxyzine Pregnancy And Lactation Text: This medication is not safe during pregnancy and should not be taken. It is also excreted in breast milk and breast feeding isn't recommended.
Bactrim Pregnancy And Lactation Text: This medication is Pregnancy Category D and is known to cause fetal risk.  It is also excreted in breast milk.
Zepbound Pregnancy And Lactation Text: The fetal risk of this medication is unknown and taking while pregnant is not recommended. It is unknown if this medication is present in breast milk.
Cyclosporine Counseling:  I discussed with the patient the risks of cyclosporine including but not limited to hypertension, gingival hyperplasia,myelosuppression, immunosuppression, liver damage, kidney damage, neurotoxicity, lymphoma, and serious infections. The patient understands that monitoring is required including baseline blood pressure, CBC, CMP, lipid panel and uric acid, and then 1-2 times monthly CMP and blood pressure.
Calcipotriene Counseling:  I discussed with the patient the risks of calcipotriene including but not limited to erythema, scaling, itching, and irritation.
Cosentyx Counseling:  I discussed with the patient the risks of Cosentyx including but not limited to worsening of Crohn's disease, immunosuppression, allergic reactions and infections.  The patient understands that monitoring is required including a PPD at baseline and must alert us or the primary physician if symptoms of infection or other concerning signs are noted.
Litfulo Pregnancy And Lactation Text: Based on animal studies, Lifulo may cause embryo-fetal harm when administered to pregnant women.  The medication should not be used in pregnancy.  Breastfeeding is not recommended during treatment.
Ketoconazole Pregnancy And Lactation Text: This medication is Pregnancy Category C and it isn't know if it is safe during pregnancy. It is also excreted in breast milk and breast feeding isn't recommended.
Hydroquinone Pregnancy And Lactation Text: This medication has not been assigned a Pregnancy Risk Category but animal studies failed to show danger with the topical medication. It is unknown if the medication is excreted in breast milk.
Azathioprine Counseling:  I discussed with the patient the risks of azathioprine including but not limited to myelosuppression, immunosuppression, hepatotoxicity, lymphoma, and infections.  The patient understands that monitoring is required including baseline LFTs, Creatinine, possible TPMP genotyping and weekly CBCs for the first month and then every 2 weeks thereafter.  The patient verbalized understanding of the proper use and possible adverse effects of azathioprine.  All of the patient's questions and concerns were addressed.
Cyclosporine Pregnancy And Lactation Text: This medication is Pregnancy Category C and it isn't know if it is safe during pregnancy. This medication is excreted in breast milk.
Wartpeel Counseling:  I discussed with the patient the risks of Wartpeel including but not limited to erythema, scaling, itching, weeping, crusting, and pain.
Tetracycline Counseling: Patient counseled regarding possible photosensitivity and increased risk for sunburn.  Patient instructed to avoid sunlight, if possible.  When exposed to sunlight, patients should wear protective clothing, sunglasses, and sunscreen.  The patient was instructed to call the office immediately if the following severe adverse effects occur:  hearing changes, easy bruising/bleeding, severe headache, or vision changes.  The patient verbalized understanding of the proper use and possible adverse effects of tetracycline.  All of the patient's questions and concerns were addressed. Patient understands to avoid pregnancy while on therapy due to potential birth defects.
Dutasteride Female Counseling: Dutasteride Counseling:  I discussed with the patient the risks of use of dutasteride including but not limited to decreased libido and sexual dysfunction. Explained the teratogenic nature of the medication and stressed the importance of not getting pregnant during treatment. All of the patient's questions and concerns were addressed.
Sski Pregnancy And Lactation Text: This medication is Pregnancy Category D and isn't considered safe during pregnancy. It is excreted in breast milk.
Rituxan Pregnancy And Lactation Text: This medication is Pregnancy Category C and it isn't know if it is safe during pregnancy. It is unknown if this medication is excreted in breast milk but similar antibodies are known to be excreted.
Cosentyx Pregnancy And Lactation Text: This medication is Pregnancy Category B and is considered safe during pregnancy. It is unknown if this medication is excreted in breast milk.
Thalidomide Counseling: I discussed with the patient the risks of thalidomide including but not limited to birth defects, anxiety, weakness, chest pain, dizziness, cough and severe allergy.
Dutasteride Pregnancy And Lactation Text: This medication is absolutely contraindicated in women, especially during pregnancy and breast feeding. Feminization of male fetuses is possible if taking while pregnant.
Solaraze Counseling:  I discussed with the patient the risks of Solaraze including but not limited to erythema, scaling, itching, weeping, crusting, and pain.
Cephalexin Counseling: I counseled the patient regarding use of cephalexin as an antibiotic for prophylactic and/or therapeutic purposes. Cephalexin (commonly prescribed under brand name Keflex) is a cephalosporin antibiotic which is active against numerous classes of bacteria, including most skin bacteria. Side effects may include nausea, diarrhea, gastrointestinal upset, rash, hives, yeast infections, and in rare cases, hepatitis, kidney disease, seizures, fever, confusion, neurologic symptoms, and others. Patients with severe allergies to penicillin medications are cautioned that there is about a 10% incidence of cross-reactivity with cephalosporins. When possible, patients with penicillin allergies should use alternatives to cephalosporins for antibiotic therapy.
Dapsone Counseling: I discussed with the patient the risks of dapsone including but not limited to hemolytic anemia, agranulocytosis, rashes, methemoglobinemia, kidney failure, peripheral neuropathy, headaches, GI upset, and liver toxicity.  Patients who start dapsone require monitoring including baseline LFTs and weekly CBCs for the first month, then every month thereafter.  The patient verbalized understanding of the proper use and possible adverse effects of dapsone.  All of the patient's questions and concerns were addressed.
Calcipotriene Pregnancy And Lactation Text: The use of this medication during pregnancy or lactation is not recommended as there is insufficient data.
Olumiant Counseling: I discussed with the patient the risks of Olumiant therapy including but not limited to upper respiratory tract infections, shingles, cold sores, and nausea. Live vaccines should be avoided.  This medication has been linked to serious infections; higher rate of mortality; malignancy and lymphoproliferative disorders; major adverse cardiovascular events; thrombosis; gastrointestinal perforations; neutropenia; lymphopenia; anemia; liver enzyme elevations; and lipid elevations.
Methotrexate Counseling:  Patient counseled regarding adverse effects of methotrexate including but not limited to nausea, vomiting, abnormalities in liver function tests. Patients may develop mouth sores, rash, diarrhea, and abnormalities in blood counts. The patient understands that monitoring is required including LFT's and blood counts.  There is a rare possibility of scarring of the liver and lung problems that can occur when taking methotrexate. Persistent nausea, loss of appetite, pale stools, dark urine, cough, and shortness of breath should be reported immediately. Patient advised to discontinue methotrexate treatment at least three months before attempting to become pregnant.  I discussed the need for folate supplements while taking methotrexate.  These supplements can decrease side effects during methotrexate treatment. The patient verbalized understanding of the proper use and possible adverse effects of methotrexate.  All of the patient's questions and concerns were addressed.
Siliq Counseling:  I discussed with the patient the risks of Siliq including but not limited to new or worsening depression, suicidal thoughts and behavior, immunosuppression, malignancy, posterior leukoencephalopathy syndrome, and serious infections.  The patient understands that monitoring is required including a PPD at baseline and must alert us or the primary physician if symptoms of infection or other concerning signs are noted. There is also a special program designed to monitor depression which is required with Siliq.
Cephalexin Pregnancy And Lactation Text: This medication is Pregnancy Category B and considered safe during pregnancy.  It is also excreted in breast milk but can be used safely for shorter doses.
Cantharidin Counseling:  I discussed with the patient the risks of Cantharidin including but not limited to pain, redness, burning, itching, and blistering.
Dapsone Pregnancy And Lactation Text: This medication is Pregnancy Category C and is not considered safe during pregnancy or breast feeding.
Low Dose Naltrexone Counseling- I discussed with the patient the potential risks and side effects of low dose naltrexone including but not limited to: more vivid dreams, headaches, nausea, vomiting, abdominal pain, fatigue, dizziness, and anxiety.
Imiquimod Counseling:  I discussed with the patient the risks of imiquimod including but not limited to erythema, scaling, itching, weeping, crusting, and pain.  Patient understands that the inflammatory response to imiquimod is variable from person to person and was educated regarded proper titration schedule.  If flu-like symptoms develop, patient knows to discontinue the medication and contact us.
Ivermectin Pregnancy And Lactation Text: This medication is Pregnancy Category C and it isn't known if it is safe during pregnancy. It is also excreted in breast milk.
Terbinafine Counseling: Patient counseling regarding adverse effects of terbinafine including but not limited to headache, diarrhea, rash, upset stomach, liver function test abnormalities, itching, taste/smell disturbance, nausea, abdominal pain, and flatulence.  There is a rare possibility of liver failure that can occur when taking terbinafine.  The patient understands that a baseline LFT and kidney function test may be required. The patient verbalized understanding of the proper use and possible adverse effects of terbinafine.  All of the patient's questions and concerns were addressed.
Opioid Counseling: I discussed with the patient the potential side effects of opioids including but not limited to addiction, altered mental status, and depression. I stressed avoiding alcohol, benzodiazepines, muscle relaxants and sleep aids unless specifically okayed by a physician. The patient verbalized understanding of the proper use and possible adverse effects of opioids. All of the patient's questions and concerns were addressed. They were instructed to flush the remaining pills down the toilet if they did not need them for pain.
Solaraze Pregnancy And Lactation Text: This medication is Pregnancy Category B and is considered safe. There is some data to suggest avoiding during the third trimester. It is unknown if this medication is excreted in breast milk.
Finasteride Male Counseling: Finasteride Counseling:  I discussed with the patient the risks of use of finasteride including but not limited to decreased libido, decreased ejaculate volume, gynecomastia, and depression. Women should not handle medication.  All of the patient's questions and concerns were addressed.
Opioid Pregnancy And Lactation Text: These medications can lead to premature delivery and should be avoided during pregnancy. These medications are also present in breast milk in small amounts.
Dupixent Counseling: I discussed with the patient the risks of dupilumab including but not limited to eye infection and irritation, cold sores, injection site reactions, worsening of asthma, allergic reactions and increased risk of parasitic infection.  Live vaccines should be avoided while taking dupilumab. Dupilumab will also interact with certain medications such as warfarin and cyclosporine. The patient understands that monitoring is required and they must alert us or the primary physician if symptoms of infection or other concerning signs are noted.
Terbinafine Pregnancy And Lactation Text: This medication is Pregnancy Category B and is considered safe during pregnancy. It is also excreted in breast milk and breast feeding isn't recommended.
Olumiant Pregnancy And Lactation Text: Based on animal studies, Olumiant may cause embryo-fetal harm when administered to pregnant women.  The medication should not be used in pregnancy.  Breastfeeding is not recommended during treatment.
Winlevi Counseling:  I discussed with the patient the risks of topical clascoterone including but not limited to erythema, scaling, itching, and stinging. Patient voiced their understanding.
Imiquimod Pregnancy And Lactation Text: This medication is Pregnancy Category C. It is unknown if this medication is excreted in breast milk.
Thalidomide Pregnancy And Lactation Text: This medication is Pregnancy Category X and is absolutely contraindicated during pregnancy. It is unknown if it is excreted in breast milk.
Low Dose Naltrexone Pregnancy And Lactation Text: Naltrexone is pregnancy category C.  There have been no adequate and well-controlled studies in pregnant women.  It should be used in pregnancy only if the potential benefit justifies the potential risk to the fetus.   Limited data indicates that naltrexone is minimally excreted into breastmilk.
5-Fu Counseling: 5-Fluorouracil Counseling:  I discussed with the patient the risks of 5-fluorouracil including but not limited to erythema, scaling, itching, weeping, crusting, and pain.
Soolantra Counseling: I discussed with the patients the risks of topial Soolantra. This is a medicine which decreases the number of mites and inflammation in the skin. You experience burning, stinging, eye irritation or allergic reactions.  Please call our office if you develop any problems from using this medication.
Rinvoq Counseling: I discussed with the patient the risks of Rinvoq therapy including but not limited to upper respiratory tract infections, shingles, cold sores, bronchitis, nausea, cough, fever, acne, and headache. Live vaccines should be avoided.  This medication has been linked to serious infections; higher rate of mortality; malignancy and lymphoproliferative disorders; major adverse cardiovascular events; thrombosis; thrombocytopenia, anemia, and neutropenia; lipid elevations; liver enzyme elevations; and gastrointestinal perforations.
Siliq Pregnancy And Lactation Text: The risk during pregnancy and breastfeeding is uncertain with this medication.
Methotrexate Pregnancy And Lactation Text: This medication is Pregnancy Category X and is known to cause fetal harm. This medication is excreted in breast milk.
Gabapentin Counseling: I discussed with the patient the risks of gabapentin including but not limited to dizziness, somnolence, fatigue and ataxia.
Clindamycin Counseling: I counseled the patient regarding use of clindamycin as an antibiotic for prophylactic and/or therapeutic purposes. Clindamycin is active against numerous classes of bacteria, including skin bacteria. Side effects may include nausea, diarrhea, gastrointestinal upset, rash, hives, yeast infections, and in rare cases, colitis.
Adbry Counseling: I discussed with the patient the risks of tralokinumab including but not limited to eye infection and irritation, cold sores, injection site reactions, worsening of asthma, allergic reactions and increased risk of parasitic infection.  Live vaccines should be avoided while taking tralokinumab. The patient understands that monitoring is required and they must alert us or the primary physician if symptoms of infection or other concerning signs are noted.
Quinolones Counseling:  I discussed with the patient the risks of fluoroquinolones including but not limited to GI upset, allergic reaction, drug rash, diarrhea, dizziness, photosensitivity, yeast infections, liver function test abnormalities, tendonitis/tendon rupture.
Otezla Pregnancy And Lactation Text: This medication is Pregnancy Category C and it isn't known if it is safe during pregnancy. It is unknown if it is excreted in breast milk.
Libtayo Counseling- I discussed with the patient the risks of Libtayo including but not limited to nausea, vomiting, diarrhea, and bone or muscle pain.  The patient verbalized understanding of the proper use and possible adverse effects of Libtayo.  All of the patient's questions and concerns were addressed.
Azelaic Acid Counseling: Patient counseled that medicine may cause skin irritation and to avoid applying near the eyes.  In the event of skin irritation, the patient was advised to reduce the amount of the drug applied or use it less frequently.   The patient verbalized understanding of the proper use and possible adverse effects of azelaic acid.  All of the patient's questions and concerns were addressed.
Ivermectin Counseling:  Patient instructed to take medication on an empty stomach with a full glass of water.  Patient informed of potential adverse effects including but not limited to nausea, diarrhea, dizziness, itching, and swelling of the extremities or lymph nodes.  The patient verbalized understanding of the proper use and possible adverse effects of ivermectin.  All of the patient's questions and concerns were addressed.
Topical Metronidazole Counseling: Metronidazole is a topical antibiotic medication. You may experience burning, stinging, redness, or allergic reactions.  Please call our office if you develop any problems from using this medication.
Azathioprine Pregnancy And Lactation Text: This medication is Pregnancy Category D and isn't considered safe during pregnancy. It is unknown if this medication is excreted in breast milk.
Semaglutide Counseling: I reviewed the possible side effects including: thyroid tumors, kidney disease, gallbladder disease, abdominal pain, constipation, diarrhea, nausea, vomiting and pancreatitis. Do not take this medication if you have a history or family history of multiple endocrine neoplasia syndrome type 2. Side effects reviewed, pt to contact office should one occur.
Azelaic Acid Pregnancy And Lactation Text: This medication is considered safe during pregnancy and breast feeding.
Arava Counseling:  Patient counseled regarding adverse effects of Arava including but not limited to nausea, vomiting, abnormalities in liver function tests. Patients may develop mouth sores, rash, diarrhea, and abnormalities in blood counts. The patient understands that monitoring is required including LFTs and blood counts.  There is a rare possibility of scarring of the liver and lung problems that can occur when taking methotrexate. Persistent nausea, loss of appetite, pale stools, dark urine, cough, and shortness of breath should be reported immediately. Patient advised to discontinue Arava treatment and consult with a physician prior to attempting conception. The patient will have to undergo a treatment to eliminate Arava from the body prior to conception.
Bexarotene Pregnancy And Lactation Text: This medication is Pregnancy Category X and should not be given to women who are pregnant or may become pregnant. This medication should not be used if you are breast feeding.
Protopic Counseling: Patient may experience a mild burning sensation during topical application. Protopic is not approved in children less than 2 years of age. There have been case reports of hematologic and skin malignancies in patients using topical calcineurin inhibitors although causality is questionable.
Fluconazole Pregnancy And Lactation Text: This medication is Pregnancy Category C and it isn't know if it is safe during pregnancy. It is also excreted in breast milk.
Cimetidine Counseling:  I discussed with the patient the risks of Cimetidine including but not limited to gynecomastia, headache, diarrhea, nausea, drowsiness, arrhythmias, pancreatitis, skin rashes, psychosis, bone marrow suppression and kidney toxicity.
Oxybutynin Counseling:  I discussed with the patient the risks of oxybutynin including but not limited to skin rash, drowsiness, dry mouth, difficulty urinating, and blurred vision.
Libtayo Pregnancy And Lactation Text: This medication is contraindicated in pregnancy and when breast feeding.
Isotretinoin Counseling: Patient should get monthly blood tests, not donate blood, not drive at night if vision affected, not share medication, and not undergo elective surgery for 6 months after tx completed. Side effects reviewed, pt to contact office should one occur.
Topical Metronidazole Pregnancy And Lactation Text: This medication is Pregnancy Category B and considered safe during pregnancy.  It is also considered safe to use while breastfeeding.
Griseofulvin Counseling:  I discussed with the patient the risks of griseofulvin including but not limited to photosensitivity, cytopenia, liver damage, nausea/vomiting and severe allergy.  The patient understands that this medication is best absorbed when taken with a fatty meal (e.g., ice cream or french fries).
Taltz Counseling: I discussed with the patient the risks of ixekizumab including but not limited to immunosuppression, serious infections, worsening of inflammatory bowel disease and drug reactions.  The patient understands that monitoring is required including a PPD at baseline and must alert us or the primary physician if symptoms of infection or other concerning signs are noted.
Adbry Pregnancy And Lactation Text: It is unknown if this medication will adversely affect pregnancy or breast feeding.
Protopic Pregnancy And Lactation Text: This medication is Pregnancy Category C. It is unknown if this medication is excreted in breast milk when applied topically.
Benzoyl Peroxide Counseling: Patient counseled that medicine may cause skin irritation and bleach clothing.  In the event of skin irritation, the patient was advised to reduce the amount of the drug applied or use it less frequently.   The patient verbalized understanding of the proper use and possible adverse effects of benzoyl peroxide.  All of the patient's questions and concerns were addressed.
Infliximab Counseling:  I discussed with the patient the risks of infliximab including but not limited to myelosuppression, immunosuppression, autoimmune hepatitis, demyelinating diseases, lymphoma, and serious infections.  The patient understands that monitoring is required including a PPD at baseline and must alert us or the primary physician if symptoms of infection or other concerning signs are noted.
Cellcept Counseling:  I discussed with the patient the risks of mycophenolate mofetil including but not limited to infection/immunosuppression, GI upset, hypokalemia, hypercholesterolemia, bone marrow suppression, lymphoproliferative disorders, malignancy, GI ulceration/bleed/perforation, colitis, interstitial lung disease, kidney failure, progressive multifocal leukoencephalopathy, and birth defects.  The patient understands that monitoring is required including a baseline creatinine and regular CBC testing. In addition, patient must alert us immediately if symptoms of infection or other concerning signs are noted.
Griseofulvin Pregnancy And Lactation Text: This medication is Pregnancy Category X and is known to cause serious birth defects. It is unknown if this medication is excreted in breast milk but breast feeding should be avoided.
Bimzelx Counseling:  I discussed with the patient the risks of Bimzelx including but not limited to depression, immunosuppression, allergic reactions and infections.  The patient understands that monitoring is required including a PPD at baseline and must alert us or the primary physician if symptoms of infection or other concerning signs are noted.
Wegovy Counseling: I reviewed the possible side effects including: thyroid tumors, kidney disease, gallbladder disease, abdominal pain, constipation, diarrhea, nausea, vomiting and pancreatitis. Do not take this medication if you have a history or family history of multiple endocrine neoplasia syndrome type 2. Side effects reviewed, pt to contact office should one occur.
Topical Steroids Counseling: I discussed with the patient that prolonged use of topical steroids can result in the increased appearance of superficial blood vessels (telangiectasias), lightening (hypopigmentation) and thinning of the skin (atrophy).  Patient understands to avoid using high potency steroids in skin folds, the groin or the face.  The patient verbalized understanding of the proper use and possible adverse effects of topical steroids.  All of the patient's questions and concerns were addressed.
Rifampin Counseling: I discussed with the patient the risks of rifampin including but not limited to liver damage, kidney damage, red-orange body fluids, nausea/vomiting and severe allergy.
Oxybutynin Pregnancy And Lactation Text: This medication is Pregnancy Category B and is considered safe during pregnancy. It is unknown if it is excreted in breast milk.
Odomzo Counseling- I discussed with the patient the risks of Odomzo including but not limited to nausea, vomiting, diarrhea, constipation, weight loss, changes in the sense of taste, decreased appetite, muscle spasms, and hair loss.  The patient verbalized understanding of the proper use and possible adverse effects of Odomzo.  All of the patient's questions and concerns were addressed.
Isotretinoin Pregnancy And Lactation Text: This medication is Pregnancy Category X and is considered extremely dangerous during pregnancy. It is unknown if it is excreted in breast milk.
Rifampin Pregnancy And Lactation Text: This medication is Pregnancy Category C and it isn't know if it is safe during pregnancy. It is also excreted in breast milk and should not be used if you are breast feeding.
Tremfya Counseling: I discussed with the patient the risks of guselkumab including but not limited to immunosuppression, serious infections, worsening of inflammatory bowel disease and drug reactions.  The patient understands that monitoring is required including a PPD at baseline and must alert us or the primary physician if symptoms of infection or other concerning signs are noted.
Qbrexza Counseling:  I discussed with the patient the risks of Qbrexza including but not limited to headache, mydriasis, blurred vision, dry eyes, nasal dryness, dry mouth, dry throat, dry skin, urinary hesitation, and constipation.  Local skin reactions including erythema, burning, stinging, and itching can also occur.
Doxepin Counseling:  Patient advised that the medication is sedating and not to drive a car after taking this medication. Patient informed of potential adverse effects including but not limited to dry mouth, urinary retention, and blurry vision.  The patient verbalized understanding of the proper use and possible adverse effects of doxepin.  All of the patient's questions and concerns were addressed.
Clofazimine Counseling:  I discussed with the patient the risks of clofazimine including but not limited to skin and eye pigmentation, liver damage, nausea/vomiting, gastrointestinal bleeding and allergy.
Azithromycin Counseling:  I discussed with the patient the risks of azithromycin including but not limited to GI upset, allergic reaction, drug rash, diarrhea, and yeast infections.
Bimzelx Pregnancy And Lactation Text: This medication crosses the placenta and the safety is uncertain during pregnancy. It is unknown if this medication is present in breast milk.
Cibinqo Counseling: I discussed with the patient the risks of Cibinqo therapy including but not limited to common cold, nausea, headache, cold sores, increased blood CPK levels, dizziness, UTIs, fatigue, acne, and vomitting. Live vaccines should be avoided.  This medication has been linked to serious infections; higher rate of mortality; malignancy and lymphoproliferative disorders; major adverse cardiovascular events; thrombosis; thrombocytopenia and lymphopenia; lipid elevations; and retinal detachment.
Benzoyl Peroxide Pregnancy And Lactation Text: This medication is Pregnancy Category C. It is unknown if benzoyl peroxide is excreted in breast milk.
Cyclophosphamide Counseling:  I discussed with the patient the risks of cyclophosphamide including but not limited to hair loss, hormonal abnormalities, decreased fertility, abdominal pain, diarrhea, nausea and vomiting, bone marrow suppression and infection. The patient understands that monitoring is required while taking this medication.
Nemluvio Counseling: I discussed with the patient the risks of nemolizumab including but not limited to headache, gastrointestinal complaints, nasopharyngitis, musculoskeletal complaints, injection site reactions, and allergic reactions. The patient understands that monitoring is required and they must alert us or the primary physician if any side effects are noted.
Doxepin Pregnancy And Lactation Text: This medication is Pregnancy Category C and it isn't known if it is safe during pregnancy. It is also excreted in breast milk and breast feeding isn't recommended.
Azithromycin Pregnancy And Lactation Text: This medication is considered safe during pregnancy and is also secreted in breast milk.
Topical Steroids Applications Pregnancy And Lactation Text: Most topical steroids are considered safe to use during pregnancy and lactation.  Any topical steroid applied to the breast or nipple should be washed off before breastfeeding.
High Dose Vitamin A Counseling: Side effects reviewed, pt to contact office should one occur.
Itraconazole Counseling:  I discussed with the patient the risks of itraconazole including but not limited to liver damage, nausea/vomiting, neuropathy, and severe allergy.  The patient understands that this medication is best absorbed when taken with acidic beverages such as non-diet cola or ginger ale.  The patient understands that monitoring is required including baseline LFTs and repeat LFTs at intervals.  The patient understands that they are to contact us or the primary physician if concerning signs are noted.
Propranolol Counseling:  I discussed with the patient the risks of propranolol including but not limited to low heart rate, low blood pressure, low blood sugar, restlessness and increased cold sensitivity. They should call the office if they experience any of these side effects.
Qbrexza Pregnancy And Lactation Text: There is no available data on Qbrexza use in pregnant women.  There is no available data on Qbrexza use in lactation.
Cimzia Counseling:  I discussed with the patient the risks of Cimzia including but not limited to immunosuppression, allergic reactions and infections.  The patient understands that monitoring is required including a PPD at baseline and must alert us or the primary physician if symptoms of infection or other concerning signs are noted.
Sotyktu Counseling:  I discussed the most common side effects of Sotyktu including: common cold, sore throat, sinus infections, cold sores, canker sores, folliculitis, and acne.  I also discussed more serious side effects of Sotyktu including but not limited to: serious allergic reactions; increased risk for infections such as TB; cancers such as lymphomas; rhabdomyolysis and elevated CPK; and elevated triglycerides and liver enzymes. 
High Dose Vitamin A Pregnancy And Lactation Text: High dose vitamin A therapy is contraindicated during pregnancy and breast feeding.
Propranolol Pregnancy And Lactation Text: This medication is Pregnancy Category C and it isn't known if it is safe during pregnancy. It is excreted in breast milk.
Cibinqo Pregnancy And Lactation Text: It is unknown if this medication will adversely affect pregnancy or breast feeding.  You should not take this medication if you are currently pregnant or planning a pregnancy or while breastfeeding.
Carac Counseling:  I discussed with the patient the risks of Carac including but not limited to erythema, scaling, itching, weeping, crusting, and pain.
Topical Sulfur Applications Counseling: Topical Sulfur Counseling: Patient counseled that this medication may cause skin irritation or allergic reactions.  In the event of skin irritation, the patient was advised to reduce the amount of the drug applied or use it less frequently.   The patient verbalized understanding of the proper use and possible adverse effects of topical sulfur application.  All of the patient's questions and concerns were addressed.
Sarecycline Counseling: Patient advised regarding possible photosensitivity and discoloration of the teeth, skin, lips, tongue and gums.  Patient instructed to avoid sunlight, if possible.  When exposed to sunlight, patients should wear protective clothing, sunglasses, and sunscreen.  The patient was instructed to call the office immediately if the following severe adverse effects occur:  hearing changes, easy bruising/bleeding, severe headache, or vision changes.  The patient verbalized understanding of the proper use and possible adverse effects of sarecycline.  All of the patient's questions and concerns were addressed.
Spironolactone Pregnancy And Lactation Text: This medication can cause feminization of the male fetus and should be avoided during pregnancy. The active metabolite is also found in breast milk.
Tazorac Pregnancy And Lactation Text: This medication is not safe during pregnancy. It is unknown if this medication is excreted in breast milk.
Erythromycin Pregnancy And Lactation Text: This medication is Pregnancy Category B and is considered safe during pregnancy. It is also excreted in breast milk.
Hydroxychloroquine Pregnancy And Lactation Text: This medication has been shown to cause fetal harm but it isn't assigned a Pregnancy Risk Category. There are small amounts excreted in breast milk.
Olanzapine Counseling- I discussed with the patient the common side effects of olanzapine including but are not limited to: lack of energy, dry mouth, increased appetite, sleepiness, tremor, constipation, dizziness, changes in behavior, or restlessness.  Explained that teenagers are more likely to experience headaches, abdominal pain, pain in the arms or legs, tiredness, and sleepiness.  Serious side effects include but are not limited: increased risk of death in elderly patients who are confused, have memory loss, or dementia-related psychosis; hyperglycemia; increased cholesterol and triglycerides; and weight gain.
Humira Counseling:  I discussed with the patient the risks of adalimumab including but not limited to myelosuppression, immunosuppression, autoimmune hepatitis, demyelinating diseases, lymphoma, and serious infections.  The patient understands that monitoring is required including a PPD at baseline and must alert us or the primary physician if symptoms of infection or other concerning signs are noted.
Metronidazole Counseling:  I discussed with the patient the risks of metronidazole including but not limited to seizures, nausea/vomiting, a metallic taste in the mouth, nausea/vomiting and severe allergy.
Olanzapine Pregnancy And Lactation Text: This medication is pregnancy category C.   There are no adequate and well controlled trials with olanzapine in pregnant females.  Olanzapine should be used during pregnancy only if the potential benefit justifies the potential risk to the fetus.   In a study in lactating healthy women, olanzapine was excreted in breast milk.  It is recommended that women taking olanzapine should not breast feed.
Zyclara Counseling:  I discussed with the patient the risks of imiquimod including but not limited to erythema, scaling, itching, weeping, crusting, and pain.  Patient understands that the inflammatory response to imiquimod is variable from person to person and was educated regarded proper titration schedule.  If flu-like symptoms develop, patient knows to discontinue the medication and contact us.
Topical Clindamycin Counseling: Patient counseled that this medication may cause skin irritation or allergic reactions.  In the event of skin irritation, the patient was advised to reduce the amount of the drug applied or use it less frequently.   The patient verbalized understanding of the proper use and possible adverse effects of clindamycin.  All of the patient's questions and concerns were addressed.
Ozempic Counseling: I reviewed the possible side effects including: thyroid tumors, kidney disease, gallbladder disease, abdominal pain, constipation, diarrhea, nausea, vomiting and pancreatitis. Do not take this medication if you have a history or family history of multiple endocrine neoplasia syndrome type 2. Side effects reviewed, pt to contact office should one occur.
Eucrisa Counseling: Patient may experience a mild burning sensation during topical application. Eucrisa is not approved in children less than 2 years of age.
Opzelura Counseling:  I discussed with the patient the risks of Opzelura including but not limited to nasopharngitis, bronchitis, ear infection, eosinophila, hives, diarrhea, folliculitis, tonsillitis, and rhinorrhea.  Taken orally, this medication has been linked to serious infections; higher rate of mortality; malignancy and lymphoproliferative disorders; major adverse cardiovascular events; thrombosis; thrombocytopenia, anemia, and neutropenia; and lipid elevations.
Acitretin Counseling:  I discussed with the patient the risks of acitretin including but not limited to hair loss, dry lips/skin/eyes, liver damage, hyperlipidemia, depression/suicidal ideation, photosensitivity.  Serious rare side effects can include but are not limited to pancreatitis, pseudotumor cerebri, bony changes, clot formation/stroke/heart attack.  Patient understands that alcohol is contraindicated since it can result in liver toxicity and significantly prolong the elimination of the drug by many years.
Spevigo Counseling: I discussed with the patient the risks of Spevigo including but not limited to fatigue, nasuea, vomiting, headache, pruritus, urinary tract infection, an infusion related reactions.  The patient understands that monitoring is required including screening for tuberculosis at baseline and yearly screening thereafter while continuing Spevigo therapy. They should contact us if symptoms of infection or other concerning signs are noted.
Oral Minoxidil Counseling- I discussed with the patient the risks of oral minoxidil including but not limited to shortness of breath, swelling of the feet or ankles, dizziness, lightheadedness, unwanted hair growth and allergic reaction.  The patient verbalized understanding of the proper use and possible adverse effects of oral minoxidil.  All of the patient's questions and concerns were addressed.
Metronidazole Pregnancy And Lactation Text: This medication is Pregnancy Category B and considered safe during pregnancy.  It is also excreted in breast milk.
Albendazole Counseling:  I discussed with the patient the risks of albendazole including but not limited to cytopenia, kidney damage, nausea/vomiting and severe allergy.  The patient understands that this medication is being used in an off-label manner.
Aklief counseling:  Patient advised to apply a pea-sized amount only at bedtime and wait 30 minutes after washing their face before applying.  If too drying, patient may add a non-comedogenic moisturizer.  The most commonly reported side effects including irritation, redness, scaling, dryness, stinging, burning, itching, and increased risk of sunburn.  The patient verbalized understanding of the proper use and possible adverse effects of retinoids.  All of the patient's questions and concerns were addressed.
Opzelura Pregnancy And Lactation Text: There is insufficient data to evaluate drug-associated risk for major birth defects, miscarriage, or other adverse maternal or fetal outcomes.  There is a pregnancy registry that monitors pregnancy outcomes in pregnant persons exposed to the medication during pregnancy.  It is unknown if this medication is excreted in breast milk.  Do not breastfeed during treatment and for about 4 weeks after the last dose.
Hyrimoz Counseling:  I discussed with the patient the risks of adalimumab including but not limited to myelosuppression, immunosuppression, autoimmune hepatitis, demyelinating diseases, lymphoma, and serious infections.  The patient understands that monitoring is required including a PPD at baseline and must alert us or the primary physician if symptoms of infection or other concerning signs are noted.
Spevigo Pregnancy And Lactation Text: The risk during pregnancy and breastfeeding is uncertain with this medication. This medication does cross the placenta. It is unknown if this medication is found in breast milk.
Oral Minoxidil Pregnancy And Lactation Text: This medication should only be used when clearly needed if you are pregnant, attempting to become pregnant or breast feeding.
Topical Ketoconazole Counseling: Patient counseled that this medication may cause skin irritation or allergic reactions.  In the event of skin irritation, the patient was advised to reduce the amount of the drug applied or use it less frequently.   The patient verbalized understanding of the proper use and possible adverse effects of ketoconazole.  All of the patient's questions and concerns were addressed.
Saxenda Counseling: I reviewed the possible side effects including: thyroid tumors, kidney disease, gallbladder disease, abdominal pain, constipation, diarrhea, nausea, vomiting and pancreatitis. Do not take this medication if you have a history or family history of multiple endocrine neoplasia syndrome type 2. Side effects reviewed, pt to contact office should one occur.
Minocycline Counseling: Patient advised regarding possible photosensitivity and discoloration of the teeth, skin, lips, tongue and gums.  Patient instructed to avoid sunlight, if possible.  When exposed to sunlight, patients should wear protective clothing, sunglasses, and sunscreen.  The patient was instructed to call the office immediately if the following severe adverse effects occur:  hearing changes, easy bruising/bleeding, severe headache, or vision changes.  The patient verbalized understanding of the proper use and possible adverse effects of minocycline.  All of the patient's questions and concerns were addressed.
Erivedge Counseling- I discussed with the patient the risks of Erivedge including but not limited to nausea, vomiting, diarrhea, constipation, weight loss, changes in the sense of taste, decreased appetite, muscle spasms, and hair loss.  The patient verbalized understanding of the proper use and possible adverse effects of Erivedge.  All of the patient's questions and concerns were addressed.
Acitretin Pregnancy And Lactation Text: This medication is Pregnancy Category X and should not be given to women who are pregnant or may become pregnant in the future. This medication is excreted in breast milk.
Hydroquinone Counseling:  Patient advised that medication may result in skin irritation, lightening (hypopigmentation), dryness, and burning.  In the event of skin irritation, the patient was advised to reduce the amount of the drug applied or use it less frequently.  Rarely, spots that are treated with hydroquinone can become darker (pseudoochronosis).  Should this occur, patient instructed to stop medication and call the office. The patient verbalized understanding of the proper use and possible adverse effects of hydroquinone.  All of the patient's questions and concerns were addressed.
Picato Counseling:  I discussed with the patient the risks of Picato including but not limited to erythema, scaling, itching, weeping, crusting, and pain.
Cantharidin Pregnancy And Lactation Text: This medication has not been proven safe during pregnancy. It is unknown if this medication is excreted in breast milk.
Detail Level: Simple
Stelara Counseling:  I discussed with the patient the risks of ustekinumab including but not limited to immunosuppression, malignancy, posterior leukoencephalopathy syndrome, and serious infections.  The patient understands that monitoring is required including a PPD at baseline and must alert us or the primary physician if symptoms of infection or other concerning signs are noted.
Aklief Pregnancy And Lactation Text: It is unknown if this medication is safe to use during pregnancy.  It is unknown if this medication is excreted in breast milk.  Breastfeeding women should use the topical cream on the smallest area of the skin for the shortest time needed while breastfeeding.  Do not apply to nipple and areola.
Ilumya Counseling: I discussed with the patient the risks of tildrakizumab including but not limited to immunosuppression, malignancy, posterior leukoencephalopathy syndrome, and serious infections.  The patient understands that monitoring is required including a PPD at baseline and must alert us or the primary physician if symptoms of infection or other concerning signs are noted.
Include Pregnancy/Lactation Warning?: No
Fluconazole Counseling:  Patient counseled regarding adverse effects of fluconazole including but not limited to headache, diarrhea, nausea, upset stomach, liver function test abnormalities, taste disturbance, and stomach pain.  There is a rare possibility of liver failure that can occur when taking fluconazole.  The patient understands that monitoring of LFTs and kidney function test may be required, especially at baseline. The patient verbalized understanding of the proper use and possible adverse effects of fluconazole.  All of the patient's questions and concerns were addressed.
Otezla Counseling: The side effects of Otezla were discussed with the patient, including but not limited to worsening or new depression, weight loss, diarrhea, nausea, upper respiratory tract infection, and headache. Patient instructed to call the office should any adverse effect occur.  The patient verbalized understanding of the proper use and possible adverse effects of Otezla.  All the patient's questions and concerns were addressed.
Bexarotene Counseling:  I discussed with the patient the risks of bexarotene including but not limited to hair loss, dry lips/skin/eyes, liver abnormalities, hyperlipidemia, pancreatitis, depression/suicidal ideation, photosensitivity, drug rash/allergic reactions, hypothyroidism, anemia, leukopenia, infection, cataracts, and teratogenicity.  Patient understands that they will need regular blood tests to check lipid profile, liver function tests, white blood cell count, thyroid function tests and pregnancy test if applicable.
Klisyri Counseling:  I discussed with the patient the risks of Klisyri including but not limited to erythema, scaling, itching, weeping, crusting, and pain.
Tranexamic Acid Counseling:  Patient advised of the small risk of bleeding problems with tranexamic acid. They were also instructed to call if they developed any nausea, vomiting or diarrhea. All of the patient's questions and concerns were addressed.
Prednisone Counseling:  I discussed with the patient the risks of prolonged use of prednisone including but not limited to weight gain, insomnia, osteoporosis, mood changes, diabetes, susceptibility to infection, glaucoma and high blood pressure.  In cases where prednisone use is prolonged, patients should be monitored with blood pressure checks, serum glucose levels and an eye exam.  Additionally, the patient may need to be placed on GI prophylaxis, PCP prophylaxis, and calcium and vitamin D supplementation and/or a bisphosphonate.  The patient verbalized understanding of the proper use and the possible adverse effects of prednisone.  All of the patient's questions and concerns were addressed.
Dupixent Pregnancy And Lactation Text: This medication likely crosses the placenta but the risk for the fetus is uncertain. This medication is excreted in breast milk.
Finasteride Female Counseling: Finasteride Counseling:  I discussed with the patient the risks of use of finasteride including but not limited to decreased libido and sexual dysfunction. Explained the teratogenic nature of the medication and stressed the importance of not getting pregnant during treatment. All of the patient's questions and concerns were addressed.
Simlandi Counseling:  I discussed with the patient the risks of adalimumab including but not limited to myelosuppression, immunosuppression, autoimmune hepatitis, demyelinating diseases, lymphoma, and serious infections.  The patient understands that monitoring is required including a PPD at baseline and must alert us or the primary physician if symptoms of infection or other concerning signs are noted.
Winlevi Pregnancy And Lactation Text: This medication is considered safe during pregnancy and breastfeeding.
Soolantra Pregnancy And Lactation Text: This medication is Pregnancy Category C. This medication is considered safe during breast feeding.
Finasteride Pregnancy And Lactation Text: This medication is absolutely contraindicated during pregnancy. It is unknown if it is excreted in breast milk.
Clindamycin Pregnancy And Lactation Text: This medication can be used in pregnancy if certain situations. Clindamycin is also present in breast milk.
Niacinamide Counseling: I recommended taking niacin or niacinamide, also know as vitamin B3, twice daily. Recent evidence suggests that taking vitamin B3 (500 mg twice daily) can reduce the risk of actinic keratoses and non-melanoma skin cancers. Side effects of vitamin B3 include flushing and headache.
Ebglyss Counseling: I discussed with the patient the risks of lebrikizumab including but not limited to eye inflammation and irritation, cold sores, injection site reactions, allergic reactions and increased risk of parasitic infection. The patient understands that monitoring is required and they must alert us or the primary physician if symptoms of infection or other concerning signs are noted.
Rinvoq Pregnancy And Lactation Text: Based on animal studies, Rinvoq may cause embryo-fetal harm when administered to pregnant women.  The medication should not be used in pregnancy.  Breastfeeding is not recommended during treatment and for 6 days after the last dose.
Doxycycline Counseling:  Patient counseled regarding possible photosensitivity and increased risk for sunburn.  Patient instructed to avoid sunlight, if possible.  When exposed to sunlight, patients should wear protective clothing, sunglasses, and sunscreen.  The patient was instructed to call the office immediately if the following severe adverse effects occur:  hearing changes, easy bruising/bleeding, severe headache, or vision changes.  The patient verbalized understanding of the proper use and possible adverse effects of doxycycline.  All of the patient's questions and concerns were addressed.
Glycopyrrolate Counseling:  I discussed with the patient the risks of glycopyrrolate including but not limited to skin rash, drowsiness, dry mouth, difficulty urinating, and blurred vision.
Klisyri Pregnancy And Lactation Text: It is unknown if this medication can harm a developing fetus or if it is excreted in breast milk.
VTAMA Counseling: I discussed with the patient that VTAMA is not for use in the eyes, mouth or mouth. They should call the office if they develop any signs of allergic reactions to VTAMA. The patient verbalized understanding of the proper use and possible adverse effects of VTAMA.  All of the patient's questions and concerns were addressed.
Tranexamic Acid Pregnancy And Lactation Text: It is unknown if this medication is safe during pregnancy or breast feeding.
Ebglyss Pregnancy And Lactation Text: This medication likely crosses the placenta but the risk for the fetus is uncertain. It is unknown if this medication is excreted in breast milk.
Vtama Pregnancy And Lactation Text: It is unknown if this medication can cause problems during pregnancy and breastfeeding.
Valtrex Counseling: I discussed with the patient the risks of valacyclovir including but not limited to kidney damage, nausea, vomiting and severe allergy.  The patient understands that if the infection seems to be worsening or is not improving, they are to call.
Topical Retinoid counseling:  Patient advised to apply a pea-sized amount only at bedtime and wait 30 minutes after washing their face before applying.  If too drying, patient may add a non-comedogenic moisturizer. The patient verbalized understanding of the proper use and possible adverse effects of retinoids.  All of the patient's questions and concerns were addressed.
Birth Control Pills Counseling: Birth Control Pill Counseling: I discussed with the patient the potential side effects of OCPs including but not limited to increased risk of stroke, heart attack, thrombophlebitis, deep venous thrombosis, hepatic adenomas, breast changes, GI upset, headaches, and depression.  The patient verbalized understanding of the proper use and possible adverse effects of OCPs. All of the patient's questions and concerns were addressed.
Xeljanz Counseling: I discussed with the patient the risks of Xeljanz therapy including increased risk of infection, liver issues, headache, diarrhea, or cold symptoms. Live vaccines should be avoided. They were instructed to call if they have any problems.
Drysol Counseling:  I discussed with the patient the risks of drysol/aluminum chloride including but not limited to skin rash, itching, irritation, burning.
Niacinamide Pregnancy And Lactation Text: These medications are considered safe during pregnancy.
Simponi Counseling:  I discussed with the patient the risks of golimumab including but not limited to myelosuppression, immunosuppression, autoimmune hepatitis, demyelinating diseases, lymphoma, and serious infections.  The patient understands that monitoring is required including a PPD at baseline and must alert us or the primary physician if symptoms of infection or other concerning signs are noted.
Xelclaudiaz Pregnancy And Lactation Text: This medication is Pregnancy Category D and is not considered safe during pregnancy.  The risk during breast feeding is also uncertain.
Doxycycline Pregnancy And Lactation Text: This medication is Pregnancy Category D and not consider safe during pregnancy. It is also excreted in breast milk but is considered safe for shorter treatment courses.
Nsaids Counseling: NSAID Counseling: I discussed with the patient that NSAIDs should be taken with food. Prolonged use of NSAIDs can result in the development of stomach ulcers.  Patient advised to stop taking NSAIDs if abdominal pain occurs.  The patient verbalized understanding of the proper use and possible adverse effects of NSAIDs.  All of the patient's questions and concerns were addressed.
Minoxidil Counseling: Minoxidil is a topical medication which can increase blood flow where it is applied. It is uncertain how this medication increases hair growth. Side effects are uncommon and include stinging and allergic reactions.
Glycopyrrolate Pregnancy And Lactation Text: This medication is Pregnancy Category B and is considered safe during pregnancy. It is unknown if it is excreted breast milk.
Valtrex Pregnancy And Lactation Text: this medication is Pregnancy Category B and is considered safe during pregnancy. This medication is not directly found in breast milk but it's metabolite acyclovir is present.
Birth Control Pills Pregnancy And Lactation Text: This medication should be avoided if pregnant and for the first 30 days post-partum.
Enbrel Counseling:  I discussed with the patient the risks of etanercept including but not limited to myelosuppression, immunosuppression, autoimmune hepatitis, demyelinating diseases, lymphoma, and infections.  The patient understands that monitoring is required including a PPD at baseline and must alert us or the primary physician if symptoms of infection or other concerning signs are noted.
Zoryve Counseling:  I discussed with the patient that Zoryve is not for use in the eyes, mouth or vagina. The most commonly reported side effects include diarrhea, headache, insomnia, application site pain, upper respiratory tract infections, and urinary tract infections.  All of the patient's questions and concerns were addressed.
Nsaids Pregnancy And Lactation Text: These medications are considered safe up to 30 weeks gestation. It is excreted in breast milk.
Tazorac Counseling:  Patient advised that medication is irritating and drying.  Patient may need to apply sparingly and wash off after an hour before eventually leaving it on overnight.  The patient verbalized understanding of the proper use and possible adverse effects of tazorac.  All of the patient's questions and concerns were addressed.
Elidel Counseling: Patient may experience a mild burning sensation during topical application. Elidel is not approved in children less than 2 years of age. There have been case reports of hematologic and skin malignancies in patients using topical calcineurin inhibitors although causality is questionable.
Hydroxychloroquine Counseling:  I discussed with the patient that a baseline ophthalmologic exam is needed at the start of therapy and every year thereafter while on therapy. A CBC may also be warranted for monitoring.  The side effects of this medication were discussed with the patient, including but not limited to agranulocytosis, aplastic anemia, seizures, rashes, retinopathy, and liver toxicity. Patient instructed to call the office should any adverse effect occur.  The patient verbalized understanding of the proper use and possible adverse effects of Plaquenil.  All the patient's questions and concerns were addressed.
Erythromycin Counseling:  I discussed with the patient the risks of erythromycin including but not limited to GI upset, allergic reaction, drug rash, diarrhea, increase in liver enzymes, and yeast infections.
Mirvaso Counseling: Mirvaso is a topical medication which can decrease superficial blood flow where applied. Side effects are uncommon and include stinging, redness and allergic reactions.
Spironolactone Counseling: Patient advised regarding risks of diarrhea, abdominal pain, hyperkalemia, birth defects (for female patients), liver toxicity and renal toxicity. The patient may need blood work to monitor liver and kidney function and potassium levels while on therapy. The patient verbalized understanding of the proper use and possible adverse effects of spironolactone.  All of the patient's questions and concerns were addressed.
Skyrizi Counseling: I discussed with the patient the risks of risankizumab-rzaa including but not limited to immunosuppression, and serious infections.  The patient understands that monitoring is required including a PPD at baseline and must alert us or the primary physician if symptoms of infection or other concerning signs are noted.

## 2025-03-03 NOTE — PROCEDURE: PRESCRIPTION MEDICATION MANAGEMENT
Initiate Treatment: spironolactone 50 mg tablet Bid\\nQuantity: 90.0 Tablet  Days Supply: 30\\nSig: Take one tab po qd with plenty of water
Detail Level: Zone
Render In Strict Bullet Format?: No
Plan: Azelaic aid qam with moisturizer if needed , hydroquinone qhs x 6 weeks, then return to Tretinoin qohs
Continue Regimen: azelaic acid 15 % topical gel \\nQuantity: 50.0 g  Days Supply: 30\\nSig: Apply small amount to AA on the face once daily\\n\\ntretinoin 0.05 % topical cream \\nQuantity: 45.0 g  Days Supply: 30\\nSig: Apply pea sized amount (1/2 gram) to face qohs starting in 6 weeks after using hydroquinone

## 2025-03-03 NOTE — PROCEDURE: COUNSELING
Spironolactone Counseling: Patient advised regarding risks of diarrhea, abdominal pain, hyperkalemia, birth defects (for female patients), liver toxicity and renal toxicity. The patient may need blood work to monitor liver and kidney function and potassium levels while on therapy. The patient verbalized understanding of the proper use and possible adverse effects of spironolactone.  All of the patient's questions and concerns were addressed.
Benzoyl Peroxide Pregnancy And Lactation Text: This medication is Pregnancy Category C. It is unknown if benzoyl peroxide is excreted in breast milk.
Isotretinoin Pregnancy And Lactation Text: This medication is Pregnancy Category X and is considered extremely dangerous during pregnancy. It is unknown if it is excreted in breast milk.
Azelaic Acid Pregnancy And Lactation Text: This medication is considered safe during pregnancy and breast feeding.
Topical Sulfur Applications Counseling: Topical Sulfur Counseling: Patient counseled that this medication may cause skin irritation or allergic reactions.  In the event of skin irritation, the patient was advised to reduce the amount of the drug applied or use it less frequently.   The patient verbalized understanding of the proper use and possible adverse effects of topical sulfur application.  All of the patient's questions and concerns were addressed.
Birth Control Pills Counseling: Birth Control Pill Counseling: I discussed with the patient the potential side effects of OCPs including but not limited to increased risk of stroke, heart attack, thrombophlebitis, deep venous thrombosis, hepatic adenomas, breast changes, GI upset, headaches, and depression.  The patient verbalized understanding of the proper use and possible adverse effects of OCPs. All of the patient's questions and concerns were addressed.
Erythromycin Pregnancy And Lactation Text: This medication is Pregnancy Category B and is considered safe during pregnancy. It is also excreted in breast milk.
Sarecycline Counseling: Patient advised regarding possible photosensitivity and discoloration of the teeth, skin, lips, tongue and gums.  Patient instructed to avoid sunlight, if possible.  When exposed to sunlight, patients should wear protective clothing, sunglasses, and sunscreen.  The patient was instructed to call the office immediately if the following severe adverse effects occur:  hearing changes, easy bruising/bleeding, severe headache, or vision changes.  The patient verbalized understanding of the proper use and possible adverse effects of sarecycline.  All of the patient's questions and concerns were addressed.
Winlevi Counseling:  I discussed with the patient the risks of topical clascoterone including but not limited to erythema, scaling, itching, and stinging. Patient voiced their understanding.
High Dose Vitamin A Counseling: Side effects reviewed, pt to contact office should one occur.
Dapsone Pregnancy And Lactation Text: This medication is Pregnancy Category C and is not considered safe during pregnancy or breast feeding.
Isotretinoin Counseling: Patient should get monthly blood tests, not donate blood, not drive at night if vision affected, not share medication, and not undergo elective surgery for 6 months after tx completed. Side effects reviewed, pt to contact office should one occur.
Aklief Pregnancy And Lactation Text: It is unknown if this medication is safe to use during pregnancy.  It is unknown if this medication is excreted in breast milk.  Breastfeeding women should use the topical cream on the smallest area of the skin for the shortest time needed while breastfeeding.  Do not apply to nipple and areola.
Bactrim Counseling:  I discussed with the patient the risks of sulfa antibiotics including but not limited to GI upset, allergic reaction, drug rash, diarrhea, dizziness, photosensitivity, and yeast infections.  Rarely, more serious reactions can occur including but not limited to aplastic anemia, agranulocytosis, methemoglobinemia, blood dyscrasias, liver or kidney failure, lung infiltrates or desquamative/blistering drug rashes.
Doxycycline Pregnancy And Lactation Text: This medication is Pregnancy Category D and not consider safe during pregnancy. It is also excreted in breast milk but is considered safe for shorter treatment courses.
Tetracycline Pregnancy And Lactation Text: This medication is Pregnancy Category D and not consider safe during pregnancy. It is also excreted in breast milk.
Tazorac Counseling:  Patient advised that medication is irritating and drying.  Patient may need to apply sparingly and wash off after an hour before eventually leaving it on overnight.  The patient verbalized understanding of the proper use and possible adverse effects of tazorac.  All of the patient's questions and concerns were addressed.
Azithromycin Counseling:  I discussed with the patient the risks of azithromycin including but not limited to GI upset, allergic reaction, drug rash, diarrhea, and yeast infections.
Minocycline Counseling: Patient advised regarding possible photosensitivity and discoloration of the teeth, skin, lips, tongue and gums.  Patient instructed to avoid sunlight, if possible.  When exposed to sunlight, patients should wear protective clothing, sunglasses, and sunscreen.  The patient was instructed to call the office immediately if the following severe adverse effects occur:  hearing changes, easy bruising/bleeding, severe headache, or vision changes.  The patient verbalized understanding of the proper use and possible adverse effects of minocycline.  All of the patient's questions and concerns were addressed.
Topical Clindamycin Counseling: Patient counseled that this medication may cause skin irritation or allergic reactions.  In the event of skin irritation, the patient was advised to reduce the amount of the drug applied or use it less frequently.   The patient verbalized understanding of the proper use and possible adverse effects of clindamycin.  All of the patient's questions and concerns were addressed.
Benzoyl Peroxide Counseling: Patient counseled that medicine may cause skin irritation and bleach clothing.  In the event of skin irritation, the patient was advised to reduce the amount of the drug applied or use it less frequently.   The patient verbalized understanding of the proper use and possible adverse effects of benzoyl peroxide.  All of the patient's questions and concerns were addressed.
Use Enhanced Medication Counseling?: No
Bactrim Pregnancy And Lactation Text: This medication is Pregnancy Category D and is known to cause fetal risk.  It is also excreted in breast milk.
Erythromycin Counseling:  I discussed with the patient the risks of erythromycin including but not limited to GI upset, allergic reaction, drug rash, diarrhea, increase in liver enzymes, and yeast infections.
Winlevi Pregnancy And Lactation Text: This medication is considered safe during pregnancy and breastfeeding.
Topical Retinoid counseling:  Patient advised to apply a pea-sized amount only at bedtime and wait 30 minutes after washing their face before applying.  If too drying, patient may add a non-comedogenic moisturizer. The patient verbalized understanding of the proper use and possible adverse effects of retinoids.  All of the patient's questions and concerns were addressed.
Azithromycin Pregnancy And Lactation Text: This medication is considered safe during pregnancy and is also secreted in breast milk.
Spironolactone Pregnancy And Lactation Text: This medication can cause feminization of the male fetus and should be avoided during pregnancy. The active metabolite is also found in breast milk.
Birth Control Pills Pregnancy And Lactation Text: This medication should be avoided if pregnant and for the first 30 days post-partum.
Topical Sulfur Applications Pregnancy And Lactation Text: This medication is Pregnancy Category C and has an unknown safety profile during pregnancy. It is unknown if this topical medication is excreted in breast milk.
Detail Level: Detailed
Aklief counseling:  Patient advised to apply a pea-sized amount only at bedtime and wait 30 minutes after washing their face before applying.  If too drying, patient may add a non-comedogenic moisturizer.  The most commonly reported side effects including irritation, redness, scaling, dryness, stinging, burning, itching, and increased risk of sunburn.  The patient verbalized understanding of the proper use and possible adverse effects of retinoids.  All of the patient's questions and concerns were addressed.
Dapsone Counseling: I discussed with the patient the risks of dapsone including but not limited to hemolytic anemia, agranulocytosis, rashes, methemoglobinemia, kidney failure, peripheral neuropathy, headaches, GI upset, and liver toxicity.  Patients who start dapsone require monitoring including baseline LFTs and weekly CBCs for the first month, then every month thereafter.  The patient verbalized understanding of the proper use and possible adverse effects of dapsone.  All of the patient's questions and concerns were addressed.
Topical Clindamycin Pregnancy And Lactation Text: This medication is Pregnancy Category B and is considered safe during pregnancy. It is unknown if it is excreted in breast milk.
Azelaic Acid Counseling: Patient counseled that medicine may cause skin irritation and to avoid applying near the eyes.  In the event of skin irritation, the patient was advised to reduce the amount of the drug applied or use it less frequently.   The patient verbalized understanding of the proper use and possible adverse effects of azelaic acid.  All of the patient's questions and concerns were addressed.
Topical Retinoid Pregnancy And Lactation Text: This medication is Pregnancy Category C. It is unknown if this medication is excreted in breast milk.
Doxycycline Counseling:  Patient counseled regarding possible photosensitivity and increased risk for sunburn.  Patient instructed to avoid sunlight, if possible.  When exposed to sunlight, patients should wear protective clothing, sunglasses, and sunscreen.  The patient was instructed to call the office immediately if the following severe adverse effects occur:  hearing changes, easy bruising/bleeding, severe headache, or vision changes.  The patient verbalized understanding of the proper use and possible adverse effects of doxycycline.  All of the patient's questions and concerns were addressed.
High Dose Vitamin A Pregnancy And Lactation Text: High dose vitamin A therapy is contraindicated during pregnancy and breast feeding.
Tetracycline Counseling: Patient counseled regarding possible photosensitivity and increased risk for sunburn.  Patient instructed to avoid sunlight, if possible.  When exposed to sunlight, patients should wear protective clothing, sunglasses, and sunscreen.  The patient was instructed to call the office immediately if the following severe adverse effects occur:  hearing changes, easy bruising/bleeding, severe headache, or vision changes.  The patient verbalized understanding of the proper use and possible adverse effects of tetracycline.  All of the patient's questions and concerns were addressed. Patient understands to avoid pregnancy while on therapy due to potential birth defects.
Tazorac Pregnancy And Lactation Text: This medication is not safe during pregnancy. It is unknown if this medication is excreted in breast milk.

## (undated) DEVICE — STERILE HOOK LOCK LATEX FREE ELASTIC BANDAGE 6INX5YD: Brand: HOOK LOCK™

## (undated) DEVICE — Device

## (undated) DEVICE — INTENDED FOR TISSUE SEPARATION, AND OTHER PROCEDURES THAT REQUIRE A SHARP SURGICAL BLADE TO PUNCTURE OR CUT.: Brand: BARD-PARKER ® STAINLESS STEEL BLADES

## (undated) DEVICE — STOCKINETTE CAST W76XL2280CM POLY SYN FN KNIT RIB

## (undated) DEVICE — GLOVE SURG SZ 8 L12IN FNGR THK79MIL GRN LTX FREE

## (undated) DEVICE — PREP SKN CHLRAPRP APL 26ML STR --

## (undated) DEVICE — SET IRRIG DST FLX M CONN

## (undated) DEVICE — GOWN,SIRUS,NONRNF,SETINSLV,XL,20/CS: Brand: MEDLINE

## (undated) DEVICE — GLOVE SURG SZ 65 L12IN FNGR THK79MIL GRN LTX FREE

## (undated) DEVICE — BLADE SHV L13CM DIA4MM CVD DISECT AGG COOLCUT

## (undated) DEVICE — SUTURE VCRL SZ 2-0 L27IN ABSRB UD L26MM CT-2 1/2 CIR J269H

## (undated) DEVICE — SOFT SILICONE HYDROCELLULAR FOAM DRESSING WITH LOCK AWAY LAYER: Brand: ALLEVYN LIFE XL 21X21 CTN10

## (undated) DEVICE — FOOT DR TOLLISON & WOMACK: Brand: MEDLINE INDUSTRIES, INC.

## (undated) DEVICE — ZIMMER® STERILE DISPOSABLE TOURNIQUET CUFF WITH PLC, DUAL PORT, SINGLE BLADDER, 30 IN. (76 CM)

## (undated) DEVICE — GLOVE SURG SZ 65 CRM LTX FREE POLYISOPRENE POLYMER BEAD ANTI

## (undated) DEVICE — SYR LR LCK 1ML GRAD NSAF 30ML --

## (undated) DEVICE — WEREWOLF FLOW 90 COBLATION WAND: Brand: COBLATION

## (undated) DEVICE — NDL SPNE QNCKE 18GX3.5IN LF --

## (undated) DEVICE — KNEE ARTHROSCOPY DR KOCH: Brand: MEDLINE INDUSTRIES, INC.

## (undated) DEVICE — SET IRRIG W 96IN TBNG 4 LN FLX BG

## (undated) DEVICE — SUTURE FIBERWIRE SZ 2 W/ TAPERED NEEDLE BLUE L38IN NONABSORB BLU L26.5MM 1/2 CIRCLE AR7200

## (undated) DEVICE — SINGLE PORT MANIFOLD: Brand: NEPTUNE 2

## (undated) DEVICE — DRAPE,U/SHT,SPLIT,FILM,60X84,STERILE: Brand: MEDLINE

## (undated) DEVICE — SUTURE MCRYL SZ 3-0 L27IN ABSRB UD L19MM PS-2 3/8 CIR PRIM Y427H

## (undated) DEVICE — T-DRAPE,EXTREMITY,STERILE: Brand: MEDLINE

## (undated) DEVICE — STOCKINETTE,IMPERVIOUS,12X48,STERILE: Brand: MEDLINE

## (undated) DEVICE — STRIP,CLOSURE,WOUND,MEDI-STRIP,1/2X4: Brand: MEDLINE

## (undated) DEVICE — SPLINT THMB W4XL30IN FBRGLS PD PRECUT LTWT DURABLE FAST SET

## (undated) DEVICE — SOLUTION IRRIG 3000ML 0.9% SOD CHL FLX CONT 0797208] ICU MEDICAL INC]

## (undated) DEVICE — BANDAGE COBAN 6 IN WND 6INX5YD FOAM

## (undated) DEVICE — MASTISOL ADHESIVE LIQ 2/3ML